# Patient Record
Sex: FEMALE | Race: WHITE | NOT HISPANIC OR LATINO | Employment: OTHER | URBAN - METROPOLITAN AREA
[De-identification: names, ages, dates, MRNs, and addresses within clinical notes are randomized per-mention and may not be internally consistent; named-entity substitution may affect disease eponyms.]

---

## 2018-06-11 ENCOUNTER — TELEPHONE (OUTPATIENT)
Dept: PODIATRY | Facility: CLINIC | Age: 83
End: 2018-06-11

## 2018-06-11 NOTE — TELEPHONE ENCOUNTER
----- Message from Ernesto Sims sent at 6/11/2018  9:16 AM CDT -----  Contact: same  Patient called in and wants to be seen before the next available of 6/18/18 to get her toenails trimmed.  Patient call back number is [phone]

## 2018-06-13 ENCOUNTER — OFFICE VISIT (OUTPATIENT)
Dept: PODIATRY | Facility: CLINIC | Age: 83
End: 2018-06-13
Payer: MEDICARE

## 2018-06-13 VITALS
DIASTOLIC BLOOD PRESSURE: 59 MMHG | WEIGHT: 175 LBS | SYSTOLIC BLOOD PRESSURE: 139 MMHG | BODY MASS INDEX: 31.01 KG/M2 | HEART RATE: 69 BPM | TEMPERATURE: 98 F | HEIGHT: 63 IN

## 2018-06-13 DIAGNOSIS — E11.49 TYPE II DIABETES MELLITUS WITH NEUROLOGICAL MANIFESTATIONS: Primary | ICD-10-CM

## 2018-06-13 DIAGNOSIS — M19.071 OSTEOARTHRITIS OF RIGHT ANKLE AND FOOT: ICD-10-CM

## 2018-06-13 DIAGNOSIS — E11.42 DIABETIC POLYNEUROPATHY ASSOCIATED WITH TYPE 2 DIABETES MELLITUS: ICD-10-CM

## 2018-06-13 PROCEDURE — 99213 OFFICE O/P EST LOW 20 MIN: CPT | Mod: PBBFAC | Performed by: PODIATRIST

## 2018-06-13 PROCEDURE — 99213 OFFICE O/P EST LOW 20 MIN: CPT | Mod: S$PBB,,, | Performed by: PODIATRIST

## 2018-06-13 PROCEDURE — 99999 PR PBB SHADOW E&M-EST. PATIENT-LVL III: CPT | Mod: PBBFAC,,, | Performed by: PODIATRIST

## 2018-06-16 NOTE — PROGRESS NOTES
Subjective:       Patient ID: Cinthya Cuevas is a 83 y.o. female.    Chief Complaint: Follow-up; Nail Problem; and Callouses  The patient returns for followup she has a history of severe pes planus contracted digits diabetes type 2 and degenerative arthritis. Patient states she has been wearing her newly dispensed orthotics which have been working great and completely relieving her foot pain she states it is also reduced the calluses that have presented on the right foot     HPI  Review of Systems   Musculoskeletal: Positive for arthralgias, gait problem and joint swelling.   Neurological: Positive for numbness.   All other systems reviewed and are negative.      Objective:      Physical Exam   Constitutional: She appears well-developed and well-nourished.   Cardiovascular:   Pulses:       Dorsalis pedis pulses are 1+ on the right side, and 1+ on the left side.        Posterior tibial pulses are 0 on the right side, and 0 on the left side.   Musculoskeletal: She exhibits edema, tenderness and deformity.        Right foot: There is deformity.        Left foot: There is deformity.   Feet:   Right Foot:   Protective Sensation: 4 sites tested. 1 site sensed.  Skin Integrity: Positive for callus and dry skin.   Left Foot:   Protective Sensation: 4 sites tested. 1 site sensed.  Skin Integrity: Positive for callus and dry skin.   Neurological: She displays abnormal reflex.   Skin: Capillary refill takes more than 3 seconds.   Psychiatric: She has a normal mood and affect. Her behavior is normal. Judgment and thought content normal.   Nursing note and vitals reviewed.    Patient has findings consistent with diabetic related neuropathy bilateral. No skin breaks or active signs of infection noted however the patient does have mild hyperkeratotic tissue with a pre-ulcerative area on the distal aspect of the second digit right foot as well as along the medial aspect of the patient's first digit right foot both of these  areas are painful upon direct palpation currently did not display signs of infection but are at risk for additional breakdown.patient is noted to have considerable hyperkeratotic tissue buildup this is on the plantar aspect of the first MPJ right this area is very sore and tender to palpation it is altered in nature debridement of the area reveals healthy pink tissue there is significant relief noted following debridement no active signs of infection currently noted. the area of most significantly noted discomfort is distal second digit right secondary to digital contracture.I cannot exclude the possibility of a bone spur on the tip of the second digit right because of the severity of discomfort although debridement of the nail seems to relieve some of the patient's pain. Patient does display severe medial column collapse and pronation on the right foot in comparison to the left which is likely contributing to stress put on the patient's right knee with associated inflammation.     Assessment:       1. Type II diabetes mellitus with neurological manifestations    2. Osteoarthritis of right ankle and foot    3. Diabetic polyneuropathy associated with type 2 diabetes mellitus        Plan:       Following evaluation patient's orthotics are noted to be fitting the patient very well their supporting the patient very well they have completely relieved the pain the patient was having in both feet additionally the areas of pre-ulcerative hyperkeratotic tissue underlying the first MPJ and medial right hallux are significantly reduced no significant pain is noted in these areas. The small amount of hyperkeratotic tissue that is present was debrided today I did examine the patient orthotics they are noted to be fitting the patient well patient had several ingrown toenails are also debrided today followup will be as needed diabetic evaluation provided.patient did indicate today that some days she is not able to wear her arch  supports 3-4 hours because they just make her feet feel tired she states they take she takes them off and a tired feeling is immediately relieved I did advise the patient as long as she is wearing them the majority of the time especially with increased activity that the most important time that these be worn. I applied a temporary lateral posting with adhesive felt to the posterior and mid foot area of the patient's orthotics I want to see how the patient responds to this if it makes her feet much more comfortable certainly these changes can be made permanently depending upon how the patient responds to that at followup.Patient had several ingrowing toenails especially noted on the distal medial left in the lateral right these are at risk for infection following debridement patient had considerable relief.   Patient was very emotional today her  passed away approximately 1 week ago.

## 2018-08-27 ENCOUNTER — OFFICE VISIT (OUTPATIENT)
Dept: PODIATRY | Facility: CLINIC | Age: 83
End: 2018-08-27
Payer: MEDICARE

## 2018-08-27 VITALS
RESPIRATION RATE: 18 BRPM | SYSTOLIC BLOOD PRESSURE: 124 MMHG | DIASTOLIC BLOOD PRESSURE: 61 MMHG | HEART RATE: 86 BPM | TEMPERATURE: 97 F

## 2018-08-27 DIAGNOSIS — E11.49 TYPE II DIABETES MELLITUS WITH NEUROLOGICAL MANIFESTATIONS: Primary | ICD-10-CM

## 2018-08-27 DIAGNOSIS — L60.0 INGROWN NAIL: ICD-10-CM

## 2018-08-27 PROCEDURE — 99213 OFFICE O/P EST LOW 20 MIN: CPT | Mod: S$PBB,,, | Performed by: PODIATRIST

## 2018-08-27 PROCEDURE — 99999 PR PBB SHADOW E&M-EST. PATIENT-LVL III: CPT | Mod: PBBFAC,,, | Performed by: PODIATRIST

## 2018-08-27 PROCEDURE — 99213 OFFICE O/P EST LOW 20 MIN: CPT | Mod: PBBFAC | Performed by: PODIATRIST

## 2018-09-01 PROBLEM — L60.0 INGROWN NAIL: Status: ACTIVE | Noted: 2018-09-01

## 2018-09-01 PROBLEM — E11.49 TYPE II DIABETES MELLITUS WITH NEUROLOGICAL MANIFESTATIONS: Status: ACTIVE | Noted: 2018-09-01

## 2018-09-01 NOTE — PROGRESS NOTES
Subjective:       Patient ID: Cinthya Cuevas is a 83 y.o. female.    Chief Complaint: Diabetic Foot Exam  The patient returns for followup she has a history of severe pes planus contracted digits diabetes type 2 and degenerative arthritis. Patient states she has been wearing her newly dispensed orthotics which have been working great and completely relieving her foot pain she states it is also reduced the calluses that have presented on the right foot     HPI  Review of Systems   Musculoskeletal: Positive for arthralgias, gait problem and joint swelling.   Neurological: Positive for numbness.   All other systems reviewed and are negative.      Objective:      Physical Exam   Constitutional: She appears well-developed and well-nourished.   Cardiovascular:   Pulses:       Dorsalis pedis pulses are 1+ on the right side, and 1+ on the left side.        Posterior tibial pulses are 0 on the right side, and 0 on the left side.   Musculoskeletal: She exhibits edema, tenderness and deformity.        Right foot: There is deformity.        Left foot: There is deformity.   Feet:   Right Foot:   Protective Sensation: 4 sites tested. 1 site sensed.  Skin Integrity: Positive for callus and dry skin.   Left Foot:   Protective Sensation: 4 sites tested. 1 site sensed.  Skin Integrity: Positive for callus and dry skin.   Neurological: She displays abnormal reflex.   Skin: Capillary refill takes more than 3 seconds.   Psychiatric: She has a normal mood and affect. Her behavior is normal. Judgment and thought content normal.   Nursing note and vitals reviewed.    Patient has findings consistent with diabetic related neuropathy bilateral. No skin breaks or active signs of infection noted however the patient does have mild hyperkeratotic tissue with a pre-ulcerative area on the distal aspect of the second digit right foot as well as along the medial aspect of the patient's first digit right foot both of these areas are painful upon  direct palpation currently did not display signs of infection but are at risk for additional breakdown.patient is noted to have considerable hyperkeratotic tissue buildup this is on the plantar aspect of the first MPJ right this area is very sore and tender to palpation it is altered in nature debridement of the area reveals healthy pink tissue there is significant relief noted following debridement no active signs of infection currently noted. the area of most significantly noted discomfort is distal second digit right secondary to digital contracture.I cannot exclude the possibility of a bone spur on the tip of the second digit right because of the severity of discomfort although debridement of the nail seems to relieve some of the patient's pain. Patient does display severe medial column collapse and pronation on the right foot in comparison to the left which is likely contributing to stress put on the patient's right knee with associated inflammation.     Assessment:       1. Type II diabetes mellitus with neurological manifestations    2. Ingrown nail        Plan:       Following evaluation patient's orthotics are noted to be fitting the patient very well their supporting the patient very well they have completely relieved the pain the patient was having in both feet additionally the areas of pre-ulcerative hyperkeratotic tissue underlying the first MPJ and medial right hallux are significantly reduced no significant pain is noted in these areas. The small amount of hyperkeratotic tissue that is present was debrided today I did examine the patient orthotics they are noted to be fitting the patient well patient had several ingrown toenails are also debrided today followup will be as needed diabetic evaluation provided.patient did indicate today that some days she is not able to wear her arch supports 3-4 hours because they just make her feet feel tired she states they take she takes them off and a tired feeling  is immediately relieved I did advise the patient as long as she is wearing them the majority of the time especially with increased activity that the most important time that these be worn. I applied a temporary lateral posting with adhesive felt to the posterior and mid foot area of the patient's orthotics I want to see how the patient responds to this if it makes her feet much more comfortable certainly these changes can be made permanently depending upon how the patient responds to that at followup.Patient had several ingrowing toenails especially noted on the distal medial left in the lateral right these are at risk for infection following debridement patient had considerable relief.   Patient was very emotional today her  passed away.

## 2018-11-05 ENCOUNTER — OFFICE VISIT (OUTPATIENT)
Dept: PODIATRY | Facility: CLINIC | Age: 83
End: 2018-11-05
Payer: MEDICARE

## 2018-11-05 VITALS — BODY MASS INDEX: 32.02 KG/M2 | WEIGHT: 174 LBS | TEMPERATURE: 97 F | HEIGHT: 62 IN

## 2018-11-05 DIAGNOSIS — L60.0 INGROWN NAIL: ICD-10-CM

## 2018-11-05 DIAGNOSIS — E11.49 TYPE II DIABETES MELLITUS WITH NEUROLOGICAL MANIFESTATIONS: Primary | ICD-10-CM

## 2018-11-05 DIAGNOSIS — L97.511 SKIN ULCER OF RIGHT FOOT, LIMITED TO BREAKDOWN OF SKIN: ICD-10-CM

## 2018-11-05 PROCEDURE — 99999 PR PBB SHADOW E&M-EST. PATIENT-LVL II: CPT | Mod: PBBFAC,,, | Performed by: PODIATRIST

## 2018-11-05 PROCEDURE — 99213 OFFICE O/P EST LOW 20 MIN: CPT | Mod: S$PBB,,, | Performed by: PODIATRIST

## 2018-11-05 PROCEDURE — 99212 OFFICE O/P EST SF 10 MIN: CPT | Mod: PBBFAC | Performed by: PODIATRIST

## 2018-11-05 RX ORDER — SIMVASTATIN 40 MG/1
TABLET, FILM COATED ORAL
COMMUNITY
Start: 2018-08-12 | End: 2021-08-17

## 2018-11-05 RX ORDER — AMITRIPTYLINE HYDROCHLORIDE 25 MG/1
TABLET, FILM COATED ORAL
COMMUNITY
Start: 2018-09-27 | End: 2021-03-23 | Stop reason: SDUPTHER

## 2018-11-10 PROBLEM — L97.511 SKIN ULCER OF RIGHT FOOT, LIMITED TO BREAKDOWN OF SKIN: Status: ACTIVE | Noted: 2018-11-10

## 2018-11-10 NOTE — PROGRESS NOTES
Subjective:       Patient ID: Cinthya Cuevas is a 83 y.o. female.    Chief Complaint: Follow-up; Nail Problem; and Callouses  The patient returns for followup she has a history of severe pes planus contracted digits diabetes type 2 and degenerative arthritis. Patient states she has been wearing her newly dispensed orthotics which have been working great and completely relieving her foot pain she states it is also reduced the calluses that have presented on the right foot     Nail Problem   Associated symptoms include arthralgias, joint swelling and numbness.     Review of Systems   Musculoskeletal: Positive for arthralgias, gait problem and joint swelling.   Neurological: Positive for numbness.   All other systems reviewed and are negative.      Objective:      Physical Exam   Constitutional: She appears well-developed and well-nourished.   Cardiovascular:   Pulses:       Dorsalis pedis pulses are 1+ on the right side, and 1+ on the left side.        Posterior tibial pulses are 0 on the right side, and 0 on the left side.   Musculoskeletal: She exhibits edema, tenderness and deformity.        Right foot: There is deformity.        Left foot: There is deformity.   Feet:   Right Foot:   Protective Sensation: 4 sites tested. 1 site sensed.  Skin Integrity: Positive for callus and dry skin.   Left Foot:   Protective Sensation: 4 sites tested. 1 site sensed.  Skin Integrity: Positive for callus and dry skin.   Neurological: She displays abnormal reflex.   Skin: Capillary refill takes more than 3 seconds.   Psychiatric: She has a normal mood and affect. Her behavior is normal. Judgment and thought content normal.   Nursing note and vitals reviewed.    Patient has findings consistent with diabetic related neuropathy bilateral. No skin breaks or active signs of infection noted however the patient does have mild hyperkeratotic tissue with a pre-ulcerative area on the distal aspect of the second digit right foot as well  as along the medial aspect of the patient's first digit right foot both of these areas are painful upon direct palpation currently did not display signs of infection but are at risk for additional breakdown.patient is noted to have considerable hyperkeratotic tissue buildup this is on the plantar aspect of the first MPJ right this area is very sore and tender to palpation it is altered in nature debridement of the area reveals healthy pink tissue there is significant relief noted following debridement no active signs of infection currently noted. the area of most significantly noted discomfort is distal second digit right secondary to digital contracture.I cannot exclude the possibility of a bone spur on the tip of the second digit right because of the severity of discomfort although debridement of the nail seems to relieve some of the patient's pain. Patient does display severe medial column collapse and pronation on the right foot in comparison to the left which is likely contributing to stress put on the patient's right knee with associated inflammation.     Assessment:       1. Type II diabetes mellitus with neurological manifestations    2. Ingrown nail    3. Skin ulcer of right foot, limited to breakdown of skin        Plan:       Following evaluation patient's orthotics are noted to be fitting the patient very well their supporting the patient very well they have completely relieved the pain the patient was having in both feet additionally the areas of pre-ulcerative hyperkeratotic tissue underlying the first MPJ and medial right hallux are significantly reduced no significant pain is noted in these areas. The small amount of hyperkeratotic tissue that is present was debrided today I did examine the patient orthotics they are noted to be fitting the patient well patient had several ingrown toenails are also debrided today followup will be as needed diabetic evaluation provided.patient did indicate today that  some days she is not able to wear her arch supports 3-4 hours because they just make her feet feel tired she states they take she takes them off and a tired feeling is immediately relieved I did advise the patient as long as she is wearing them the majority of the time especially with increased activity that the most important time that these be worn.Patient had several ingrowing toenails especially noted on the distal medial left in the lateral right these are at risk for infection following debridement patient had considerable relief.    Patient relates she fell earlier today and was not able to get up she had the fire department help her get up and regain her balance she had multiple bruises on her lower extremities an arm.

## 2019-02-18 ENCOUNTER — OFFICE VISIT (OUTPATIENT)
Dept: PODIATRY | Facility: CLINIC | Age: 84
End: 2019-02-18
Payer: MEDICARE

## 2019-02-18 VITALS
SYSTOLIC BLOOD PRESSURE: 172 MMHG | WEIGHT: 174 LBS | BODY MASS INDEX: 32.02 KG/M2 | DIASTOLIC BLOOD PRESSURE: 62 MMHG | HEIGHT: 62 IN | HEART RATE: 64 BPM

## 2019-02-18 DIAGNOSIS — E11.49 TYPE II DIABETES MELLITUS WITH NEUROLOGICAL MANIFESTATIONS: ICD-10-CM

## 2019-02-18 DIAGNOSIS — L97.511 SKIN ULCER OF RIGHT FOOT, LIMITED TO BREAKDOWN OF SKIN: Primary | ICD-10-CM

## 2019-02-18 DIAGNOSIS — L60.0 INGROWN NAIL: ICD-10-CM

## 2019-02-18 PROCEDURE — 99213 OFFICE O/P EST LOW 20 MIN: CPT | Mod: S$PBB,,, | Performed by: PODIATRIST

## 2019-02-18 PROCEDURE — 99999 PR PBB SHADOW E&M-EST. PATIENT-LVL III: ICD-10-PCS | Mod: PBBFAC,,, | Performed by: PODIATRIST

## 2019-02-18 PROCEDURE — 99999 PR PBB SHADOW E&M-EST. PATIENT-LVL III: CPT | Mod: PBBFAC,,, | Performed by: PODIATRIST

## 2019-02-18 PROCEDURE — 99213 OFFICE O/P EST LOW 20 MIN: CPT | Mod: PBBFAC | Performed by: PODIATRIST

## 2019-02-18 PROCEDURE — 99213 PR OFFICE/OUTPT VISIT, EST, LEVL III, 20-29 MIN: ICD-10-PCS | Mod: S$PBB,,, | Performed by: PODIATRIST

## 2019-02-18 RX ORDER — METFORMIN HYDROCHLORIDE 1000 MG/1
TABLET ORAL
COMMUNITY
Start: 2019-02-15 | End: 2020-07-15

## 2019-02-23 NOTE — PROGRESS NOTES
Subjective:       Patient ID: Cinthya Cuevas is a 84 y.o. female.    Chief Complaint: Follow-up; Diabetes Mellitus; Nail Problem; and Foot Problem  The patient returns for followup she has a history of severe pes planus contracted digits diabetes type 2 and degenerative arthritis.   Nail Problem   Associated symptoms include arthralgias, joint swelling and numbness.     Review of Systems   Musculoskeletal: Positive for arthralgias, gait problem and joint swelling.   Neurological: Positive for numbness.   All other systems reviewed and are negative.      Objective:      Physical Exam   Constitutional: She appears well-developed and well-nourished.   Cardiovascular:   Pulses:       Dorsalis pedis pulses are 1+ on the right side, and 1+ on the left side.        Posterior tibial pulses are 0 on the right side, and 0 on the left side.   Musculoskeletal: She exhibits edema, tenderness and deformity.        Right foot: There is deformity.        Left foot: There is deformity.   Feet:   Right Foot:   Protective Sensation: 4 sites tested. 1 site sensed.  Skin Integrity: Positive for callus and dry skin.   Left Foot:   Protective Sensation: 4 sites tested. 1 site sensed.  Skin Integrity: Positive for callus and dry skin.   Neurological: She displays abnormal reflex.   Skin: Capillary refill takes more than 3 seconds.   Psychiatric: She has a normal mood and affect. Her behavior is normal. Judgment and thought content normal.   Nursing note and vitals reviewed.    Patient has findings consistent with diabetic related neuropathy bilateral. No skin breaks or active signs of infection noted however the patient does have mild hyperkeratotic tissue with a pre-ulcerative area on the distal aspect of the second digit right foot as well as along the medial aspect of the patient's first digit right foot both of these areas are painful upon direct palpation currently did not display signs of infection but are at risk for additional  breakdown.patient is noted to have considerable hyperkeratotic tissue buildup this is on the plantar aspect of the first MPJ right this area is very sore and tender to palpation it is altered in nature debridement of the area reveals healthy pink tissue there is significant relief noted following debridement no active signs of infection currently noted. the area of most significantly noted discomfort is distal second digit right secondary to digital contracture.I cannot exclude the possibility of a bone spur on the tip of the second digit right because of the severity of discomfort although debridement of the nail seems to relieve some of the patient's pain. Patient does display severe medial column collapse and pronation on the right foot in comparison to the left which is likely contributing to stress put on the patient's right knee with associated inflammation.     Assessment:       1. Skin ulcer of right foot, limited to breakdown of skin    2. Ingrown nail    3. Type II diabetes mellitus with neurological manifestations        Plan:       Following evaluation patient's orthotics are noted to be fitting the patient very well their supporting the patient very well they have completely relieved the pain the patient was having in both feet additionally the areas of pre-ulcerative hyperkeratotic tissue underlying the first MPJ and medial right hallux are significantly reduced no significant pain is noted in these areas. The small amount of hyperkeratotic tissue that is present was debrided today I did examine the patient orthotics they are noted to be fitting the patient well patient had several ingrown toenails are also debrided today followup will be as needed diabetic evaluation provided.patient did indicate today that some days she is not able to wear her arch supports 3-4 hours because they just make her feet feel tired she states they take she takes them off and a tired feeling is immediately relieved I did  advise the patient as long as she is wearing them the majority of the time especially with increased activity that the most important time that these be worn.Patient had several ingrowing toenails especially noted on the distal medial left in the lateral right these are at risk for infection following debridement patient had considerable relief.       This note was created using [employer] voice recognition software that occasionally misinterpreted phrases or words.

## 2019-03-06 ENCOUNTER — OFFICE VISIT (OUTPATIENT)
Dept: PODIATRY | Facility: CLINIC | Age: 84
End: 2019-03-06
Payer: MEDICARE

## 2019-03-06 ENCOUNTER — TELEPHONE (OUTPATIENT)
Dept: PODIATRY | Facility: CLINIC | Age: 84
End: 2019-03-06

## 2019-03-06 ENCOUNTER — LAB VISIT (OUTPATIENT)
Dept: LAB | Facility: HOSPITAL | Age: 84
End: 2019-03-06
Attending: PODIATRIST
Payer: MEDICARE

## 2019-03-06 VITALS
DIASTOLIC BLOOD PRESSURE: 69 MMHG | SYSTOLIC BLOOD PRESSURE: 141 MMHG | TEMPERATURE: 98 F | HEIGHT: 62 IN | BODY MASS INDEX: 32.02 KG/M2 | HEART RATE: 61 BPM | WEIGHT: 174 LBS

## 2019-03-06 DIAGNOSIS — M10.9 GOUT OF RIGHT FOOT, UNSPECIFIED CAUSE, UNSPECIFIED CHRONICITY: Primary | ICD-10-CM

## 2019-03-06 DIAGNOSIS — M10.9 GOUT OF RIGHT FOOT, UNSPECIFIED CAUSE, UNSPECIFIED CHRONICITY: ICD-10-CM

## 2019-03-06 DIAGNOSIS — L03.119 CELLULITIS AND ABSCESS OF FOOT: ICD-10-CM

## 2019-03-06 DIAGNOSIS — L02.619 CELLULITIS AND ABSCESS OF FOOT: ICD-10-CM

## 2019-03-06 LAB
BASOPHILS # BLD AUTO: 0.03 K/UL
BASOPHILS NFR BLD: 0.4 %
DIFFERENTIAL METHOD: ABNORMAL
EOSINOPHIL # BLD AUTO: 0.1 K/UL
EOSINOPHIL NFR BLD: 1.6 %
ERYTHROCYTE [DISTWIDTH] IN BLOOD BY AUTOMATED COUNT: 14.3 %
HCT VFR BLD AUTO: 37.5 %
HGB BLD-MCNC: 11.8 G/DL
IMM GRANULOCYTES # BLD AUTO: 0.08 K/UL
IMM GRANULOCYTES NFR BLD AUTO: 1 %
LYMPHOCYTES # BLD AUTO: 1.4 K/UL
LYMPHOCYTES NFR BLD: 17.9 %
MCH RBC QN AUTO: 28.2 PG
MCHC RBC AUTO-ENTMCNC: 31.5 G/DL
MCV RBC AUTO: 90 FL
MONOCYTES # BLD AUTO: 0.5 K/UL
MONOCYTES NFR BLD: 6.7 %
NEUTROPHILS # BLD AUTO: 5.7 K/UL
NEUTROPHILS NFR BLD: 72.4 %
NRBC BLD-RTO: 0 /100 WBC
PLATELET # BLD AUTO: 176 K/UL
PMV BLD AUTO: 10 FL
RBC # BLD AUTO: 4.18 M/UL
URATE SERPL-MCNC: 6.8 MG/DL
WBC # BLD AUTO: 7.88 K/UL

## 2019-03-06 PROCEDURE — 99213 OFFICE O/P EST LOW 20 MIN: CPT | Mod: PBBFAC | Performed by: PODIATRIST

## 2019-03-06 PROCEDURE — 84550 ASSAY OF BLOOD/URIC ACID: CPT

## 2019-03-06 PROCEDURE — 99214 PR OFFICE/OUTPT VISIT, EST, LEVL IV, 30-39 MIN: ICD-10-PCS | Mod: S$PBB,,, | Performed by: PODIATRIST

## 2019-03-06 PROCEDURE — 85025 COMPLETE CBC W/AUTO DIFF WBC: CPT

## 2019-03-06 PROCEDURE — 99999 PR PBB SHADOW E&M-EST. PATIENT-LVL III: ICD-10-PCS | Mod: PBBFAC,,, | Performed by: PODIATRIST

## 2019-03-06 PROCEDURE — 96372 THER/PROPH/DIAG INJ SC/IM: CPT | Mod: PBBFAC,RT | Performed by: PODIATRIST

## 2019-03-06 PROCEDURE — 99999 PR PBB SHADOW E&M-EST. PATIENT-LVL III: CPT | Mod: PBBFAC,,, | Performed by: PODIATRIST

## 2019-03-06 PROCEDURE — 99214 OFFICE O/P EST MOD 30 MIN: CPT | Mod: S$PBB,,, | Performed by: PODIATRIST

## 2019-03-06 PROCEDURE — 36415 COLL VENOUS BLD VENIPUNCTURE: CPT

## 2019-03-06 RX ORDER — COLCHICINE 0.6 MG/1
0.6 TABLET ORAL DAILY
Qty: 10 TABLET | Refills: 1 | Status: SHIPPED | OUTPATIENT
Start: 2019-03-06 | End: 2019-03-18

## 2019-03-06 RX ORDER — INDOMETHACIN 50 MG/1
50 CAPSULE ORAL 2 TIMES DAILY WITH MEALS
Qty: 42 CAPSULE | Refills: 2 | Status: SHIPPED | OUTPATIENT
Start: 2019-03-06 | End: 2019-03-18 | Stop reason: SDUPTHER

## 2019-03-06 RX ORDER — BETAMETHASONE SODIUM PHOSPHATE AND BETAMETHASONE ACETATE 3; 3 MG/ML; MG/ML
12 INJECTION, SUSPENSION INTRA-ARTICULAR; INTRALESIONAL; INTRAMUSCULAR; SOFT TISSUE
Status: COMPLETED | OUTPATIENT
Start: 2019-03-06 | End: 2019-03-06

## 2019-03-06 RX ADMIN — BETAMETHASONE ACETATE AND BETAMETHASONE SODIUM PHOSPHATE 12 MG: 3; 3 INJECTION, SUSPENSION INTRA-ARTICULAR; INTRALESIONAL; INTRAMUSCULAR; SOFT TISSUE at 03:03

## 2019-03-06 NOTE — TELEPHONE ENCOUNTER
Pt states she has been having a gout flare up for two days. Pt is requesting a rx to be sent to pharmacy. Please advise

## 2019-03-06 NOTE — TELEPHONE ENCOUNTER
----- Message from Kathleen Dejesus sent at 3/6/2019  8:35 AM CST -----  Contact: Patient  Type: Needs Medical Advice    Who Called:  Patient  Symptoms (please be specific):  Really bad gout  How long has patient had these symptoms:  2 days  Pharmacy name and phone #:    [id number] - [place], [place] - [address]  [address]  [place] [address]  Phone: [phone] Fax: [phone]  Best Call Back Number: [phone]  Additional Information: Patient would like to speak with someone at the office

## 2019-03-06 NOTE — TELEPHONE ENCOUNTER
Pt states she has not had a gout flare up. She thinks this is what it is since she is having the same s/s that her  did when he would have a flare up. Does she need to come in?

## 2019-03-06 NOTE — TELEPHONE ENCOUNTER
----- Message from Theo Urias DPM sent at 3/6/2019  4:15 PM CST -----  Please call the patient regarding her abnormal result.Advise Uric acid is elevated 5.7 is high she is 6.8

## 2019-03-10 NOTE — PROGRESS NOTES
Subjective:       Patient ID: Cinthya Cuevas is a 84 y.o. female.    Chief Complaint: Follow-up; Foot Pain; Foot Problem; and Diabetes Mellitus   Patient presents today stating that she is having a gout attack in her right foot she relates she has never had gout before but her late  had gout and she knows what it looks like.  Patient relates no injury or trauma to her right foot.    Nail Problem   Associated symptoms include arthralgias, joint swelling and numbness.   Foot Pain   Associated symptoms include arthralgias, joint swelling and numbness.     Review of Systems   Musculoskeletal: Positive for arthralgias, gait problem and joint swelling.   Neurological: Positive for numbness.   All other systems reviewed and are negative.      Objective:      Physical Exam   Constitutional: She appears well-developed and well-nourished.   Cardiovascular:   Pulses:       Dorsalis pedis pulses are 1+ on the right side, and 1+ on the left side.        Posterior tibial pulses are 0 on the right side, and 0 on the left side.   Musculoskeletal: She exhibits edema, tenderness and deformity.        Right foot: There is deformity.        Left foot: There is deformity.   Feet:   Right Foot:   Protective Sensation: 4 sites tested. 1 site sensed.  Skin Integrity: Positive for callus and dry skin.   Left Foot:   Protective Sensation: 4 sites tested. 1 site sensed.  Skin Integrity: Positive for callus and dry skin.   Neurological: She displays abnormal reflex.   Skin: Capillary refill takes more than 3 seconds.   Psychiatric: She has a normal mood and affect. Her behavior is normal. Judgment and thought content normal.   Nursing note and vitals reviewed.            Uric acid   Order: [id number]   Status:  Final result   Visible to patient:  No (Not Released) Next appt:  03/18/2019 at 11:45 AM in Podiatry (Theo Urias DPM) Dx:  Cellulitis and abscess of foot - Righ...    Ref Range & Units 3d ago   Uric Acid 2.4 - 5.7  mg/dL 6.8 Abnormally high     Resulting Agency  [facility]         Specimen Collected: 03/06/19 15:45 Last Resulted: 03/06/19 16:07 Lab Flowsheet Order Details View Encounter Lab and Collection Details Routing Result History               Assessment:       1. Gout of right foot, unspecified cause, unspecified chronicity    2. Cellulitis and abscess of foot - Right Foot        Plan:       Patient presents today for an unscheduled visit she contacted our office stating that she was having a gout attack in her right foot patient is a diabetic she has no history of ever having had gout but her late  had frequent gout attack so she states she knows what it looks like and she knows the amount of pain that he was in she states that this started yesterday morning it has gotten progressively worse the patient was not even able to leave the house yesterday the pain was so severe patient states she was not able to put a shoe on yesterday because of the swelling. Patient has considerable cellulitis and in skin crease skin temperature overlying the dorsal aspect of the right foot most specifically the 1st MPJ right there are no skin breaks no signs of drainage noted.  Subsequent uric acid is significantly elevated at 6.8.  Patient has Lasix listed on her medication however she states she has not taken Lasix and a long period of time she cannot relate any injury or trauma to her right foot and no significant change in activity or diet.  Patient was administered a Celestone injection today IM right side she tolerated this well I have started the patient on indomethacin and colchicine I plan to see the patient for follow-up in 10 days certainly if this becomes worse or more painful patient is to contact us immediately I have recommended ice and elevation the patient needs to stay off of her foot as much as possible.  Total face-to-face time including discussion evaluation treatment and discussion of gout and providing  education regarding gout equaled 30 min.      This note was created using [employer] voice recognition software that occasionally misinterpreted phrases or words.

## 2019-03-18 ENCOUNTER — OFFICE VISIT (OUTPATIENT)
Dept: PODIATRY | Facility: CLINIC | Age: 84
End: 2019-03-18
Payer: MEDICARE

## 2019-03-18 VITALS
TEMPERATURE: 98 F | BODY MASS INDEX: 32.02 KG/M2 | WEIGHT: 174 LBS | SYSTOLIC BLOOD PRESSURE: 123 MMHG | DIASTOLIC BLOOD PRESSURE: 59 MMHG | HEIGHT: 62 IN | HEART RATE: 77 BPM

## 2019-03-18 DIAGNOSIS — E11.49 TYPE II DIABETES MELLITUS WITH NEUROLOGICAL MANIFESTATIONS: ICD-10-CM

## 2019-03-18 DIAGNOSIS — M10.9 GOUT OF RIGHT FOOT, UNSPECIFIED CAUSE, UNSPECIFIED CHRONICITY: Primary | ICD-10-CM

## 2019-03-18 PROCEDURE — 99213 OFFICE O/P EST LOW 20 MIN: CPT | Mod: PBBFAC | Performed by: PODIATRIST

## 2019-03-18 PROCEDURE — 99999 PR PBB SHADOW E&M-EST. PATIENT-LVL III: ICD-10-PCS | Mod: PBBFAC,,, | Performed by: PODIATRIST

## 2019-03-18 PROCEDURE — 99213 PR OFFICE/OUTPT VISIT, EST, LEVL III, 20-29 MIN: ICD-10-PCS | Mod: S$PBB,,, | Performed by: PODIATRIST

## 2019-03-18 PROCEDURE — 99213 OFFICE O/P EST LOW 20 MIN: CPT | Mod: S$PBB,,, | Performed by: PODIATRIST

## 2019-03-18 PROCEDURE — 99999 PR PBB SHADOW E&M-EST. PATIENT-LVL III: CPT | Mod: PBBFAC,,, | Performed by: PODIATRIST

## 2019-03-18 RX ORDER — INDOMETHACIN 50 MG/1
50 CAPSULE ORAL 2 TIMES DAILY WITH MEALS
Qty: 42 CAPSULE | Refills: 2 | Status: SHIPPED | OUTPATIENT
Start: 2019-03-18 | End: 2019-04-01

## 2019-03-18 RX ORDER — COLCHICINE 0.6 MG/1
0.6 TABLET ORAL DAILY
Qty: 10 TABLET | Refills: 1 | Status: SHIPPED | OUTPATIENT
Start: 2019-03-18 | End: 2021-08-17

## 2019-03-23 NOTE — PROGRESS NOTES
Subjective:       Patient ID: Cinthya Cuevas is a 84 y.o. female.    Chief Complaint: Follow-up; Foot Pain; Foot Problem; and Diabetes Mellitus   patient presents for follow-up of gout attack right foot. She states she is doing dramatically better however she is now concerned about her left foot where she dropped something hitting the top of her foot causing a lot of bruising bruising.  Patient has finished taking her indomethacin and her colchicine.    Foot Pain   Associated symptoms include arthralgias, joint swelling and numbness.   Nail Problem   Associated symptoms include arthralgias, joint swelling and numbness.     Review of Systems   Musculoskeletal: Positive for arthralgias, gait problem and joint swelling.   Neurological: Positive for numbness.   All other systems reviewed and are negative.      Objective:      Physical Exam   Constitutional: She appears well-developed and well-nourished.   Cardiovascular:   Pulses:       Dorsalis pedis pulses are 1+ on the right side, and 1+ on the left side.        Posterior tibial pulses are 0 on the right side, and 0 on the left side.   Musculoskeletal: She exhibits edema, tenderness and deformity.        Right foot: There is deformity.        Left foot: There is deformity.   Feet:   Right Foot:   Protective Sensation: 4 sites tested. 1 site sensed.  Skin Integrity: Positive for callus and dry skin.   Left Foot:   Protective Sensation: 4 sites tested. 1 site sensed.  Skin Integrity: Positive for callus and dry skin.   Neurological: She displays abnormal reflex.   Skin: Capillary refill takes more than 3 seconds.   Psychiatric: She has a normal mood and affect. Her behavior is normal. Judgment and thought content normal.   Nursing note and vitals reviewed.            Uric acid   Order: [id number]   Status:  Final result   Visible to patient:  No (Not Released) Next appt:  03/18/2019 at 11:45 AM in Podiatry (Theo Urais DPM) Dx:  Cellulitis and abscess of foot  - Erikah...    Ref Range & Units 3d ago   Uric Acid 2.4 - 5.7 mg/dL 6.8 Abnormally high     Resulting Agency  [facility]         Specimen Collected: 03/06/19 15:45 Last Resulted: 03/06/19 16:07 Lab Flowsheet Order Details View Encounter Lab and Collection Details Routing Result History               Assessment:       1. Gout of right foot, unspecified cause, unspecified chronicity    2. Type II diabetes mellitus with neurological manifestations        Plan:       Following evaluation patient has significant reduction in previously noted increased skin temperature edema and erythema of the patient's right foot she has no tenderness or discomfort upon palpation examination of the patient's right foot where she had a previous gout attack patient's uric acid was 6.8.  Patient has taken both the indomethacin and colchicine as directed I have advised her I am going to give her some to keep on a shelf in her bathroom or medicine cabinet just in case she should have another attack.  Patient was in agreement with this at this point I am not going to put the patient on allopurinol this is the 1st attack she has ever had she may never have another attack I have recommended that we monitor the situation but certainly should the she thinks she start to have a gout attack again that she needs to contact me immediately.  Patient stated that within a couple hours of being seen in the office starting the medicine and having the steroid shot she was already doing better.  Follow-up will be as needed.  Total face-to-face time equaled 15 min.    This note was created using [employer] voice recognition software that occasionally misinterpreted phrases or words.

## 2019-06-17 ENCOUNTER — OFFICE VISIT (OUTPATIENT)
Dept: PODIATRY | Facility: CLINIC | Age: 84
End: 2019-06-17
Payer: MEDICARE

## 2019-06-17 ENCOUNTER — HOSPITAL ENCOUNTER (OUTPATIENT)
Dept: RADIOLOGY | Facility: HOSPITAL | Age: 84
Discharge: HOME | End: 2019-06-17
Attending: PODIATRIST
Payer: MEDICARE

## 2019-06-17 VITALS — BODY MASS INDEX: 32.02 KG/M2 | HEIGHT: 62 IN | HEART RATE: 77 BPM | WEIGHT: 174 LBS | TEMPERATURE: 99 F
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
## 2019-06-17 DIAGNOSIS — E11.49 TYPE II DIABETES MELLITUS WITH NEUROLOGICAL MANIFESTATIONS: Primary | ICD-10-CM

## 2019-06-17 DIAGNOSIS — S99.921A FOOT INJURY, RIGHT, INITIAL ENCOUNTER: ICD-10-CM

## 2019-06-17 DIAGNOSIS — L97.511 SKIN ULCER OF RIGHT FOOT, LIMITED TO BREAKDOWN OF SKIN: ICD-10-CM

## 2019-06-17 DIAGNOSIS — L60.0 INGROWN NAIL: ICD-10-CM

## 2019-06-17 PROCEDURE — 99214 PR OFFICE/OUTPT VISIT, EST, LEVL IV, 30-39 MIN: ICD-10-PCS | Mod: S$PBB,,, | Performed by: PODIATRIST

## 2019-06-17 PROCEDURE — 99999 PR PBB SHADOW E&M-EST. PATIENT-LVL III: ICD-10-PCS | Mod: PBBFAC,,, | Performed by: PODIATRIST

## 2019-06-17 PROCEDURE — 99214 OFFICE O/P EST MOD 30 MIN: CPT | Mod: S$PBB,,, | Performed by: PODIATRIST

## 2019-06-17 PROCEDURE — 73630 X-RAY EXAM OF FOOT: CPT | Mod: 26,RT,, | Performed by: RADIOLOGY

## 2019-06-17 PROCEDURE — 99999 PR PBB SHADOW E&M-EST. PATIENT-LVL III: CPT | Mod: PBBFAC,,, | Performed by: PODIATRIST

## 2019-06-17 PROCEDURE — 73630 X-RAY EXAM OF FOOT: CPT | Mod: TC,FY,RT

## 2019-06-17 PROCEDURE — 99213 OFFICE O/P EST LOW 20 MIN: CPT | Mod: PBBFAC,25 | Performed by: PODIATRIST

## 2019-06-17 PROCEDURE — 73630 XR FOOT COMPLETE 3 VIEW RIGHT: ICD-10-PCS | Mod: 26,RT,, | Performed by: RADIOLOGY

## 2019-06-17 RX ORDER — TRAZODONE HYDROCHLORIDE 50 MG/1
TABLET ORAL
COMMUNITY
Start: 2019-05-10 | End: 2021-08-17

## 2019-06-17 RX ORDER — ATORVASTATIN CALCIUM 40 MG/1
20 TABLET, FILM COATED ORAL NIGHTLY
COMMUNITY
Start: 2019-05-10 | End: 2021-08-17

## 2019-06-17 RX ORDER — POTASSIUM CHLORIDE 750 MG/1
TABLET, FILM COATED, EXTENDED RELEASE ORAL
COMMUNITY
Start: 2019-05-10 | End: 2021-07-12 | Stop reason: SDUPTHER
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
## 2019-06-22 NOTE — PROGRESS NOTES
Subjective:       Patient ID: Cinthya Cuevas is a 84 y.o. female.    Chief Complaint: Foot Injury; Follow-up; Nail Problem; and Diabetes Mellitus   patient presents today for diabetic evaluation she also recently states that she fell out of her bed 1 morning about a week ago and today's the 1st day she has been able to put her shoe on her right foot.    Foot Pain   Associated symptoms include arthralgias, joint swelling and numbness.   Nail Problem   Associated symptoms include arthralgias, joint swelling and numbness.   Foot Injury    Associated symptoms include numbness.     Review of Systems   Musculoskeletal: Positive for arthralgias, gait problem and joint swelling.   Neurological: Positive for numbness.   All other systems reviewed and are negative.      Objective:      Physical Exam   Constitutional: She appears well-developed and well-nourished.   Cardiovascular:   Pulses:       Dorsalis pedis pulses are 1+ on the right side, and 1+ on the left side.        Posterior tibial pulses are 0 on the right side, and 0 on the left side.   Musculoskeletal: She exhibits edema, tenderness and deformity.        Right foot: There is deformity.        Left foot: There is deformity.   Feet:   Right Foot:   Protective Sensation: 4 sites tested. 1 site sensed.  Skin Integrity: Positive for callus and dry skin.   Left Foot:   Protective Sensation: 4 sites tested. 1 site sensed.  Skin Integrity: Positive for callus and dry skin.   Neurological: She displays abnormal reflex.   Skin: Capillary refill takes more than 3 seconds.   Psychiatric: She has a normal mood and affect. Her behavior is normal. Judgment and thought content normal.   Nursing note and vitals reviewed.                    Assessment:       1. Type II diabetes mellitus with neurological manifestations    2. Skin ulcer of right foot, limited to breakdown of skin    3. Ingrown nail    4. Foot injury, right, initial encounter    5. Fall against object         Plan:       Patient presents today for complete diabetic evaluation in addition to this patient relates that about a week ago she fell out of her bed early in the morning she states she thinks she fell back asleep and just went had 1st out of the bed however she has significant bruising especially overlying her right elbow her foot right side displays significant ecchymosis edema it is very tender I have advised the patient I am concerned about the possibility of fracture and did do an x-ray today subsequent x-rays revealed a fracture at the base of the proximal phalanx right hallux as well as a fracture of the 2nd digit both of these areas are non or minimally displaced and should heal without complication patient needs to make sure she is wearing a loose-fitting comfortable shoe at all times obviously I have recommended ice and elevation if this should get worse in any way she is going to contact me immediately for further evaluation and treatment I discussed putting her in a postop shoe however I am concerned that that is going to affect her balance which could lead to an additional fall she is using a walker today.  Complete diabetic evaluation performed several areas of pre ulcerative hyperkeratotic lesions were debrided today x-rays reviewed I have advised the patient if this foot is not doing better over the next several weeks to contact me for follow-up further evaluation treatment I do not feel a postoperative shoe or a fracture boot would be appropriate for this patient with her problems with balance.  Total face-to-face time including discussion evaluation diabetic evaluation and treatment as well as follow-up for an injury of the right foot equaled 25 min.    This note was created using [employer] voice recognition software that occasionally misinterpreted phrases or words.

## 2019-09-25 ENCOUNTER — OFFICE VISIT (OUTPATIENT)
Dept: PODIATRY | Facility: CLINIC | Age: 84
End: 2019-09-25
Payer: MEDICARE

## 2019-09-25 VITALS
WEIGHT: 167 LBS | HEART RATE: 59 BPM | SYSTOLIC BLOOD PRESSURE: 123 MMHG | BODY MASS INDEX: 30.54 KG/M2 | DIASTOLIC BLOOD PRESSURE: 59 MMHG
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
## 2019-09-25 DIAGNOSIS — L97.511 SKIN ULCER OF RIGHT FOOT, LIMITED TO BREAKDOWN OF SKIN: ICD-10-CM

## 2019-09-25 DIAGNOSIS — L60.0 INGROWN NAIL: ICD-10-CM

## 2019-09-25 DIAGNOSIS — E11.49 TYPE II DIABETES MELLITUS WITH NEUROLOGICAL MANIFESTATIONS: Primary | ICD-10-CM

## 2019-09-25 PROCEDURE — 99999 PR PBB SHADOW E&M-EST. PATIENT-LVL II: CPT | Mod: PBBFAC,,, | Performed by: PODIATRIST

## 2019-09-25 PROCEDURE — 99999 PR PBB SHADOW E&M-EST. PATIENT-LVL II: ICD-10-PCS | Mod: PBBFAC,,, | Performed by: PODIATRIST

## 2019-09-25 PROCEDURE — 99213 PR OFFICE/OUTPT VISIT, EST, LEVL III, 20-29 MIN: ICD-10-PCS | Mod: S$PBB,,, | Performed by: PODIATRIST

## 2019-09-25 PROCEDURE — 99213 OFFICE O/P EST LOW 20 MIN: CPT | Mod: S$PBB,,, | Performed by: PODIATRIST

## 2019-09-25 PROCEDURE — 99212 OFFICE O/P EST SF 10 MIN: CPT | Mod: PBBFAC | Performed by: PODIATRIST

## 2019-09-29 NOTE — PROGRESS NOTES
Subjective:       Patient ID: Cinthya Cuevas is a 84 y.o. female.    Chief Complaint: Follow-up; Diabetes Mellitus; and Nail Problem   patient presents today for diabetic evaluation she also recently states that she fell out of her bed 1 morning about a week ago and today's the 1st day she has been able to put her shoe on her right foot.    Foot Injury    Associated symptoms include numbness.   Nail Problem   Associated symptoms include arthralgias, joint swelling and numbness.   Foot Pain   Associated symptoms include arthralgias, joint swelling and numbness.   Follow-up   Associated symptoms include arthralgias, joint swelling and numbness.     Review of Systems   Musculoskeletal: Positive for arthralgias, gait problem and joint swelling.   Neurological: Positive for numbness.   All other systems reviewed and are negative.      Objective:      Physical Exam   Constitutional: She appears well-developed and well-nourished.   Cardiovascular:   Pulses:       Dorsalis pedis pulses are 1+ on the right side, and 1+ on the left side.        Posterior tibial pulses are 0 on the right side, and 0 on the left side.   Musculoskeletal: She exhibits edema, tenderness and deformity.        Right foot: There is deformity.        Left foot: There is deformity.   Feet:   Right Foot:   Protective Sensation: 4 sites tested. 1 site sensed.  Skin Integrity: Positive for callus and dry skin.   Left Foot:   Protective Sensation: 4 sites tested. 1 site sensed.  Skin Integrity: Positive for callus and dry skin.   Neurological: She displays abnormal reflex.   Skin: Capillary refill takes more than 3 seconds.   Psychiatric: She has a normal mood and affect. Her behavior is normal. Judgment and thought content normal.   Nursing note and vitals reviewed.                    Assessment:       1. Type II diabetes mellitus with neurological manifestations    2. Ingrown nail    3. Skin ulcer of right foot, limited to breakdown of skin    4.  Fall against object        Plan:       Patient presents today for follow-up diabetic evaluation complete.  Patient has chronically ingrown toenails on both feet she has had to have these treated in trimmed for years she also has pre ulcerative hyperkeratotic lesions primarily at the distal aspect of the 2nd digit right secondary to contracture and sub 1st MPJ right these get a crease equally painful uncomfortable these have been infected before.  Patient continues to have severe balance problem she states she is awaiting the results of an MRI on her shoulder and she has been referred to physical therapy to help with strengthening.  Patient is currently using a walker all the time she states she still having problems with falling even while using a walker.  Following a complete diabetic evaluation the hyperkeratotic lesions were non excisionally debrided the ingrown toenails were debrided I plan to follow up with the patient in 6 weeks.  Patient was advised to contact us with any problems questions or concerns prior to her scheduled appointment.    This note was created using [employer] voice recognition software that occasionally misinterpreted phrases or words.

## 2019-11-27 ENCOUNTER — OFFICE VISIT (OUTPATIENT)
Dept: PODIATRY | Facility: CLINIC | Age: 84
End: 2019-11-27
Payer: MEDICARE

## 2019-11-27 VITALS
WEIGHT: 167 LBS | BODY MASS INDEX: 30.73 KG/M2 | TEMPERATURE: 98 F | RESPIRATION RATE: 19 BRPM | HEIGHT: 62 IN | OXYGEN SATURATION: 98 % | DIASTOLIC BLOOD PRESSURE: 71 MMHG | SYSTOLIC BLOOD PRESSURE: 127 MMHG | HEART RATE: 94 BPM

## 2019-11-27 DIAGNOSIS — L97.511 SKIN ULCER OF RIGHT FOOT, LIMITED TO BREAKDOWN OF SKIN: ICD-10-CM

## 2019-11-27 DIAGNOSIS — R26.81 UNSTEADY GAIT: ICD-10-CM

## 2019-11-27 DIAGNOSIS — E11.49 TYPE II DIABETES MELLITUS WITH NEUROLOGICAL MANIFESTATIONS: Primary | ICD-10-CM

## 2019-11-27 PROCEDURE — 99999 PR PBB SHADOW E&M-EST. PATIENT-LVL III: ICD-10-PCS | Mod: PBBFAC,,, | Performed by: PODIATRIST

## 2019-11-27 PROCEDURE — 99213 PR OFFICE/OUTPT VISIT, EST, LEVL III, 20-29 MIN: ICD-10-PCS | Mod: S$PBB,,, | Performed by: PODIATRIST

## 2019-11-27 PROCEDURE — 1126F PR PAIN SEVERITY QUANTIFIED, NO PAIN PRESENT: ICD-10-PCS | Mod: ,,, | Performed by: PODIATRIST

## 2019-11-27 PROCEDURE — 99999 PR PBB SHADOW E&M-EST. PATIENT-LVL III: CPT | Mod: PBBFAC,,, | Performed by: PODIATRIST

## 2019-11-27 PROCEDURE — 99213 OFFICE O/P EST LOW 20 MIN: CPT | Mod: PBBFAC | Performed by: PODIATRIST

## 2019-11-27 PROCEDURE — 99213 OFFICE O/P EST LOW 20 MIN: CPT | Mod: S$PBB,,, | Performed by: PODIATRIST

## 2019-11-27 PROCEDURE — 1159F MED LIST DOCD IN RCRD: CPT | Mod: ,,, | Performed by: PODIATRIST

## 2019-11-27 PROCEDURE — 1126F AMNT PAIN NOTED NONE PRSNT: CPT | Mod: ,,, | Performed by: PODIATRIST

## 2019-11-27 PROCEDURE — 1159F PR MEDICATION LIST DOCUMENTED IN MEDICAL RECORD: ICD-10-PCS | Mod: ,,, | Performed by: PODIATRIST

## 2019-12-01 PROBLEM — R26.81 UNSTEADY GAIT: Status: ACTIVE | Noted: 2019-12-01

## 2019-12-01 NOTE — PROGRESS NOTES
Subjective:       Patient ID: Cinthya Cuevas is a 84 y.o. female.    Chief Complaint: Diabetic Foot Exam   patient presents today for diabetic evaluation she also recently states that she fell out of her bed 1 morning about a week ago and today's the 1st day she has been able to put her shoe on her right foot.    Follow-up   Associated symptoms include arthralgias, joint swelling and numbness.   Nail Problem   Associated symptoms include arthralgias, joint swelling and numbness.   Foot Injury    Associated symptoms include numbness.   Foot Pain   Associated symptoms include arthralgias, joint swelling and numbness.     Review of Systems   Musculoskeletal: Positive for arthralgias, gait problem and joint swelling.   Neurological: Positive for numbness.   All other systems reviewed and are negative.      Objective:      Physical Exam   Constitutional: She appears well-developed and well-nourished.   Cardiovascular:   Pulses:       Dorsalis pedis pulses are 1+ on the right side, and 1+ on the left side.        Posterior tibial pulses are 0 on the right side, and 0 on the left side.   Musculoskeletal: She exhibits edema, tenderness and deformity.        Right foot: There is deformity.        Left foot: There is deformity.   Feet:   Right Foot:   Protective Sensation: 4 sites tested. 1 site sensed.  Skin Integrity: Positive for callus and dry skin.   Left Foot:   Protective Sensation: 4 sites tested. 1 site sensed.  Skin Integrity: Positive for callus and dry skin.   Neurological: She displays abnormal reflex.   Skin: Capillary refill takes more than 3 seconds.   Psychiatric: She has a normal mood and affect. Her behavior is normal. Judgment and thought content normal.   Nursing note and vitals reviewed.                    Assessment:       1. Type II diabetes mellitus with neurological manifestations    2. Skin ulcer of right foot, limited to breakdown of skin    3. Unsteady gait        Plan:       Patient presents  today for follow-up diabetic evaluation complete.  Patient has chronically ingrown toenails on both feet she has had to have these treated in trimmed for years she also has pre ulcerative hyperkeratotic lesions primarily at the distal aspect of the 2nd digit right secondary to contracture and sub 1st MPJ right these get a crease equally painful uncomfortable these have been infected before.  Patient continues to have severe balance problem she states she is awaiting the results of an MRI on her shoulder and she has been referred to physical therapy to help with strengthening.  Patient is currently using a walker all the time she states she still having problems with falling even while using a walker.  Following a complete diabetic evaluation the hyperkeratotic lesions were non excisionally debrided the ingrown toenails were debrided I plan to follow up with the patient in 6 weeks.  Patient was advised to contact us with any problems questions or concerns prior to her scheduled appointment.This note was created using [employer] voice recognition software that occasionally misinterpreted phrases or words.

## 2020-01-27 ENCOUNTER — OFFICE VISIT (OUTPATIENT)
Dept: PODIATRY | Facility: CLINIC | Age: 85
End: 2020-01-27
Payer: MEDICARE

## 2020-01-27 VITALS
DIASTOLIC BLOOD PRESSURE: 69 MMHG | HEIGHT: 62 IN | TEMPERATURE: 97 F | SYSTOLIC BLOOD PRESSURE: 137 MMHG | BODY MASS INDEX: 30.73 KG/M2 | WEIGHT: 167 LBS | HEART RATE: 83 BPM

## 2020-01-27 DIAGNOSIS — L97.511 SKIN ULCER OF RIGHT FOOT, LIMITED TO BREAKDOWN OF SKIN: Primary | ICD-10-CM

## 2020-01-27 DIAGNOSIS — L60.0 INGROWN NAIL: ICD-10-CM

## 2020-01-27 DIAGNOSIS — E11.49 TYPE II DIABETES MELLITUS WITH NEUROLOGICAL MANIFESTATIONS: ICD-10-CM

## 2020-01-27 PROCEDURE — 99213 PR OFFICE/OUTPT VISIT, EST, LEVL III, 20-29 MIN: ICD-10-PCS | Mod: S$PBB,,, | Performed by: PODIATRIST

## 2020-01-27 PROCEDURE — 99999 PR PBB SHADOW E&M-EST. PATIENT-LVL III: ICD-10-PCS | Mod: PBBFAC,,, | Performed by: PODIATRIST

## 2020-01-27 PROCEDURE — 99213 OFFICE O/P EST LOW 20 MIN: CPT | Mod: S$PBB,,, | Performed by: PODIATRIST

## 2020-01-27 PROCEDURE — 99213 OFFICE O/P EST LOW 20 MIN: CPT | Mod: PBBFAC | Performed by: PODIATRIST

## 2020-01-27 PROCEDURE — 99999 PR PBB SHADOW E&M-EST. PATIENT-LVL III: CPT | Mod: PBBFAC,,, | Performed by: PODIATRIST

## 2020-01-27 RX ORDER — MUPIROCIN 20 MG/G
OINTMENT TOPICAL
COMMUNITY
Start: 2020-01-17 | End: 2021-03-23

## 2020-01-27 NOTE — LETTER
February 1, 2020      JOCELYN Encarnacion  [address]   [address]  [place] [address]           [facility]  [address] [place] [address]  Phone: [phone]  Fax: [phone]          Patient: Cinthya Cuevas   MR Number: [id number]   YOB: 1935   Date of Visit: 1/27/2020       Dear Haley Clark:    Thank you for referring Cinthya Cuevas to me for evaluation. Attached you will find relevant portions of my assessment and plan of care.    If you have questions, please do not hesitate to call me. I look forward to following Cinthya Cuevas along with you.    Sincerely,    Theo Urias, ADOLFO    Enclosure  CC:  No Recipients    If you would like to receive this communication electronically, please contact [email or web address] or [phone] to request more information on [employer] access.    For providers and/or their staff who would like to refer a patient to [facility], please contact us through our one-stop-shop provider referral line, [facility], at [phone].    If you feel you have received this communication in error or would no longer like to receive these types of communications, please e-mail [email or web address]

## 2020-02-01 NOTE — PROGRESS NOTES
Subjective:       Patient ID: Cinthya Cuevas is a 85 y.o. female.    Chief Complaint: Follow-up; Nail Problem; and Diabetes Mellitus   patient presents today for diabetic evaluation she also recently states that she fell out of her bed 1 morning about a week ago and today's the 1st day she has been able to put her shoe on her right foot.    Follow-up   Associated symptoms include arthralgias, joint swelling and numbness.   Nail Problem   Associated symptoms include arthralgias, joint swelling and numbness.   Foot Injury    Associated symptoms include numbness.   Foot Pain   Associated symptoms include arthralgias, joint swelling and numbness.     Review of Systems   Musculoskeletal: Positive for arthralgias, gait problem and joint swelling.   Neurological: Positive for numbness.   All other systems reviewed and are negative.      Objective:      Physical Exam   Constitutional: She appears well-developed and well-nourished.   Cardiovascular:   Pulses:       Dorsalis pedis pulses are 1+ on the right side, and 1+ on the left side.        Posterior tibial pulses are 0 on the right side, and 0 on the left side.   Musculoskeletal: She exhibits edema, tenderness and deformity.        Right foot: There is deformity.        Left foot: There is deformity.   Feet:   Right Foot:   Protective Sensation: 4 sites tested. 1 site sensed.  Skin Integrity: Positive for callus and dry skin.   Left Foot:   Protective Sensation: 4 sites tested. 1 site sensed.  Skin Integrity: Positive for callus and dry skin.   Neurological: She displays abnormal reflex.   Skin: Capillary refill takes more than 3 seconds.   Psychiatric: She has a normal mood and affect. Her behavior is normal. Judgment and thought content normal.   Nursing note and vitals reviewed.            Assessment:       1. Skin ulcer of right foot, limited to breakdown of skin    2. Ingrown nail    3. Type II diabetes mellitus with neurological manifestations        Plan:        Patient presents today for follow-up diabetic evaluation complete.  Patient has chronically ingrown toenails on both feet she has had to have these treated in trimmed for years she also has pre ulcerative hyperkeratotic lesions primarily at the distal aspect of the 2nd digit right secondary to contracture and sub 1st MPJ right these get a crease equally painful uncomfortable these have been infected before.  Patient continues to have severe balance problem she states she is awaiting the results of an MRI on her shoulder and she has been referred to physical therapy to help with strengthening.  Patient is currently using a walker all the time she states she still having problems with falling even while using a walker.  Following a complete diabetic evaluation the hyperkeratotic lesions were non excisionally debrided the ingrown toenails were debrided I plan to follow up with the patient in 6 weeks.  Patient states she has fallen a couple times since I saw her last.  Patient was advised to contact us with any problems questions or concerns prior to her scheduled appointment.This note was created using [employer] voice recognition software that occasionally misinterpreted phrases or words.

## 2020-03-25 ENCOUNTER — OFFICE VISIT (OUTPATIENT)
Dept: PODIATRY | Facility: CLINIC | Age: 85
End: 2020-03-25
Payer: MEDICARE

## 2020-03-25 VITALS
BODY MASS INDEX: 30.73 KG/M2 | WEIGHT: 167 LBS | HEART RATE: 87 BPM | TEMPERATURE: 98 F | HEIGHT: 62 IN | DIASTOLIC BLOOD PRESSURE: 72 MMHG | SYSTOLIC BLOOD PRESSURE: 133 MMHG

## 2020-03-25 DIAGNOSIS — R26.81 UNSTEADY GAIT: ICD-10-CM

## 2020-03-25 DIAGNOSIS — E11.49 TYPE II DIABETES MELLITUS WITH NEUROLOGICAL MANIFESTATIONS: Primary | ICD-10-CM

## 2020-03-25 DIAGNOSIS — L97.511 SKIN ULCER OF RIGHT FOOT, LIMITED TO BREAKDOWN OF SKIN: ICD-10-CM

## 2020-03-25 DIAGNOSIS — L60.0 INGROWN NAIL: ICD-10-CM

## 2020-03-25 PROCEDURE — 99999 PR PBB SHADOW E&M-EST. PATIENT-LVL III: CPT | Mod: PBBFAC,,, | Performed by: PODIATRIST

## 2020-03-25 PROCEDURE — 99213 OFFICE O/P EST LOW 20 MIN: CPT | Mod: S$PBB,,, | Performed by: PODIATRIST

## 2020-03-25 PROCEDURE — 99213 PR OFFICE/OUTPT VISIT, EST, LEVL III, 20-29 MIN: ICD-10-PCS | Mod: S$PBB,,, | Performed by: PODIATRIST

## 2020-03-25 PROCEDURE — 99213 OFFICE O/P EST LOW 20 MIN: CPT | Mod: PBBFAC | Performed by: PODIATRIST

## 2020-03-25 PROCEDURE — 99999 PR PBB SHADOW E&M-EST. PATIENT-LVL III: ICD-10-PCS | Mod: PBBFAC,,, | Performed by: PODIATRIST

## 2020-03-25 NOTE — PROGRESS NOTES
Subjective:       Patient ID: Cinthya Cuevas is a 85 y.o. female.    Chief Complaint: Follow-up and Nail Problem   patient presents today for diabetic evaluation she also recently states that she fell out of her bed 1 morning about a week ago and today's the 1st day she has been able to put her shoe on her right foot.    Follow-up   Associated symptoms include arthralgias, joint swelling and numbness.   Nail Problem   Associated symptoms include arthralgias, joint swelling and numbness.   Foot Injury    Associated symptoms include numbness.   Foot Pain   Associated symptoms include arthralgias, joint swelling and numbness.     Review of Systems   Musculoskeletal: Positive for arthralgias, gait problem and joint swelling.   Neurological: Positive for numbness.   All other systems reviewed and are negative.      Objective:      Physical Exam   Constitutional: She appears well-developed and well-nourished.   Cardiovascular:   Pulses:       Dorsalis pedis pulses are 1+ on the right side, and 1+ on the left side.        Posterior tibial pulses are 0 on the right side, and 0 on the left side.   Musculoskeletal: She exhibits edema, tenderness and deformity.        Right foot: There is deformity.        Left foot: There is deformity.   Feet:   Right Foot:   Protective Sensation: 4 sites tested. 1 site sensed.  Skin Integrity: Positive for callus and dry skin.   Left Foot:   Protective Sensation: 4 sites tested. 1 site sensed.  Skin Integrity: Positive for callus and dry skin.   Neurological: She displays abnormal reflex.   Skin: Capillary refill takes more than 3 seconds.   Psychiatric: She has a normal mood and affect. Her behavior is normal. Judgment and thought content normal.   Nursing note and vitals reviewed.            Assessment:       1. Type II diabetes mellitus with neurological manifestations    2. Ingrown nail    3. Skin ulcer of right foot, limited to breakdown of skin    4. Unsteady gait        Plan:        Patient presents today for follow-up diabetic evaluation complete.  Patient has chronically ingrown toenails on both feet she has had to have these treated in trimmed for years she also has pre ulcerative hyperkeratotic lesions primarily at the distal aspect of the 2nd digit right secondary to contracture and sub 1st MPJ right these get a crease equally painful uncomfortable these have been infected before.  Patient continues to have severe balance problem she states she is awaiting the results of an MRI on her shoulder and she has been referred to physical therapy to help with strengthening.  Patient is currently using a walker all the time she states she still having problems with falling even while using a walker.  Following a complete diabetic evaluation the hyperkeratotic lesions were non excisionally debrided the ingrown toenails were debrided I plan to follow up with the patient in 12 weeks.  Patient continues to have unsteady gait he utilizing a walker she states she has not fallen since December she has been very careful about everything she has been doing.  Patient was advised to contact us with any problems questions or concerns prior to her scheduled appointment.This note was created using [employer] voice recognition software that occasionally misinterpreted phrases or words.

## 2020-07-15 ENCOUNTER — OFFICE VISIT (OUTPATIENT)
Dept: PODIATRY | Facility: CLINIC | Age: 85
End: 2020-07-15
Payer: MEDICARE

## 2020-07-15 VITALS
HEIGHT: 63 IN | TEMPERATURE: 99 F | DIASTOLIC BLOOD PRESSURE: 67 MMHG | SYSTOLIC BLOOD PRESSURE: 153 MMHG | BODY MASS INDEX: 26.93 KG/M2 | HEART RATE: 81 BPM | WEIGHT: 152 LBS

## 2020-07-15 DIAGNOSIS — L60.0 INGROWN NAIL: ICD-10-CM

## 2020-07-15 DIAGNOSIS — L97.511 SKIN ULCER OF RIGHT FOOT, LIMITED TO BREAKDOWN OF SKIN: ICD-10-CM

## 2020-07-15 DIAGNOSIS — E11.49 TYPE II DIABETES MELLITUS WITH NEUROLOGICAL MANIFESTATIONS: Primary | ICD-10-CM

## 2020-07-15 DIAGNOSIS — R26.81 UNSTEADY GAIT: ICD-10-CM

## 2020-07-15 PROCEDURE — 99999 PR PBB SHADOW E&M-EST. PATIENT-LVL IV: ICD-10-PCS | Mod: PBBFAC,,, | Performed by: PODIATRIST

## 2020-07-15 PROCEDURE — 99213 PR OFFICE/OUTPT VISIT, EST, LEVL III, 20-29 MIN: ICD-10-PCS | Mod: S$PBB,,, | Performed by: PODIATRIST

## 2020-07-15 PROCEDURE — 99999 PR PBB SHADOW E&M-EST. PATIENT-LVL IV: CPT | Mod: PBBFAC,,, | Performed by: PODIATRIST

## 2020-07-15 PROCEDURE — 99214 OFFICE O/P EST MOD 30 MIN: CPT | Mod: PBBFAC | Performed by: PODIATRIST

## 2020-07-15 PROCEDURE — 99213 OFFICE O/P EST LOW 20 MIN: CPT | Mod: S$PBB,,, | Performed by: PODIATRIST

## 2020-07-15 RX ORDER — METFORMIN HYDROCHLORIDE 500 MG/1
500 TABLET, EXTENDED RELEASE ORAL NIGHTLY
COMMUNITY
Start: 2020-05-15 | End: 2021-08-17 | Stop reason: SDUPTHER

## 2020-07-15 RX ORDER — LANCETS 28 GAUGE
EACH MISCELLANEOUS
COMMUNITY
Start: 2020-04-16

## 2020-07-15 RX ORDER — BLOOD-GLUCOSE METER
KIT MISCELLANEOUS
COMMUNITY
Start: 2020-04-16

## 2020-07-15 RX ORDER — BLOOD-GLUCOSE METER
EACH MISCELLANEOUS
COMMUNITY
Start: 2020-04-16

## 2020-07-15 NOTE — LETTER
July 15, 2020      Haley Clark, JOCELYN  [address]  [address]  [facility] [address]

## 2020-07-16 NOTE — PROGRESS NOTES
Subjective:       Patient ID: Cinthya Cuevas is a 85 y.o. female.    Chief Complaint: Follow-up, Ingrown Toenail, Diabetes Mellitus, and Nail Problem   patient presents today for diabetic evaluation she also recently states that she fell out of her bed 1 morning about a week ago and today's the 1st day she has been able to put her shoe on her right foot.    Follow-up  Associated symptoms include arthralgias, joint swelling and numbness.   Nail Problem  Associated symptoms include arthralgias, joint swelling and numbness.   Foot Injury   Associated symptoms include numbness.   Foot Pain  Associated symptoms include arthralgias, joint swelling and numbness.   Ingrown Toenail  Associated symptoms include arthralgias, joint swelling and numbness.     Review of Systems   Musculoskeletal: Positive for arthralgias, gait problem and joint swelling.   Neurological: Positive for numbness.   All other systems reviewed and are negative.      Objective:      Physical Exam  Vitals signs and nursing note reviewed.   Constitutional:       Appearance: She is well-developed.   Cardiovascular:      Pulses:           Dorsalis pedis pulses are 1+ on the right side and 1+ on the left side.        Posterior tibial pulses are 0 on the right side and 0 on the left side.   Musculoskeletal:         General: Tenderness and deformity present.      Right foot: Deformity present.      Left foot: Deformity present.   Feet:      Right foot:      Protective Sensation: 4 sites tested. 1 site sensed.     Skin integrity: Callus and dry skin present.      Left foot:      Protective Sensation: 4 sites tested. 1 site sensed.     Skin integrity: Callus and dry skin present.   Skin:     Capillary Refill: Capillary refill takes more than 3 seconds.   Neurological:      Deep Tendon Reflexes: Reflexes abnormal.   Psychiatric:         Behavior: Behavior normal.         Thought Content: Thought content normal.         Judgment: Judgment normal.                        Assessment:       1. Type II diabetes mellitus with neurological manifestations    2. Unsteady gait    3. Ingrown nail    4. Skin ulcer of right foot, limited to breakdown of skin        Plan:       Patient presents today for follow-up diabetic evaluation complete.  Patient has chronically ingrown toenails on both feet she has had to have these treated in trimmed for years she also has pre ulcerative hyperkeratotic lesions primarily at the distal aspect of the 2nd digit right secondary to contracture and sub 1st MPJ right these get a crease equally painful uncomfortable these have been infected before.  Patient continues to have severe balance problem she states she is awaiting the results of an MRI on her shoulder and she has been referred to physical therapy to help with strengthening.  Patient is currently using a walker all the time she states she still having problems with falling even while using a walker.  Following a complete diabetic evaluation the hyperkeratotic lesions were non excisionally debrided the ingrown toenails were debrided I plan to follow up with the patient in 12 weeks.  Patient continues to have unsteady gait he utilizing a walker she states she has not fallen since December she has been very careful about everything she has been doing.  Patient was advised to contact us with any problems questions or concerns prior to her scheduled appointment.This note was created using [employer] voice recognition software that occasionally misinterpreted phrases or words.

## 2020-10-14 ENCOUNTER — OFFICE VISIT (OUTPATIENT)
Dept: PODIATRY | Facility: CLINIC | Age: 85
End: 2020-10-14
Payer: MEDICARE

## 2020-10-14 VITALS
DIASTOLIC BLOOD PRESSURE: 66 MMHG | SYSTOLIC BLOOD PRESSURE: 111 MMHG | TEMPERATURE: 97 F | HEIGHT: 63 IN | WEIGHT: 152 LBS | HEART RATE: 65 BPM | BODY MASS INDEX: 26.93 KG/M2

## 2020-10-14 DIAGNOSIS — L97.511 SKIN ULCER OF RIGHT FOOT, LIMITED TO BREAKDOWN OF SKIN: ICD-10-CM

## 2020-10-14 DIAGNOSIS — L60.0 INGROWN NAIL: ICD-10-CM

## 2020-10-14 DIAGNOSIS — R26.81 UNSTEADY GAIT: ICD-10-CM

## 2020-10-14 DIAGNOSIS — E11.49 TYPE II DIABETES MELLITUS WITH NEUROLOGICAL MANIFESTATIONS: Primary | ICD-10-CM

## 2020-10-14 PROCEDURE — 99999 PR PBB SHADOW E&M-EST. PATIENT-LVL IV: ICD-10-PCS | Mod: PBBFAC,,, | Performed by: PODIATRIST

## 2020-10-14 PROCEDURE — 99213 OFFICE O/P EST LOW 20 MIN: CPT | Mod: S$PBB,,, | Performed by: PODIATRIST

## 2020-10-14 PROCEDURE — 99214 OFFICE O/P EST MOD 30 MIN: CPT | Mod: PBBFAC | Performed by: PODIATRIST

## 2020-10-14 PROCEDURE — 99213 PR OFFICE/OUTPT VISIT, EST, LEVL III, 20-29 MIN: ICD-10-PCS | Mod: S$PBB,,, | Performed by: PODIATRIST

## 2020-10-14 PROCEDURE — 99999 PR PBB SHADOW E&M-EST. PATIENT-LVL IV: CPT | Mod: PBBFAC,,, | Performed by: PODIATRIST

## 2020-10-14 NOTE — PROGRESS NOTES
Subjective:       Patient ID: Cinthya Cuevas is a 85 y.o. female.    Chief Complaint: Follow-up, Diabetes Mellitus, Ingrown Toenail, and Nail Problem   patient presents today for diabetic evaluation she also recently states that she fell out of her bed 1 morning about a week ago and today's the 1st day she has been able to put her shoe on her right foot.    Follow-up  Associated symptoms include arthralgias, joint swelling and numbness.   Ingrown Toenail  Associated symptoms include arthralgias, joint swelling and numbness.   Nail Problem  Associated symptoms include arthralgias, joint swelling and numbness.   Foot Injury   Associated symptoms include numbness.   Foot Pain  Associated symptoms include arthralgias, joint swelling and numbness.     Review of Systems   Musculoskeletal: Positive for arthralgias, gait problem and joint swelling.   Neurological: Positive for numbness.   All other systems reviewed and are negative.      Objective:      Physical Exam  Vitals signs and nursing note reviewed.       Constitutional:       Appearance: She is well-developed.   Cardiovascular:      Pulses:           Dorsalis pedis pulses are 1+ on the right side and 1+ on the left side.        Posterior tibial pulses are 0 on the right side and 0 on the left side.   Musculoskeletal:         General: Tenderness and deformity present.      Right foot: Deformity present.      Left foot: Deformity present.   Feet:      Right foot:      Protective Sensation: 4 sites tested. 1 site sensed.     Skin integrity: Callus and dry skin present.      Left foot:      Protective Sensation: 4 sites tested. 1 site sensed.     Skin integrity: Callus and dry skin present.   Skin:     Capillary Refill: Capillary refill takes more than 3 seconds.   Neurological:      Deep Tendon Reflexes: Reflexes abnormal.   Psychiatric:         Behavior: Behavior normal.         Thought Content: Thought content normal.         Judgment: Judgment normal.                        Assessment:       1. Type II diabetes mellitus with neurological manifestations    2. Ingrown nail    3. Skin ulcer of right foot, limited to breakdown of skin    4. Unsteady gait        Plan:       Patient presents today for follow-up diabetic evaluation complete.  Patient has chronically ingrown toenails on both feet she has had to have these treated in trimmed for years she also has pre ulcerative hyperkeratotic lesions primarily at the distal aspect of the 2nd digit right secondary to contracture and sub 1st MPJ right these get a crease equally painful uncomfortable these have been infected before.  Patient continues to have severe balance problem she states she is awaiting the results of an MRI on her shoulder and she has been referred to physical therapy to help with strengthening.  Patient is currently using a walker all the time she states she still having problems with falling even while using a walker.  Following a complete diabetic evaluation the hyperkeratotic lesions were non excisionally debrided the ingrown toenails were debrided I plan to follow up with the patient in 10 weeks.  Patient continues to have unsteady gait he utilizing a walker she states she has not fallen since December she has been very careful about everything she has been doing.  Patient was advised to contact us with any problems questions or concerns prior to her scheduled appointment.This note was created using [employer] voice recognition software that occasionally misinterpreted phrases or words.

## 2020-11-04 ENCOUNTER — OFFICE VISIT (OUTPATIENT)
Dept: PODIATRY | Facility: CLINIC | Age: 85
End: 2020-11-04
Payer: MEDICARE

## 2020-11-04 VITALS
WEIGHT: 152 LBS | BODY MASS INDEX: 26.93 KG/M2 | HEIGHT: 63 IN | DIASTOLIC BLOOD PRESSURE: 80 MMHG | SYSTOLIC BLOOD PRESSURE: 136 MMHG | HEART RATE: 78 BPM | TEMPERATURE: 97 F

## 2020-11-04 DIAGNOSIS — E11.49 TYPE II DIABETES MELLITUS WITH NEUROLOGICAL MANIFESTATIONS: ICD-10-CM

## 2020-11-04 DIAGNOSIS — L97.511 SKIN ULCER OF RIGHT FOOT, LIMITED TO BREAKDOWN OF SKIN: Primary | ICD-10-CM

## 2020-11-04 PROCEDURE — 99999 PR PBB SHADOW E&M-EST. PATIENT-LVL IV: ICD-10-PCS | Mod: PBBFAC,,, | Performed by: PODIATRIST

## 2020-11-04 PROCEDURE — 99213 PR OFFICE/OUTPT VISIT, EST, LEVL III, 20-29 MIN: ICD-10-PCS | Mod: S$PBB,,, | Performed by: PODIATRIST

## 2020-11-04 PROCEDURE — 99213 OFFICE O/P EST LOW 20 MIN: CPT | Mod: S$PBB,,, | Performed by: PODIATRIST

## 2020-11-04 PROCEDURE — 99214 OFFICE O/P EST MOD 30 MIN: CPT | Mod: PBBFAC | Performed by: PODIATRIST

## 2020-11-04 PROCEDURE — 99999 PR PBB SHADOW E&M-EST. PATIENT-LVL IV: CPT | Mod: PBBFAC,,, | Performed by: PODIATRIST

## 2020-11-08 NOTE — PROGRESS NOTES
Subjective:       Patient ID: Cinthya Cuevas is a 85 y.o. female.    Chief Complaint: Follow-up, Foot Pain, and Nail Problem   Patient presents today for diabetic evaluation.      Follow-up  Associated symptoms include arthralgias, joint swelling and numbness.   Ingrown Toenail  Associated symptoms include arthralgias, joint swelling and numbness.   Nail Problem  Associated symptoms include arthralgias, joint swelling and numbness.   Foot Injury   Associated symptoms include numbness.   Foot Pain  Associated symptoms include arthralgias, joint swelling and numbness.     Review of Systems   Musculoskeletal: Positive for arthralgias, gait problem and joint swelling.   Neurological: Positive for numbness.   All other systems reviewed and are negative.      Objective:      Physical Exam  Vitals signs and nursing note reviewed.   Constitutional:       Appearance: She is well-developed.   Cardiovascular:      Pulses:           Dorsalis pedis pulses are 1+ on the right side and 1+ on the left side.        Posterior tibial pulses are 0 on the right side and 0 on the left side.   Musculoskeletal:         General: Tenderness and deformity present.      Right foot: Deformity present.      Left foot: Deformity present.   Feet:      Right foot:      Protective Sensation: 4 sites tested. 1 site sensed.     Skin integrity: Callus and dry skin present.      Left foot:      Protective Sensation: 4 sites tested. 1 site sensed.     Skin integrity: Callus and dry skin present.   Skin:     Capillary Refill: Capillary refill takes more than 3 seconds.   Neurological:      Deep Tendon Reflexes: Reflexes abnormal.   Psychiatric:         Behavior: Behavior normal.         Thought Content: Thought content normal.         Judgment: Judgment normal.                               Assessment:       1. Skin ulcer of right foot, limited to breakdown of skin    2. Type II diabetes mellitus with neurological manifestations        Plan:        Patient presents today for follow-up diabetic evaluation complete.  Patient has chronically ingrown toenails on both feet she has had to have these treated in trimmed for years she also has pre ulcerative hyperkeratotic lesions primarily at the distal aspect of the 2nd digit right secondary to contracture and sub 1st MPJ right these get a crease equally painful uncomfortable these have been infected before.  Patient continues to have severe balance problem she states she is awaiting the results of an MRI on her shoulder and she has been referred to physical therapy to help with strengthening.  Patient is currently using a walker all the time she states she still having problems with falling even while using a walker.  Following a complete diabetic evaluation the hyperkeratotic lesions were non excisionally debrided the ingrown toenails were debrided I plan to follow up with the patient in 10 weeks.  Patient is come in for an early appointment much earlier than expected she still been experiencing pain at the tip of the 2nd digit right foot patient does have a pre ulcerative hyperkeratotic lesion this was trimmed on her last visit however she states it has been still very painful sore I did aggressively non excisionally debride the area and removed additional nail bacitracin ointment and a light dressing applied patient advised to leave this on here for the next 24 hr she did notice immediate relief following debridement I have advised her she is not doing better in a few days to contact us for follow-up further evaluation and treatment.  Patient continues to have unsteady gait he utilizing a walker she states she has not fallen since December she has been very careful about everything she has been doing.  Patient was advised to contact us with any problems questions or concerns prior to her scheduled appointment.This note was created using [employer] voice recognition software that occasionally misinterpreted phrases  or words.

## 2020-12-23 ENCOUNTER — TELEPHONE (OUTPATIENT)
Dept: PODIATRY | Facility: CLINIC | Age: 85
End: 2020-12-23

## 2020-12-23 NOTE — TELEPHONE ENCOUNTER
----- Message from Princess ERA Solis sent at 12/23/2020  4:13 PM CST -----  Contact: pt  Type:  Patient Returning Call    Who Called:  patient   Who Left Message for Patient:  Nurse   Does the patient know what this is regarding?:  yes   Best Call Back Number:  [phone]    Additional Information:  patient would like to fiorella her toenails done before the 8th. She is going out of town for 3 weeks.

## 2021-01-06 ENCOUNTER — OFFICE VISIT (OUTPATIENT)
Dept: PODIATRY | Facility: CLINIC | Age: 86
End: 2021-01-06
Payer: MEDICARE

## 2021-01-06 VITALS
HEIGHT: 63 IN | WEIGHT: 152 LBS | BODY MASS INDEX: 26.93 KG/M2 | RESPIRATION RATE: 19 BRPM | DIASTOLIC BLOOD PRESSURE: 79 MMHG | SYSTOLIC BLOOD PRESSURE: 139 MMHG | HEART RATE: 99 BPM | OXYGEN SATURATION: 98 % | TEMPERATURE: 98 F

## 2021-01-06 DIAGNOSIS — L97.511 SKIN ULCER OF RIGHT FOOT, LIMITED TO BREAKDOWN OF SKIN: ICD-10-CM

## 2021-01-06 DIAGNOSIS — L60.0 INGROWN NAIL: ICD-10-CM

## 2021-01-06 DIAGNOSIS — E11.49 TYPE II DIABETES MELLITUS WITH NEUROLOGICAL MANIFESTATIONS: Primary | ICD-10-CM

## 2021-01-06 DIAGNOSIS — R26.81 UNSTEADY GAIT: ICD-10-CM

## 2021-01-06 PROCEDURE — 99999 PR PBB SHADOW E&M-EST. PATIENT-LVL V: ICD-10-PCS | Mod: PBBFAC,,, | Performed by: PODIATRIST

## 2021-01-06 PROCEDURE — 99999 PR PBB SHADOW E&M-EST. PATIENT-LVL V: CPT | Mod: PBBFAC,,, | Performed by: PODIATRIST

## 2021-01-06 PROCEDURE — 99213 PR OFFICE/OUTPT VISIT, EST, LEVL III, 20-29 MIN: ICD-10-PCS | Mod: S$PBB,,, | Performed by: PODIATRIST

## 2021-01-06 PROCEDURE — 99213 OFFICE O/P EST LOW 20 MIN: CPT | Mod: S$PBB,,, | Performed by: PODIATRIST

## 2021-01-06 PROCEDURE — 99215 OFFICE O/P EST HI 40 MIN: CPT | Mod: PBBFAC | Performed by: PODIATRIST

## 2021-01-09 PROBLEM — S99.921A FOOT INJURY, RIGHT, INITIAL ENCOUNTER: Status: RESOLVED | Noted: 2019-06-17 | Resolved: 2021-01-09

## 2021-03-22 ENCOUNTER — OFFICE VISIT (OUTPATIENT)
Dept: PODIATRY | Facility: CLINIC | Age: 86
End: 2021-03-22
Payer: MEDICARE

## 2021-03-22 VITALS
BODY MASS INDEX: 27.29 KG/M2 | WEIGHT: 154 LBS | HEIGHT: 63 IN | SYSTOLIC BLOOD PRESSURE: 142 MMHG | HEART RATE: 83 BPM | DIASTOLIC BLOOD PRESSURE: 68 MMHG | TEMPERATURE: 98 F

## 2021-03-22 DIAGNOSIS — E11.49 TYPE II DIABETES MELLITUS WITH NEUROLOGICAL MANIFESTATIONS: Primary | ICD-10-CM

## 2021-03-22 DIAGNOSIS — L97.511 SKIN ULCER OF RIGHT FOOT, LIMITED TO BREAKDOWN OF SKIN: ICD-10-CM

## 2021-03-22 DIAGNOSIS — R26.81 UNSTEADY GAIT: ICD-10-CM

## 2021-03-22 DIAGNOSIS — L60.0 INGROWN NAIL: ICD-10-CM

## 2021-03-22 PROCEDURE — 99213 PR OFFICE/OUTPT VISIT, EST, LEVL III, 20-29 MIN: ICD-10-PCS | Mod: S$PBB,,, | Performed by: PODIATRIST

## 2021-03-22 PROCEDURE — 99213 OFFICE O/P EST LOW 20 MIN: CPT | Mod: S$PBB,,, | Performed by: PODIATRIST

## 2021-03-22 PROCEDURE — 99999 PR PBB SHADOW E&M-EST. PATIENT-LVL IV: CPT | Mod: PBBFAC,,, | Performed by: PODIATRIST

## 2021-03-22 PROCEDURE — 99214 OFFICE O/P EST MOD 30 MIN: CPT | Mod: PBBFAC | Performed by: PODIATRIST

## 2021-03-22 PROCEDURE — 99999 PR PBB SHADOW E&M-EST. PATIENT-LVL IV: ICD-10-PCS | Mod: PBBFAC,,, | Performed by: PODIATRIST

## 2021-03-22 RX ORDER — FERROUS SULFATE 325(65) MG
325 TABLET, DELAYED RELEASE (ENTERIC COATED) ORAL
COMMUNITY
Start: 2020-05-11 | End: 2021-08-17

## 2021-03-22 RX ORDER — AMITRIPTYLINE HYDROCHLORIDE 25 MG/1
25 TABLET, FILM COATED ORAL NIGHTLY
COMMUNITY
Start: 2020-05-11 | End: 2021-08-17 | Stop reason: SDUPTHER

## 2021-03-22 RX ORDER — ATORVASTATIN CALCIUM 40 MG/1
40 TABLET, FILM COATED ORAL
COMMUNITY
Start: 2020-07-27 | End: 2021-05-18 | Stop reason: SDUPTHER

## 2021-03-22 RX ORDER — FUROSEMIDE 20 MG/1
TABLET ORAL
COMMUNITY
Start: 2020-07-27 | End: 2021-08-17

## 2021-03-22 RX ORDER — POTASSIUM CHLORIDE 750 MG/1
CAPSULE, EXTENDED RELEASE ORAL
COMMUNITY
Start: 2020-07-27 | End: 2021-07-12 | Stop reason: SDUPTHER

## 2021-05-17 ENCOUNTER — OFFICE VISIT (OUTPATIENT)
Dept: PODIATRY | Facility: CLINIC | Age: 86
End: 2021-05-17
Payer: MEDICARE

## 2021-05-17 VITALS
WEIGHT: 154 LBS | BODY MASS INDEX: 27.29 KG/M2 | HEART RATE: 90 BPM | SYSTOLIC BLOOD PRESSURE: 119 MMHG | RESPIRATION RATE: 18 BRPM | DIASTOLIC BLOOD PRESSURE: 67 MMHG | HEIGHT: 63 IN

## 2021-05-17 DIAGNOSIS — R26.81 UNSTEADY GAIT: ICD-10-CM

## 2021-05-17 DIAGNOSIS — L60.0 INGROWN NAIL: ICD-10-CM

## 2021-05-17 DIAGNOSIS — E11.49 TYPE II DIABETES MELLITUS WITH NEUROLOGICAL MANIFESTATIONS: Primary | ICD-10-CM

## 2021-05-17 DIAGNOSIS — L97.511 SKIN ULCER OF RIGHT FOOT, LIMITED TO BREAKDOWN OF SKIN: ICD-10-CM

## 2021-05-17 PROCEDURE — 99213 OFFICE O/P EST LOW 20 MIN: CPT | Mod: S$PBB,,, | Performed by: PODIATRIST

## 2021-05-17 PROCEDURE — 99999 PR PBB SHADOW E&M-EST. PATIENT-LVL V: ICD-10-PCS | Mod: PBBFAC,,, | Performed by: PODIATRIST

## 2021-05-17 PROCEDURE — 99215 OFFICE O/P EST HI 40 MIN: CPT | Mod: PBBFAC | Performed by: PODIATRIST

## 2021-05-17 PROCEDURE — 99213 PR OFFICE/OUTPT VISIT, EST, LEVL III, 20-29 MIN: ICD-10-PCS | Mod: S$PBB,,, | Performed by: PODIATRIST

## 2021-05-17 PROCEDURE — 99999 PR PBB SHADOW E&M-EST. PATIENT-LVL V: CPT | Mod: PBBFAC,,, | Performed by: PODIATRIST

## 2021-05-17 RX ORDER — AMITRIPTYLINE HYDROCHLORIDE 25 MG/1
25 TABLET, FILM COATED ORAL
COMMUNITY
Start: 2021-04-26 | End: 2021-05-18 | Stop reason: SDUPTHER

## 2021-07-12 ENCOUNTER — OFFICE VISIT (OUTPATIENT)
Dept: PODIATRY | Facility: CLINIC | Age: 86
End: 2021-07-12
Payer: MEDICARE

## 2021-07-12 VITALS
RESPIRATION RATE: 18 BRPM | SYSTOLIC BLOOD PRESSURE: 155 MMHG | DIASTOLIC BLOOD PRESSURE: 65 MMHG | HEART RATE: 74 BPM | HEIGHT: 63 IN | WEIGHT: 152 LBS | BODY MASS INDEX: 26.93 KG/M2

## 2021-07-12 DIAGNOSIS — E11.49 TYPE II DIABETES MELLITUS WITH NEUROLOGICAL MANIFESTATIONS: Primary | ICD-10-CM

## 2021-07-12 DIAGNOSIS — E11.9 COMPREHENSIVE DIABETIC FOOT EXAMINATION, TYPE 2 DM, ENCOUNTER FOR: ICD-10-CM

## 2021-07-12 DIAGNOSIS — L60.0 INGROWN NAIL: ICD-10-CM

## 2021-07-12 DIAGNOSIS — R26.81 UNSTEADY GAIT: ICD-10-CM

## 2021-07-12 PROCEDURE — 99999 PR PBB SHADOW E&M-EST. PATIENT-LVL V: CPT | Mod: PBBFAC,,, | Performed by: PODIATRIST

## 2021-07-12 PROCEDURE — 99215 OFFICE O/P EST HI 40 MIN: CPT | Mod: PBBFAC | Performed by: PODIATRIST

## 2021-07-12 PROCEDURE — 99213 PR OFFICE/OUTPT VISIT, EST, LEVL III, 20-29 MIN: ICD-10-PCS | Mod: S$PBB,,, | Performed by: PODIATRIST

## 2021-07-12 PROCEDURE — 99213 OFFICE O/P EST LOW 20 MIN: CPT | Mod: S$PBB,,, | Performed by: PODIATRIST

## 2021-07-12 PROCEDURE — 99999 PR PBB SHADOW E&M-EST. PATIENT-LVL V: ICD-10-PCS | Mod: PBBFAC,,, | Performed by: PODIATRIST

## 2021-07-12 RX ORDER — POTASSIUM CHLORIDE 750 MG/1
10 TABLET, EXTENDED RELEASE ORAL
COMMUNITY
Start: 2020-07-27 | End: 2021-07-27

## 2021-07-12 RX ORDER — ATORVASTATIN CALCIUM 40 MG/1
40 TABLET, FILM COATED ORAL
COMMUNITY
Start: 2020-07-27 | End: 2021-07-12 | Stop reason: SDUPTHER

## 2021-07-12 RX ORDER — MUPIROCIN 20 MG/G
OINTMENT TOPICAL
COMMUNITY
Start: 2020-12-10 | End: 2021-08-17

## 2021-07-12 RX ORDER — AMITRIPTYLINE HYDROCHLORIDE 25 MG/1
25 TABLET, FILM COATED ORAL
COMMUNITY
Start: 2021-04-26 | End: 2021-07-12 | Stop reason: SDUPTHER

## 2021-08-17 ENCOUNTER — OFFICE VISIT (OUTPATIENT)
Dept: FAMILY MEDICINE | Facility: CLINIC | Age: 86
End: 2021-08-17
Payer: MEDICARE

## 2021-08-17 VITALS
OXYGEN SATURATION: 95 % | WEIGHT: 161.94 LBS | TEMPERATURE: 98 F | HEART RATE: 75 BPM | SYSTOLIC BLOOD PRESSURE: 144 MMHG | DIASTOLIC BLOOD PRESSURE: 63 MMHG | BODY MASS INDEX: 28.69 KG/M2 | HEIGHT: 63 IN

## 2021-08-17 DIAGNOSIS — Z13.29 SCREENING FOR THYROID DISORDER: ICD-10-CM

## 2021-08-17 DIAGNOSIS — Z11.59 NEED FOR HEPATITIS C SCREENING TEST: ICD-10-CM

## 2021-08-17 DIAGNOSIS — G47.9 SLEEP DISORDER: ICD-10-CM

## 2021-08-17 DIAGNOSIS — R20.2 PARESTHESIA OF SKIN: ICD-10-CM

## 2021-08-17 DIAGNOSIS — R26.81 UNSTEADY GAIT: ICD-10-CM

## 2021-08-17 DIAGNOSIS — E68 SEQUELAE OF HYPERALIMENTATION: ICD-10-CM

## 2021-08-17 DIAGNOSIS — Z76.89 ESTABLISHING CARE WITH NEW DOCTOR, ENCOUNTER FOR: ICD-10-CM

## 2021-08-17 DIAGNOSIS — E11.49 TYPE II DIABETES MELLITUS WITH NEUROLOGICAL MANIFESTATIONS: Primary | ICD-10-CM

## 2021-08-17 DIAGNOSIS — Z11.4 SCREENING FOR HIV WITHOUT PRESENCE OF RISK FACTORS: ICD-10-CM

## 2021-08-17 PROCEDURE — 99999 PR PBB SHADOW E&M-EST. PATIENT-LVL III: ICD-10-PCS | Mod: PBBFAC,,, | Performed by: FAMILY MEDICINE

## 2021-08-17 PROCEDURE — 99205 PR OFFICE/OUTPT VISIT, NEW, LEVL V, 60-74 MIN: ICD-10-PCS | Mod: S$PBB,,, | Performed by: FAMILY MEDICINE

## 2021-08-17 PROCEDURE — 99205 OFFICE O/P NEW HI 60 MIN: CPT | Mod: S$PBB,,, | Performed by: FAMILY MEDICINE

## 2021-08-17 PROCEDURE — 99999 PR PBB SHADOW E&M-EST. PATIENT-LVL III: CPT | Mod: PBBFAC,,, | Performed by: FAMILY MEDICINE

## 2021-08-17 PROCEDURE — 99213 OFFICE O/P EST LOW 20 MIN: CPT | Mod: PBBFAC,PN | Performed by: FAMILY MEDICINE

## 2021-08-17 RX ORDER — METFORMIN HYDROCHLORIDE 500 MG/1
500 TABLET, EXTENDED RELEASE ORAL NIGHTLY
Qty: 90 TABLET | Refills: 3 | Status: SHIPPED | OUTPATIENT
Start: 2021-08-17 | End: 2022-03-17 | Stop reason: SDUPTHER

## 2021-08-17 RX ORDER — AMITRIPTYLINE HYDROCHLORIDE 25 MG/1
25 TABLET, FILM COATED ORAL NIGHTLY
Qty: 90 TABLET | Refills: 3 | Status: SHIPPED | OUTPATIENT
Start: 2021-08-17 | End: 2021-08-17

## 2021-08-17 RX ORDER — TRAZODONE HYDROCHLORIDE 50 MG/1
50 TABLET ORAL NIGHTLY PRN
Qty: 90 TABLET | Refills: 3 | Status: SHIPPED | OUTPATIENT
Start: 2021-08-17 | End: 2022-03-17 | Stop reason: SDUPTHER

## 2021-08-17 RX ORDER — ATORVASTATIN CALCIUM 40 MG/1
40 TABLET, FILM COATED ORAL NIGHTLY
Qty: 90 TABLET | Refills: 3 | Status: SHIPPED | OUTPATIENT
Start: 2021-08-17 | End: 2022-06-17 | Stop reason: SDUPTHER

## 2021-08-17 RX ORDER — ASPIRIN 81 MG/1
81 TABLET ORAL WEEKLY
COMMUNITY
End: 2022-03-17

## 2021-09-08 ENCOUNTER — LAB VISIT (OUTPATIENT)
Dept: LAB | Facility: HOSPITAL | Age: 86
End: 2021-09-08
Attending: FAMILY MEDICINE
Payer: MEDICARE

## 2021-09-08 ENCOUNTER — LAB VISIT (OUTPATIENT)
Dept: FAMILY MEDICINE | Facility: CLINIC | Age: 86
End: 2021-09-08
Payer: MEDICARE

## 2021-09-08 DIAGNOSIS — E11.49 TYPE II DIABETES MELLITUS WITH NEUROLOGICAL MANIFESTATIONS: ICD-10-CM

## 2021-09-08 DIAGNOSIS — Z11.59 NEED FOR HEPATITIS C SCREENING TEST: ICD-10-CM

## 2021-09-08 DIAGNOSIS — Z13.29 SCREENING FOR THYROID DISORDER: ICD-10-CM

## 2021-09-08 DIAGNOSIS — E68 SEQUELAE OF HYPERALIMENTATION: ICD-10-CM

## 2021-09-08 DIAGNOSIS — Z11.4 SCREENING FOR HIV WITHOUT PRESENCE OF RISK FACTORS: ICD-10-CM

## 2021-09-08 DIAGNOSIS — R20.2 PARESTHESIA OF SKIN: ICD-10-CM

## 2021-09-08 LAB
ALBUMIN SERPL BCP-MCNC: 3.2 G/DL (ref 3.5–5.2)
ALBUMIN/CREAT UR: 90.6 UG/MG (ref 0–30)
ALP SERPL-CCNC: 94 U/L (ref 55–135)
ALT SERPL W/O P-5'-P-CCNC: 15 U/L (ref 10–44)
ANION GAP SERPL CALC-SCNC: 13 MMOL/L (ref 8–16)
AST SERPL-CCNC: 15 U/L (ref 10–40)
BASOPHILS # BLD AUTO: 0.05 K/UL (ref 0–0.2)
BASOPHILS NFR BLD: 0.6 % (ref 0–1.9)
BILIRUB SERPL-MCNC: 0.5 MG/DL (ref 0.1–1)
BILIRUB UR QL STRIP: NEGATIVE
BUN SERPL-MCNC: 22 MG/DL (ref 8–23)
CALCIUM SERPL-MCNC: 9.9 MG/DL (ref 8.7–10.5)
CHLORIDE SERPL-SCNC: 104 MMOL/L (ref 95–110)
CHOLEST SERPL-MCNC: 154 MG/DL (ref 120–199)
CHOLEST/HDLC SERPL: 4.1 {RATIO} (ref 2–5)
CLARITY UR: CLEAR
CO2 SERPL-SCNC: 22 MMOL/L (ref 23–29)
COLOR UR: YELLOW
CREAT SERPL-MCNC: 1 MG/DL (ref 0.5–1.4)
CREAT UR-MCNC: 106 MG/DL (ref 15–325)
DIFFERENTIAL METHOD: ABNORMAL
EOSINOPHIL # BLD AUTO: 0.6 K/UL (ref 0–0.5)
EOSINOPHIL NFR BLD: 6.9 % (ref 0–8)
ERYTHROCYTE [DISTWIDTH] IN BLOOD BY AUTOMATED COUNT: 14.3 % (ref 11.5–14.5)
EST. GFR  (AFRICAN AMERICAN): 58.9 ML/MIN/1.73 M^2
EST. GFR  (NON AFRICAN AMERICAN): 51.1 ML/MIN/1.73 M^2
ESTIMATED AVG GLUCOSE: 140 MG/DL (ref 68–131)
GLUCOSE SERPL-MCNC: 156 MG/DL (ref 70–110)
GLUCOSE UR QL STRIP: NEGATIVE
HBA1C MFR BLD: 6.5 % (ref 4–5.6)
HCT VFR BLD AUTO: 27.2 % (ref 37–48.5)
HDLC SERPL-MCNC: 38 MG/DL (ref 40–75)
HDLC SERPL: 24.7 % (ref 20–50)
HGB BLD-MCNC: 8.5 G/DL (ref 12–16)
HGB UR QL STRIP: NEGATIVE
IMM GRANULOCYTES # BLD AUTO: 0.08 K/UL (ref 0–0.04)
IMM GRANULOCYTES NFR BLD AUTO: 1 % (ref 0–0.5)
IRON SERPL-MCNC: 42 UG/DL (ref 30–160)
KETONES UR QL STRIP: NEGATIVE
LDLC SERPL CALC-MCNC: 92.2 MG/DL (ref 63–159)
LEUKOCYTE ESTERASE UR QL STRIP: NEGATIVE
LYMPHOCYTES # BLD AUTO: 1.4 K/UL (ref 1–4.8)
LYMPHOCYTES NFR BLD: 17.4 % (ref 18–48)
MAGNESIUM SERPL-MCNC: 1.7 MG/DL (ref 1.6–2.6)
MCH RBC QN AUTO: 28.5 PG (ref 27–31)
MCHC RBC AUTO-ENTMCNC: 31.3 G/DL (ref 32–36)
MCV RBC AUTO: 91 FL (ref 82–98)
MICROALBUMIN UR DL<=1MG/L-MCNC: 96 UG/ML
MONOCYTES # BLD AUTO: 0.6 K/UL (ref 0.3–1)
MONOCYTES NFR BLD: 7.4 % (ref 4–15)
NEUTROPHILS # BLD AUTO: 5.5 K/UL (ref 1.8–7.7)
NEUTROPHILS NFR BLD: 66.7 % (ref 38–73)
NITRITE UR QL STRIP: NEGATIVE
NONHDLC SERPL-MCNC: 116 MG/DL
NRBC BLD-RTO: 0 /100 WBC
PH UR STRIP: 5 [PH] (ref 5–8)
PLATELET # BLD AUTO: 277 K/UL (ref 150–450)
PMV BLD AUTO: 9.9 FL (ref 9.2–12.9)
POTASSIUM SERPL-SCNC: 3.8 MMOL/L (ref 3.5–5.1)
PROT SERPL-MCNC: 7.1 G/DL (ref 6–8.4)
PROT UR QL STRIP: NEGATIVE
RBC # BLD AUTO: 2.98 M/UL (ref 4–5.4)
SATURATED IRON: 11 % (ref 20–50)
SODIUM SERPL-SCNC: 139 MMOL/L (ref 136–145)
SP GR UR STRIP: 1.02 (ref 1–1.03)
T3FREE SERPL-MCNC: 2.5 PG/ML (ref 2.3–4.2)
T4 FREE SERPL-MCNC: 1.09 NG/DL (ref 0.71–1.51)
TOTAL IRON BINDING CAPACITY: 374 UG/DL (ref 250–450)
TRANSFERRIN SERPL-MCNC: 253 MG/DL (ref 200–375)
TRIGL SERPL-MCNC: 119 MG/DL (ref 30–150)
TSH SERPL DL<=0.005 MIU/L-ACNC: 2.08 UIU/ML (ref 0.4–4)
URN SPEC COLLECT METH UR: NORMAL
UROBILINOGEN UR STRIP-ACNC: NEGATIVE EU/DL
WBC # BLD AUTO: 8.27 K/UL (ref 3.9–12.7)

## 2021-09-08 PROCEDURE — 84466 ASSAY OF TRANSFERRIN: CPT | Performed by: FAMILY MEDICINE

## 2021-09-08 PROCEDURE — 83036 HEMOGLOBIN GLYCOSYLATED A1C: CPT | Performed by: FAMILY MEDICINE

## 2021-09-08 PROCEDURE — 84481 FREE ASSAY (FT-3): CPT | Performed by: FAMILY MEDICINE

## 2021-09-08 PROCEDURE — 82607 VITAMIN B-12: CPT | Performed by: FAMILY MEDICINE

## 2021-09-08 PROCEDURE — 82306 VITAMIN D 25 HYDROXY: CPT | Performed by: FAMILY MEDICINE

## 2021-09-08 PROCEDURE — 36415 COLL VENOUS BLD VENIPUNCTURE: CPT | Performed by: FAMILY MEDICINE

## 2021-09-08 PROCEDURE — 82728 ASSAY OF FERRITIN: CPT | Performed by: FAMILY MEDICINE

## 2021-09-08 PROCEDURE — 84443 ASSAY THYROID STIM HORMONE: CPT | Performed by: FAMILY MEDICINE

## 2021-09-08 PROCEDURE — 85025 COMPLETE CBC W/AUTO DIFF WBC: CPT | Performed by: FAMILY MEDICINE

## 2021-09-08 PROCEDURE — 83735 ASSAY OF MAGNESIUM: CPT | Performed by: FAMILY MEDICINE

## 2021-09-08 PROCEDURE — 86803 HEPATITIS C AB TEST: CPT | Performed by: FAMILY MEDICINE

## 2021-09-08 PROCEDURE — 82570 ASSAY OF URINE CREATININE: CPT | Performed by: FAMILY MEDICINE

## 2021-09-08 PROCEDURE — 80061 LIPID PANEL: CPT | Performed by: FAMILY MEDICINE

## 2021-09-08 PROCEDURE — 84439 ASSAY OF FREE THYROXINE: CPT | Performed by: FAMILY MEDICINE

## 2021-09-08 PROCEDURE — 87389 HIV-1 AG W/HIV-1&-2 AB AG IA: CPT | Performed by: FAMILY MEDICINE

## 2021-09-08 PROCEDURE — 80053 COMPREHEN METABOLIC PANEL: CPT | Performed by: FAMILY MEDICINE

## 2021-09-08 PROCEDURE — 81003 URINALYSIS AUTO W/O SCOPE: CPT | Performed by: FAMILY MEDICINE

## 2021-09-09 LAB
25(OH)D3+25(OH)D2 SERPL-MCNC: 60 NG/ML (ref 30–96)
FERRITIN SERPL-MCNC: 211 NG/ML (ref 20–300)
HCV AB SERPL QL IA: NEGATIVE
HIV 1+2 AB+HIV1 P24 AG SERPL QL IA: NEGATIVE
VIT B12 SERPL-MCNC: 558 PG/ML (ref 210–950)

## 2021-09-13 ENCOUNTER — OFFICE VISIT (OUTPATIENT)
Dept: PODIATRY | Facility: CLINIC | Age: 86
End: 2021-09-13
Payer: MEDICARE

## 2021-09-13 VITALS
DIASTOLIC BLOOD PRESSURE: 67 MMHG | BODY MASS INDEX: 26.93 KG/M2 | TEMPERATURE: 97 F | HEART RATE: 73 BPM | HEIGHT: 63 IN | SYSTOLIC BLOOD PRESSURE: 143 MMHG | WEIGHT: 152 LBS

## 2021-09-13 DIAGNOSIS — L97.511 SKIN ULCER OF RIGHT FOOT, LIMITED TO BREAKDOWN OF SKIN: ICD-10-CM

## 2021-09-13 DIAGNOSIS — E11.49 TYPE II DIABETES MELLITUS WITH NEUROLOGICAL MANIFESTATIONS: Primary | ICD-10-CM

## 2021-09-13 DIAGNOSIS — E11.9 COMPREHENSIVE DIABETIC FOOT EXAMINATION, TYPE 2 DM, ENCOUNTER FOR: ICD-10-CM

## 2021-09-13 DIAGNOSIS — L60.0 INGROWN NAIL: ICD-10-CM

## 2021-09-13 PROCEDURE — 99213 PR OFFICE/OUTPT VISIT, EST, LEVL III, 20-29 MIN: ICD-10-PCS | Mod: S$PBB,,, | Performed by: PODIATRIST

## 2021-09-13 PROCEDURE — 99999 PR PBB SHADOW E&M-EST. PATIENT-LVL III: CPT | Mod: PBBFAC,,, | Performed by: PODIATRIST

## 2021-09-13 PROCEDURE — 99213 OFFICE O/P EST LOW 20 MIN: CPT | Mod: S$PBB,,, | Performed by: PODIATRIST

## 2021-09-13 PROCEDURE — 99999 PR PBB SHADOW E&M-EST. PATIENT-LVL III: ICD-10-PCS | Mod: PBBFAC,,, | Performed by: PODIATRIST

## 2021-09-13 PROCEDURE — 99213 OFFICE O/P EST LOW 20 MIN: CPT | Mod: PBBFAC | Performed by: PODIATRIST

## 2021-09-13 RX ORDER — AMITRIPTYLINE HYDROCHLORIDE 25 MG/1
25 TABLET, FILM COATED ORAL NIGHTLY
COMMUNITY
Start: 2021-08-20 | End: 2021-09-16

## 2021-09-16 ENCOUNTER — OFFICE VISIT (OUTPATIENT)
Dept: FAMILY MEDICINE | Facility: CLINIC | Age: 86
End: 2021-09-16
Payer: MEDICARE

## 2021-09-16 VITALS
HEART RATE: 83 BPM | OXYGEN SATURATION: 99 % | SYSTOLIC BLOOD PRESSURE: 130 MMHG | TEMPERATURE: 99 F | WEIGHT: 172.81 LBS | HEIGHT: 63 IN | BODY MASS INDEX: 30.62 KG/M2 | DIASTOLIC BLOOD PRESSURE: 60 MMHG

## 2021-09-16 DIAGNOSIS — E11.49 TYPE II DIABETES MELLITUS WITH NEUROLOGICAL MANIFESTATIONS: ICD-10-CM

## 2021-09-16 DIAGNOSIS — G47.9 SLEEP DISORDER: ICD-10-CM

## 2021-09-16 DIAGNOSIS — S01.01XD LACERATION OF SCALP, SUBSEQUENT ENCOUNTER: Primary | ICD-10-CM
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
## 2021-09-16 DIAGNOSIS — D64.9 ANEMIA, UNSPECIFIED TYPE: ICD-10-CM

## 2021-09-16 DIAGNOSIS — S09.8XXS: ICD-10-CM

## 2021-09-16 DIAGNOSIS — R26.81 UNSTEADY GAIT: ICD-10-CM

## 2021-09-16 DIAGNOSIS — E53.8 LOW SERUM VITAMIN B12: ICD-10-CM
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
## 2021-09-16 DIAGNOSIS — F43.29 GRIEF REACTION WITH PROLONGED BEREAVEMENT: ICD-10-CM

## 2021-09-16 PROCEDURE — 99215 OFFICE O/P EST HI 40 MIN: CPT | Mod: S$PBB,,, | Performed by: FAMILY MEDICINE

## 2021-09-16 PROCEDURE — 99999 PR PBB SHADOW E&M-EST. PATIENT-LVL IV: ICD-10-PCS | Mod: PBBFAC,,, | Performed by: FAMILY MEDICINE

## 2021-09-16 PROCEDURE — 96372 THER/PROPH/DIAG INJ SC/IM: CPT | Mod: PBBFAC,PN

## 2021-09-16 PROCEDURE — 99999 PR PBB SHADOW E&M-EST. PATIENT-LVL IV: CPT | Mod: PBBFAC,,, | Performed by: FAMILY MEDICINE

## 2021-09-16 PROCEDURE — 99214 OFFICE O/P EST MOD 30 MIN: CPT | Mod: PBBFAC,PN,25 | Performed by: FAMILY MEDICINE

## 2021-09-16 PROCEDURE — 99215 PR OFFICE/OUTPT VISIT, EST, LEVL V, 40-54 MIN: ICD-10-PCS | Mod: S$PBB,,, | Performed by: FAMILY MEDICINE

## 2021-09-16 RX ORDER — CYANOCOBALAMIN 1000 UG/ML
1000 INJECTION, SOLUTION INTRAMUSCULAR; SUBCUTANEOUS ONCE
Status: COMPLETED | OUTPATIENT
Start: 2021-09-16 | End: 2021-09-16

## 2021-09-16 RX ORDER — AMITRIPTYLINE HYDROCHLORIDE 10 MG/1
10 TABLET, FILM COATED ORAL NIGHTLY PRN
Qty: 30 TABLET | Refills: 0 | Status: SHIPPED | OUTPATIENT
Start: 2021-09-16 | End: 2022-03-17

## 2021-09-16 RX ADMIN — CYANOCOBALAMIN 1000 MCG: 1000 INJECTION INTRAMUSCULAR; SUBCUTANEOUS at 09:09

## 2021-09-18 PROBLEM — E11.9 COMPREHENSIVE DIABETIC FOOT EXAMINATION, TYPE 2 DM, ENCOUNTER FOR: Status: ACTIVE | Noted: 2021-09-18

## 2021-10-12 ENCOUNTER — TELEPHONE (OUTPATIENT)
Dept: PODIATRY | Facility: CLINIC | Age: 86
End: 2021-10-12

## 2021-10-12 DIAGNOSIS — E11.49 TYPE II DIABETES MELLITUS WITH NEUROLOGICAL MANIFESTATIONS: Primary | ICD-10-CM

## 2021-10-12 DIAGNOSIS — L60.0 INGROWN NAIL: ICD-10-CM

## 2021-10-26 ENCOUNTER — OFFICE VISIT (OUTPATIENT)
Dept: FAMILY MEDICINE | Facility: CLINIC | Age: 86
End: 2021-10-26
Payer: MEDICARE
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
## 2021-10-26 DIAGNOSIS — D64.9 ANEMIA, UNSPECIFIED TYPE: ICD-10-CM

## 2021-10-26 DIAGNOSIS — S01.01XD LACERATION OF SCALP, SUBSEQUENT ENCOUNTER: ICD-10-CM
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
## 2021-10-26 PROCEDURE — 99212 PR OFFICE/OUTPT VISIT, EST, LEVL II, 10-19 MIN: ICD-10-PCS | Mod: $0,95,, | Performed by: FAMILY MEDICINE

## 2021-10-26 PROCEDURE — 99212 OFFICE O/P EST SF 10 MIN: CPT | Mod: $0,95,, | Performed by: FAMILY MEDICINE

## 2022-03-09 ENCOUNTER — OFFICE VISIT (OUTPATIENT)
Dept: PODIATRY | Facility: CLINIC | Age: 87
End: 2022-03-09
Payer: MEDICARE

## 2022-03-09 VITALS
DIASTOLIC BLOOD PRESSURE: 70 MMHG | SYSTOLIC BLOOD PRESSURE: 122 MMHG | RESPIRATION RATE: 16 BRPM | HEIGHT: 63 IN | HEART RATE: 86 BPM | WEIGHT: 172 LBS | OXYGEN SATURATION: 98 % | BODY MASS INDEX: 30.48 KG/M2

## 2022-03-09 DIAGNOSIS — E11.9 COMPREHENSIVE DIABETIC FOOT EXAMINATION, TYPE 2 DM, ENCOUNTER FOR: Primary | ICD-10-CM

## 2022-03-09 DIAGNOSIS — I73.9 PVD (PERIPHERAL VASCULAR DISEASE): ICD-10-CM

## 2022-03-09 DIAGNOSIS — E11.49 TYPE II DIABETES MELLITUS WITH NEUROLOGICAL MANIFESTATIONS: ICD-10-CM

## 2022-03-09 DIAGNOSIS — B35.3 TINEA PEDIS OF RIGHT FOOT: ICD-10-CM

## 2022-03-09 PROCEDURE — 99999 PR PBB SHADOW E&M-EST. PATIENT-LVL IV: ICD-10-PCS | Mod: PBBFAC,,, | Performed by: PODIATRIST

## 2022-03-09 PROCEDURE — 99213 PR OFFICE/OUTPT VISIT, EST, LEVL III, 20-29 MIN: ICD-10-PCS | Mod: S$PBB,,, | Performed by: PODIATRIST

## 2022-03-09 PROCEDURE — 99213 OFFICE O/P EST LOW 20 MIN: CPT | Mod: S$PBB,,, | Performed by: PODIATRIST

## 2022-03-09 PROCEDURE — 99214 OFFICE O/P EST MOD 30 MIN: CPT | Mod: PBBFAC,PN | Performed by: PODIATRIST

## 2022-03-09 PROCEDURE — 99999 PR PBB SHADOW E&M-EST. PATIENT-LVL IV: CPT | Mod: PBBFAC,,, | Performed by: PODIATRIST

## 2022-03-09 RX ORDER — ESCITALOPRAM OXALATE 10 MG/1
10 TABLET ORAL DAILY
COMMUNITY
Start: 2022-01-05 | End: 2022-03-17 | Stop reason: SDUPTHER

## 2022-03-13 NOTE — PROGRESS NOTES
Subjective:       Patient ID: Cinthya Cuevas is a 87 y.o. female.    Chief Complaint: Diabetic Foot Exam  Referred by Dr Martin, long-time patient of Dr. Theo Urias presents today for diabetic foot exam.  She just returned home after staying with her son for about 5 months in [place].  Relates diabetes has been well controlled, reports glucose 132 this morning.  She has been having some problems 4th webspace right foot and presents with a tube of medication she was instructed to start applying in this location, medication she is using is diclofenac gel.  States it has not helped, has gotten worse.  Pain level /10    Past Medical History:   Diagnosis Date    Anemia     Arthritis     Diabetes mellitus     Encounter for blood transfusion     Impaired mobility      Past Surgical History:   Procedure Laterality Date    APPENDECTOMY      BACK SURGERY      3/2015    CHOLECYSTECTOMY      COLONOSCOPY      EYE SURGERY Bilateral     cataract    FRACTURE SURGERY      right ankle     HYSTERECTOMY      JOINT REPLACEMENT      left hip, right knee     History reviewed. No pertinent family history.  Social History     Socioeconomic History    Marital status:    Tobacco Use    Smoking status: Former Smoker     Packs/day: 0.25     Years: 31.00     Pack years: 7.75     Start date: 1957     Quit date: 1988     Years since quittin.2    Smokeless tobacco: Never Used   Substance and Sexual Activity    Alcohol use: No    Drug use: No       Current Outpatient Medications   Medication Sig Dispense Refill    amitriptyline (ELAVIL) 10 MG tablet Take 1 tablet (10 mg total) by mouth nightly as needed for Insomnia. Use this to wean off the amitriptyline as instructed. Take every other night for a week, then take every third night for a week, then stop. 30 tablet 0    aspirin (ECOTRIN) 81 MG EC tablet Take 81 mg by mouth once a week.      atorvastatin (LIPITOR) 40 MG tablet Take 1 tablet  "(40 mg total) by mouth every evening. For cholesterol and diabetes 90 tablet 3    EScitalopram oxalate (LEXAPRO) 10 MG tablet Take 10 mg by mouth once daily.      FREESTYLE INSULINX Misc       FREESTYLE LANCETS 28 gauge lancets       FREESTYLE LITE STRIPS Strp       metFORMIN (GLUCOPHAGE-XR) 500 MG ER 24hr tablet Take 1 tablet (500 mg total) by mouth every evening. 90 tablet 3    SITagliptin (JANUVIA) 100 MG Tab Take 1 tablet (100 mg total) by mouth once daily. 90 tablet 3    traZODone (DESYREL) 50 MG tablet Take 1 tablet (50 mg total) by mouth nightly as needed for Insomnia. 90 tablet 3     No current facility-administered medications for this visit.     Review of patient's allergies indicates:   Allergen Reactions    Cefaclor     Piroxicam Hives       Review of Systems   HENT: Negative for congestion.    Respiratory: Negative for cough and shortness of breath.    Cardiovascular: Positive for leg swelling.   Musculoskeletal: Positive for gait problem.   All other systems reviewed and are negative.      Objective:      Vitals:    03/09/22 1154   BP: 122/70   Pulse: 86   Resp: 16   SpO2: 98%   Weight: 78 kg (172 lb)   Height: 5' 3" (1.6 m)     Physical Exam  Vitals and nursing note reviewed.   Constitutional:       General: She is not in acute distress.     Appearance: Normal appearance.   Cardiovascular:      Pulses:           Dorsalis pedis pulses are 1+ on the right side and 1+ on the left side.        Posterior tibial pulses are 0 on the right side and 0 on the left side.   Pulmonary:      Effort: Pulmonary effort is normal.   Musculoskeletal:      Right foot: Decreased range of motion (Arthritic and degenerative changes bilateral feet).      Left foot: Decreased range of motion.   Feet:      Right foot:      Skin integrity: Erythema (There is mild irritation of the 4th webspace, light erythema with maceration due to interdigital tinea.  No skin break, no calor or evidence of infection) present.      " Toenail Condition: Right toenails are abnormally thick and long. Fungal disease present.     Left foot:      Skin integrity: No erythema.      Toenail Condition: Left toenails are abnormally thick and long. Fungal disease present.  Skin:     Capillary Refill: Capillary refill takes 2 to 3 seconds.   Neurological:      General: No focal deficit present.      Mental Status: She is alert.   Psychiatric:         Mood and Affect: Mood normal.         Contains abnormal data Hemoglobin A1C    Component Ref Range & Units 6 mo ago    Hemoglobin A1C 4.0 - 5.6 % 6.5 High        Estimated Avg Glucose 68 - 131 mg/dL 140 High                 Assessment:       1. Comprehensive diabetic foot examination, type 2 DM, encounter for    2. Type II diabetes mellitus with neurological manifestations    3. PVD (peripheral vascular disease)    4. Tinea pedis of right foot        Plan:           Diabetic pedal exam performed.    Reviewed diabetic education, benefit of tight(er) control of glucose/diabetes   Reviewed wide, light appropriate shoes,  especially indoors to protect feet, avoid flat shoes, slippers or walking in sock or bare feet.    Advised patient medication/tube of gel she presents with today is for arthritis, can be used as directed for arthritis pain foot or ankle joint, do not use between the toes, discontinue this medication immediately to the 4th webspace right foot.  Advised patient it most likely did further area take the skin in this location.  Instructed patient to keep the area clean and dry.  Area was cleansed with wound  today, iodine applied and dispensed samples to patient to apply iodine once daily.  We discussed using baby powder between the toes to reduce friction and absorb moisture, light soft padding to keep the toe .  No ointment or wrapping of the toes.  Keep the area air it out, clean and dry.  We did review soaking  Advised if area is irritated due to moist skin, this should resolve in  less than a week with application of iodine.  Reviewed signs of infection to monitor for, contact the office immediately if there are any changes to the area concern for infection  Discussed maintenance of skin and nails, nails debrided.  No other areas of concern or complication at this time    Reviewed need for daily foot checks and instructed patient to contact the office with any area of redness or swelling which has not improved within 3 days.  Patient was in understanding and agreement with treatment plan.  I counseled the patient on their conditions, implications and medical management.  Instructed patient to contact the office with any changes, questions, concerns, worsening of symptoms.   Total face to face time, exam, assessment, treatment, discussion, documentation 20 minutes, more than half this time spent on consultation and coordination of care.   Follow up 2 months Dr Les Urias      This note was created using [employer] voice recognition software that occasionally misinterpreted phrases or words.

## 2022-03-15 LAB
LEFT EYE DM RETINOPATHY: NEGATIVE
RIGHT EYE DM RETINOPATHY: NEGATIVE

## 2022-03-17 ENCOUNTER — OFFICE VISIT (OUTPATIENT)
Dept: FAMILY MEDICINE | Facility: CLINIC | Age: 87
End: 2022-03-17
Payer: MEDICARE

## 2022-03-17 ENCOUNTER — LAB VISIT (OUTPATIENT)
Dept: FAMILY MEDICINE | Facility: CLINIC | Age: 87
End: 2022-03-17
Payer: MEDICARE

## 2022-03-17 VITALS
OXYGEN SATURATION: 98 % | HEART RATE: 80 BPM | SYSTOLIC BLOOD PRESSURE: 136 MMHG | TEMPERATURE: 99 F | DIASTOLIC BLOOD PRESSURE: 63 MMHG

## 2022-03-17 DIAGNOSIS — D64.9 ANEMIA, UNSPECIFIED TYPE: ICD-10-CM

## 2022-03-17 DIAGNOSIS — E11.49 TYPE II DIABETES MELLITUS WITH NEUROLOGICAL MANIFESTATIONS: ICD-10-CM

## 2022-03-17 DIAGNOSIS — G47.9 SLEEP DISORDER: ICD-10-CM

## 2022-03-17 DIAGNOSIS — M25.561 CHRONIC PAIN OF BOTH KNEES: ICD-10-CM

## 2022-03-17 DIAGNOSIS — F43.29 GRIEF REACTION WITH PROLONGED BEREAVEMENT: Primary | ICD-10-CM

## 2022-03-17 DIAGNOSIS — R53.1 WEAKNESS GENERALIZED: ICD-10-CM

## 2022-03-17 DIAGNOSIS — E53.8 LOW SERUM VITAMIN B12: ICD-10-CM

## 2022-03-17 DIAGNOSIS — M25.562 CHRONIC PAIN OF BOTH KNEES: ICD-10-CM

## 2022-03-17 DIAGNOSIS — G89.29 CHRONIC PAIN OF BOTH KNEES: ICD-10-CM

## 2022-03-17 DIAGNOSIS — Z91.81 AT HIGH RISK FOR INJURY RELATED TO FALL: ICD-10-CM

## 2022-03-17 DIAGNOSIS — F43.29 GRIEF REACTION WITH PROLONGED BEREAVEMENT: ICD-10-CM

## 2022-03-17 LAB
ALBUMIN SERPL BCP-MCNC: 3.7 G/DL (ref 3.5–5.2)
ALP SERPL-CCNC: 77 U/L (ref 55–135)
ALT SERPL W/O P-5'-P-CCNC: 19 U/L (ref 10–44)
ANION GAP SERPL CALC-SCNC: 11 MMOL/L (ref 8–16)
AST SERPL-CCNC: 16 U/L (ref 10–40)
BASOPHILS # BLD AUTO: 0.03 K/UL (ref 0–0.2)
BASOPHILS NFR BLD: 0.6 % (ref 0–1.9)
BILIRUB SERPL-MCNC: 0.4 MG/DL (ref 0.1–1)
BUN SERPL-MCNC: 27 MG/DL (ref 8–23)
CALCIUM SERPL-MCNC: 10 MG/DL (ref 8.7–10.5)
CHLORIDE SERPL-SCNC: 102 MMOL/L (ref 95–110)
CO2 SERPL-SCNC: 26 MMOL/L (ref 23–29)
CREAT SERPL-MCNC: 1.1 MG/DL (ref 0.5–1.4)
DIFFERENTIAL METHOD: ABNORMAL
EOSINOPHIL # BLD AUTO: 0.2 K/UL (ref 0–0.5)
EOSINOPHIL NFR BLD: 2.8 % (ref 0–8)
ERYTHROCYTE [DISTWIDTH] IN BLOOD BY AUTOMATED COUNT: 14.9 % (ref 11.5–14.5)
EST. GFR  (AFRICAN AMERICAN): 52.2 ML/MIN/1.73 M^2
EST. GFR  (NON AFRICAN AMERICAN): 45.3 ML/MIN/1.73 M^2
ESTIMATED AVG GLUCOSE: 128 MG/DL (ref 68–131)
GLUCOSE SERPL-MCNC: 203 MG/DL (ref 70–110)
HBA1C MFR BLD: 6.1 % (ref 4–5.6)
HCT VFR BLD AUTO: 28.8 % (ref 37–48.5)
HGB BLD-MCNC: 9.2 G/DL (ref 12–16)
IMM GRANULOCYTES # BLD AUTO: 0.01 K/UL (ref 0–0.04)
IMM GRANULOCYTES NFR BLD AUTO: 0.2 % (ref 0–0.5)
IRON SERPL-MCNC: 52 UG/DL (ref 30–160)
LYMPHOCYTES # BLD AUTO: 1.1 K/UL (ref 1–4.8)
LYMPHOCYTES NFR BLD: 21 % (ref 18–48)
MCH RBC QN AUTO: 28 PG (ref 27–31)
MCHC RBC AUTO-ENTMCNC: 31.9 G/DL (ref 32–36)
MCV RBC AUTO: 88 FL (ref 82–98)
MONOCYTES # BLD AUTO: 0.4 K/UL (ref 0.3–1)
MONOCYTES NFR BLD: 7 % (ref 4–15)
NEUTROPHILS # BLD AUTO: 3.6 K/UL (ref 1.8–7.7)
NEUTROPHILS NFR BLD: 68.4 % (ref 38–73)
NRBC BLD-RTO: 0 /100 WBC
PLATELET # BLD AUTO: 218 K/UL (ref 150–450)
PMV BLD AUTO: 10.7 FL (ref 9.2–12.9)
POTASSIUM SERPL-SCNC: 4 MMOL/L (ref 3.5–5.1)
PROT SERPL-MCNC: 7.3 G/DL (ref 6–8.4)
RBC # BLD AUTO: 3.28 M/UL (ref 4–5.4)
SATURATED IRON: 12 % (ref 20–50)
SODIUM SERPL-SCNC: 139 MMOL/L (ref 136–145)
TOTAL IRON BINDING CAPACITY: 443 UG/DL (ref 250–450)
TRANSFERRIN SERPL-MCNC: 299 MG/DL (ref 200–375)
VIT B12 SERPL-MCNC: 582 PG/ML (ref 210–950)
WBC # BLD AUTO: 5.29 K/UL (ref 3.9–12.7)

## 2022-03-17 PROCEDURE — 99213 OFFICE O/P EST LOW 20 MIN: CPT | Mod: PBBFAC,PN | Performed by: FAMILY MEDICINE

## 2022-03-17 PROCEDURE — 85025 COMPLETE CBC W/AUTO DIFF WBC: CPT | Performed by: FAMILY MEDICINE

## 2022-03-17 PROCEDURE — 99999 PR PBB SHADOW E&M-EST. PATIENT-LVL III: ICD-10-PCS | Mod: PBBFAC,,, | Performed by: FAMILY MEDICINE

## 2022-03-17 PROCEDURE — 80053 COMPREHEN METABOLIC PANEL: CPT | Performed by: FAMILY MEDICINE

## 2022-03-17 PROCEDURE — 83036 HEMOGLOBIN GLYCOSYLATED A1C: CPT | Performed by: FAMILY MEDICINE

## 2022-03-17 PROCEDURE — 84466 ASSAY OF TRANSFERRIN: CPT | Performed by: FAMILY MEDICINE

## 2022-03-17 PROCEDURE — 99999 PR PBB SHADOW E&M-EST. PATIENT-LVL III: CPT | Mod: PBBFAC,,, | Performed by: FAMILY MEDICINE

## 2022-03-17 PROCEDURE — 82728 ASSAY OF FERRITIN: CPT | Performed by: FAMILY MEDICINE

## 2022-03-17 PROCEDURE — 82607 VITAMIN B-12: CPT | Performed by: FAMILY MEDICINE

## 2022-03-17 PROCEDURE — 99215 PR OFFICE/OUTPT VISIT, EST, LEVL V, 40-54 MIN: ICD-10-PCS | Mod: S$PBB,,, | Performed by: FAMILY MEDICINE

## 2022-03-17 PROCEDURE — 99215 OFFICE O/P EST HI 40 MIN: CPT | Mod: S$PBB,,, | Performed by: FAMILY MEDICINE

## 2022-03-17 RX ORDER — ESCITALOPRAM OXALATE 10 MG/1
10 TABLET ORAL DAILY
Qty: 90 TABLET | Refills: 3 | Status: SHIPPED | OUTPATIENT
Start: 2022-03-17 | End: 2022-05-11 | Stop reason: SDUPTHER

## 2022-03-17 RX ORDER — TRAZODONE HYDROCHLORIDE 50 MG/1
50 TABLET ORAL NIGHTLY PRN
Qty: 90 TABLET | Refills: 3 | Status: SHIPPED | OUTPATIENT
Start: 2022-03-17 | End: 2022-06-17 | Stop reason: SDUPTHER

## 2022-03-17 RX ORDER — METFORMIN HYDROCHLORIDE 500 MG/1
500 TABLET, EXTENDED RELEASE ORAL NIGHTLY
Qty: 90 TABLET | Refills: 3 | Status: SHIPPED | OUTPATIENT
Start: 2022-03-17 | End: 2022-06-17 | Stop reason: SDUPTHER

## 2022-03-18 ENCOUNTER — PATIENT OUTREACH (OUTPATIENT)
Dept: ADMINISTRATIVE | Facility: HOSPITAL | Age: 87
End: 2022-03-18
Payer: MEDICARE

## 2022-03-18 LAB — FERRITIN SERPL-MCNC: 126 NG/ML (ref 20–300)

## 2022-03-18 NOTE — LETTER
AUTHORIZATION FOR RELEASE OF   CONFIDENTIAL INFORMATION        [facility]    We are seeing Cinthya Cuevas, date of birth 1935, in the clinic at [facility]. Dariana Martin MD is the patient's PCP. Cinthya Cuevas has an outstanding lab/procedure at the time we reviewed their chart. In order to help keep their health information updated, Cinthya Cuevas has authorized us to request the following medical record(s):        ( X )  DIABETIC EYE EXAM (WITHIN 48 MONTHS)              Please fax records to [facility]  [phone]      KARLA BROWN LPN CLINICAL CARE COORDINATOR   [facility] [address] [place] [address]  PHONE: [phone]  FAX: [phone]            Patient Name: Cinthya Cuevas  : 1935  Patient Phone #: [phone]

## 2022-03-18 NOTE — PROGRESS NOTES
Population Health Outreach.    Requested Diabetic Eye Exam from [facility]

## 2022-03-30 DIAGNOSIS — E53.8 DEFICIENCY OF OTHER SPECIFIED B GROUP VITAMINS: ICD-10-CM

## 2022-03-30 DIAGNOSIS — D64.9 ANEMIA, UNSPECIFIED TYPE: Primary | ICD-10-CM

## 2022-03-30 DIAGNOSIS — E11.49 TYPE II DIABETES MELLITUS WITH NEUROLOGICAL MANIFESTATIONS: ICD-10-CM

## 2022-04-12 ENCOUNTER — TELEPHONE (OUTPATIENT)
Dept: FAMILY MEDICINE | Facility: CLINIC | Age: 87
End: 2022-04-12
Payer: MEDICARE

## 2022-04-12 RX ORDER — ESCITALOPRAM OXALATE 20 MG/1
20 TABLET ORAL DAILY
Qty: 90 TABLET | Refills: 3 | Status: SHIPPED | OUTPATIENT
Start: 2022-04-12 | End: 2022-06-17

## 2022-04-12 NOTE — TELEPHONE ENCOUNTER
Fill out everything except my part on the handicap form, leave on my desk.    Increase Lexapro to 15mg (one nubia half tablets) for a couple of weeks, then to 20mg (I will send in new dose with updated prescription.)    Make sure she has follow up--looks like June 17. That will work just fine.

## 2022-04-12 NOTE — TELEPHONE ENCOUNTER
Patient stopped by the office to have a handicap form filled out and also to inquire about increasing the Lexapro.She is currently taking 10mg daily. Please review and advise.

## 2022-04-13 ENCOUNTER — TELEPHONE (OUTPATIENT)
Dept: FAMILY MEDICINE | Facility: CLINIC | Age: 87
End: 2022-04-13
Payer: MEDICARE

## 2022-04-18 ENCOUNTER — PATIENT OUTREACH (OUTPATIENT)
Dept: ADMINISTRATIVE | Facility: HOSPITAL | Age: 87
End: 2022-04-18
Payer: MEDICARE

## 2022-05-11 ENCOUNTER — OFFICE VISIT (OUTPATIENT)
Dept: PODIATRY | Facility: CLINIC | Age: 87
End: 2022-05-11
Payer: MEDICARE

## 2022-05-11 VITALS
HEART RATE: 79 BPM | HEIGHT: 63 IN | WEIGHT: 143 LBS | BODY MASS INDEX: 25.34 KG/M2 | SYSTOLIC BLOOD PRESSURE: 158 MMHG | RESPIRATION RATE: 18 BRPM | DIASTOLIC BLOOD PRESSURE: 59 MMHG

## 2022-05-11 DIAGNOSIS — L60.0 INGROWN NAIL: ICD-10-CM

## 2022-05-11 DIAGNOSIS — R26.81 UNSTEADY GAIT: ICD-10-CM

## 2022-05-11 DIAGNOSIS — M20.42 HAMMER TOES OF BOTH FEET: ICD-10-CM

## 2022-05-11 DIAGNOSIS — E11.49 TYPE II DIABETES MELLITUS WITH NEUROLOGICAL MANIFESTATIONS: Primary | ICD-10-CM

## 2022-05-11 DIAGNOSIS — L97.511 SKIN ULCER OF RIGHT FOOT, LIMITED TO BREAKDOWN OF SKIN: ICD-10-CM

## 2022-05-11 DIAGNOSIS — E11.9 COMPREHENSIVE DIABETIC FOOT EXAMINATION, TYPE 2 DM, ENCOUNTER FOR: ICD-10-CM

## 2022-05-11 DIAGNOSIS — M20.41 HAMMER TOES OF BOTH FEET: ICD-10-CM

## 2022-05-11 PROCEDURE — 99214 OFFICE O/P EST MOD 30 MIN: CPT | Mod: PBBFAC | Performed by: PODIATRIST

## 2022-05-11 PROCEDURE — 99999 PR PBB SHADOW E&M-EST. PATIENT-LVL IV: ICD-10-PCS | Mod: PBBFAC,,, | Performed by: PODIATRIST

## 2022-05-11 PROCEDURE — 99214 OFFICE O/P EST MOD 30 MIN: CPT | Mod: S$PBB,,, | Performed by: PODIATRIST

## 2022-05-11 PROCEDURE — 99214 PR OFFICE/OUTPT VISIT, EST, LEVL IV, 30-39 MIN: ICD-10-PCS | Mod: S$PBB,,, | Performed by: PODIATRIST

## 2022-05-11 PROCEDURE — 99999 PR PBB SHADOW E&M-EST. PATIENT-LVL IV: CPT | Mod: PBBFAC,,, | Performed by: PODIATRIST

## 2022-05-11 RX ORDER — DICLOFENAC SODIUM 10 MG/G
2 GEL TOPICAL 4 TIMES DAILY
Qty: 100 G | Refills: 2 | Status: SHIPPED | OUTPATIENT
Start: 2022-05-11 | End: 2022-06-17 | Stop reason: SDUPTHER

## 2022-05-11 RX ORDER — ESCITALOPRAM OXALATE 10 MG/1
1 TABLET ORAL DAILY
COMMUNITY
Start: 2022-03-17 | End: 2022-06-17 | Stop reason: SDUPTHER

## 2022-05-11 RX ORDER — METFORMIN HYDROCHLORIDE 500 MG/1
1 TABLET, EXTENDED RELEASE ORAL NIGHTLY
COMMUNITY
Start: 2022-03-17 | End: 2022-05-11 | Stop reason: SDUPTHER

## 2022-05-11 RX ORDER — AMITRIPTYLINE HYDROCHLORIDE 10 MG/1
TABLET, FILM COATED ORAL
COMMUNITY
Start: 2021-09-16 | End: 2022-06-17

## 2022-05-14 PROBLEM — M20.41 HAMMER TOES OF BOTH FEET: Status: ACTIVE | Noted: 2022-05-14

## 2022-05-14 PROBLEM — L02.619 CELLULITIS AND ABSCESS OF FOOT: Status: RESOLVED | Noted: 2019-03-06 | Resolved: 2022-05-14

## 2022-05-14 PROBLEM — L03.119 CELLULITIS AND ABSCESS OF FOOT: Status: RESOLVED | Noted: 2019-03-06 | Resolved: 2022-05-14

## 2022-05-14 PROBLEM — M20.42 HAMMER TOES OF BOTH FEET: Status: ACTIVE | Noted: 2022-05-14

## 2022-05-15 NOTE — PROGRESS NOTES
Subjective:       Patient ID: Cinthya Cuevas is a 87 y.o. female.    Chief Complaint: Toe Pain, Foot Pain, Foot Swelling, Diabetes Mellitus, and Callouses   Patient presents today for diabetic evaluation.      Follow-up  Associated symptoms include arthralgias, joint swelling and numbness.   Foot Pain  Associated symptoms include arthralgias, joint swelling and numbness.   Nail Problem  Associated symptoms include arthralgias, joint swelling and numbness.   Ingrown Toenail  Associated symptoms include arthralgias, joint swelling and numbness.   Foot Injury   Associated symptoms include numbness.   Toe Pain   Associated symptoms include numbness.     Review of Systems   Musculoskeletal: Positive for arthralgias, gait problem and joint swelling.   Neurological: Positive for numbness.   All other systems reviewed and are negative.      Objective:      Physical Exam  Vitals and nursing note reviewed.   Constitutional:       Appearance: She is well-developed.   Cardiovascular:      Pulses:           Dorsalis pedis pulses are 1+ on the right side and 1+ on the left side.        Posterior tibial pulses are 0 on the right side and 0 on the left side.   Musculoskeletal:         General: Tenderness and deformity present.      Right foot: Deformity present.      Left foot: Deformity present.   Feet:      Right foot:      Protective Sensation: 4 sites tested. 1 site sensed.     Skin integrity: Callus and dry skin present.      Left foot:      Protective Sensation: 4 sites tested. 1 site sensed.     Skin integrity: Callus and dry skin present.   Skin:     Capillary Refill: Capillary refill takes more than 3 seconds.   Neurological:      Deep Tendon Reflexes: Reflexes abnormal.   Psychiatric:         Behavior: Behavior normal.         Thought Content: Thought content normal.         Judgment: Judgment normal.                                                                                               Assessment:       1.  Type II diabetes mellitus with neurological manifestations    2. Comprehensive diabetic foot examination, type 2 DM, encounter for    3. Ingrown nail    4. Unsteady gait    5. Hammer toes of both feet    6. Skin ulcer of right foot, limited to breakdown of skin        Plan:       Patient presents today for follow-up diabetic evaluation complete.  Patient has chronically ingrown toenails on both feet she has had to have these treated in trimmed for years she also has pre ulcerative hyperkeratotic lesions primarily at the distal aspect of the 2nd digit right secondary to contracture and sub 1st MPJ right these get a crease equally painful uncomfortable these have been infected before.  Patient continues to have severe balance problem she states she is awaiting the results of an MRI on her shoulder and she has been referred to physical therapy to help with strengthening.  Patient is currently using a walker all the time she states she still having problems with falling even while using a walker.  Following a complete diabetic evaluation the hyperkeratotic lesions were non excisionally debrided the ingrown toenails were debrided I plan to follow up with the patient in 10 weeks.  Patient is come in for an early appointment much earlier than expected she still been experiencing pain at the tip of the 2nd digit right foot patient does have a pre ulcerative hyperkeratotic lesion this was trimmed on her last visit however she states it has been still very painful sore I did aggressively non excisionally debride the area and removed additional nail bacitracin ointment and a light dressing applied patient advised to leave this on here for the next 24 hr she did notice immediate relief following debridement.  Patient had previously moved to floor to however she has returned back to the area and moved into an assisted living facility.  Patient does have some degenerative changes overlying the 5th metatarsal head on the patient's  left foot I have recommended the application of Voltaren gel to this area 3 times a day.  Patient also has an ulceration in the 4th webspace right which is positive for fungal involvement I have recommended the application of Betadine twice a day every day x7 days then reducing it to once a day for an additional 7 days patient was also dispensed Silipos toe spacer to place between the 4th and 5th digits on the right foot to keep them  and to prevent them from rubbing.  Multiple ingrown toenails and hyperkeratotic lesions debrided today patient was advised if these areas are not doing better over the next several weeks to contact us for follow-up sooner than her normally scheduled 2-3 month appointment.  This note was created using [employer] voice recognition software that occasionally misinterpreted phrases or words.

## 2022-05-31 NOTE — TELEPHONE ENCOUNTER
Patient notified and verbalized understanding. Informed her we will call her with the disabled parking karen was completed for .   Calcipotriene Pregnancy And Lactation Text: This medication has not been proven safe during pregnancy. It is unknown if this medication is excreted in breast milk.

## 2022-06-17 ENCOUNTER — TELEPHONE (OUTPATIENT)
Dept: FAMILY MEDICINE | Facility: CLINIC | Age: 87
End: 2022-06-17
Payer: MEDICARE

## 2022-06-17 ENCOUNTER — OFFICE VISIT (OUTPATIENT)
Dept: FAMILY MEDICINE | Facility: CLINIC | Age: 87
End: 2022-06-17
Payer: MEDICARE

## 2022-06-17 ENCOUNTER — TELEPHONE (OUTPATIENT)
Dept: FAMILY MEDICINE | Facility: CLINIC | Age: 87
End: 2022-06-17

## 2022-06-17 VITALS
OXYGEN SATURATION: 99 % | DIASTOLIC BLOOD PRESSURE: 74 MMHG | TEMPERATURE: 97 F | HEIGHT: 62 IN | SYSTOLIC BLOOD PRESSURE: 130 MMHG | BODY MASS INDEX: 26.59 KG/M2 | HEART RATE: 73 BPM | WEIGHT: 144.5 LBS

## 2022-06-17 DIAGNOSIS — R53.83 FATIGUE, UNSPECIFIED TYPE: ICD-10-CM

## 2022-06-17 DIAGNOSIS — L97.511 SKIN ULCER OF RIGHT FOOT, LIMITED TO BREAKDOWN OF SKIN: ICD-10-CM

## 2022-06-17 DIAGNOSIS — E53.8 DEFICIENCY OF OTHER SPECIFIED B GROUP VITAMINS: ICD-10-CM

## 2022-06-17 DIAGNOSIS — G47.9 SLEEP DISORDER: ICD-10-CM

## 2022-06-17 DIAGNOSIS — R22.42 LOCALIZED SWELLING OF LEFT FOOT: Primary | ICD-10-CM

## 2022-06-17 DIAGNOSIS — M79.89 LEFT LEG SWELLING: ICD-10-CM

## 2022-06-17 DIAGNOSIS — E11.49 TYPE II DIABETES MELLITUS WITH NEUROLOGICAL MANIFESTATIONS: ICD-10-CM

## 2022-06-17 DIAGNOSIS — G89.29 CHRONIC PAIN OF BOTH KNEES: ICD-10-CM

## 2022-06-17 DIAGNOSIS — M25.561 CHRONIC PAIN OF BOTH KNEES: ICD-10-CM

## 2022-06-17 DIAGNOSIS — M25.562 CHRONIC PAIN OF BOTH KNEES: ICD-10-CM

## 2022-06-17 DIAGNOSIS — F43.29 GRIEF REACTION WITH PROLONGED BEREAVEMENT: ICD-10-CM

## 2022-06-17 PROCEDURE — 99999 PR PBB SHADOW E&M-EST. PATIENT-LVL IV: CPT | Mod: PBBFAC,,, | Performed by: FAMILY MEDICINE

## 2022-06-17 PROCEDURE — 99999 PR PBB SHADOW E&M-EST. PATIENT-LVL IV: ICD-10-PCS | Mod: PBBFAC,,, | Performed by: FAMILY MEDICINE

## 2022-06-17 PROCEDURE — 99214 OFFICE O/P EST MOD 30 MIN: CPT | Mod: PBBFAC,PN | Performed by: FAMILY MEDICINE

## 2022-06-17 PROCEDURE — 96372 THER/PROPH/DIAG INJ SC/IM: CPT | Mod: PBBFAC,PN

## 2022-06-17 PROCEDURE — 99215 OFFICE O/P EST HI 40 MIN: CPT | Mod: S$PBB,,, | Performed by: FAMILY MEDICINE

## 2022-06-17 PROCEDURE — 99215 PR OFFICE/OUTPT VISIT, EST, LEVL V, 40-54 MIN: ICD-10-PCS | Mod: S$PBB,,, | Performed by: FAMILY MEDICINE

## 2022-06-17 RX ORDER — METFORMIN HYDROCHLORIDE 500 MG/1
500 TABLET, EXTENDED RELEASE ORAL NIGHTLY
Qty: 90 TABLET | Refills: 3 | Status: SHIPPED | OUTPATIENT
Start: 2022-06-17 | End: 2022-10-10 | Stop reason: SDUPTHER

## 2022-06-17 RX ORDER — DICLOFENAC SODIUM 10 MG/G
4 GEL TOPICAL 4 TIMES DAILY
Qty: 450 G | Refills: 3 | Status: SHIPPED | OUTPATIENT
Start: 2022-06-17 | End: 2022-06-17 | Stop reason: SDUPTHER

## 2022-06-17 RX ORDER — TRAZODONE HYDROCHLORIDE 50 MG/1
50 TABLET ORAL NIGHTLY PRN
Qty: 90 TABLET | Refills: 3 | Status: SHIPPED | OUTPATIENT
Start: 2022-06-17 | End: 2022-11-09 | Stop reason: SDUPTHER

## 2022-06-17 RX ORDER — ATORVASTATIN CALCIUM 40 MG/1
40 TABLET, FILM COATED ORAL NIGHTLY
Qty: 90 TABLET | Refills: 3 | Status: SHIPPED | OUTPATIENT
Start: 2022-06-17 | End: 2022-06-17 | Stop reason: SDUPTHER

## 2022-06-17 RX ORDER — TRAZODONE HYDROCHLORIDE 50 MG/1
50 TABLET ORAL NIGHTLY PRN
Qty: 90 TABLET | Refills: 3 | Status: SHIPPED | OUTPATIENT
Start: 2022-06-17 | End: 2022-06-17 | Stop reason: SDUPTHER

## 2022-06-17 RX ORDER — ATORVASTATIN CALCIUM 40 MG/1
40 TABLET, FILM COATED ORAL NIGHTLY
Qty: 90 TABLET | Refills: 3 | Status: SHIPPED | OUTPATIENT
Start: 2022-06-17 | End: 2022-10-10 | Stop reason: SDUPTHER

## 2022-06-17 RX ORDER — CYANOCOBALAMIN 1000 UG/ML
1000 INJECTION, SOLUTION INTRAMUSCULAR; SUBCUTANEOUS ONCE
Status: COMPLETED | OUTPATIENT
Start: 2022-06-17 | End: 2022-06-17

## 2022-06-17 RX ORDER — ESCITALOPRAM OXALATE 10 MG/1
TABLET ORAL
Qty: 145 TABLET | Refills: 3 | Status: SHIPPED | OUTPATIENT
Start: 2022-06-17 | End: 2022-10-10 | Stop reason: SDUPTHER

## 2022-06-17 RX ORDER — DICLOFENAC SODIUM 10 MG/G
4 GEL TOPICAL 4 TIMES DAILY
Qty: 450 G | Refills: 3 | Status: SHIPPED | OUTPATIENT
Start: 2022-06-17 | End: 2022-07-17

## 2022-06-17 RX ADMIN — CYANOCOBALAMIN 1000 MCG: 1000 INJECTION INTRAMUSCULAR; SUBCUTANEOUS at 11:06

## 2022-06-17 NOTE — Clinical Note
I just want to verify that the venous Doppler I ordered to rule out DVT on this patient was scheduled for today or Monday.  This is not something that needs to wait even over the weekend my opinion.  Let me of  to change the order to stat.  If I need to change my

## 2022-06-17 NOTE — TELEPHONE ENCOUNTER
----- Message from Dariana Martin MD sent at 6/17/2022  1:08 PM CDT -----  I just want to verify that the venous Doppler I ordered to rule out DVT on this patient was scheduled for today or Monday.  This is not something that needs to wait even over the weekend my opinion.  Let me of  to change the order to stat.  If I need to change my

## 2022-06-17 NOTE — TELEPHONE ENCOUNTER
----- Message from Dariana Martin MD sent at 6/17/2022 11:24 AM CDT -----  Enter [facility] on [address] as her preferred second after the [facility] [employer] base.

## 2022-06-17 NOTE — PROGRESS NOTES
Subjective:       Patient ID: Cinthya Cuevas is a 87 y.o. female.    Chief Complaint: Follow-up    87-year-old white female here today for follow-up.  When I saw her last, we started low-dose Lexapro.  She started out at 10 mg daily then increase to 15 mg daily.  She feels it is helping significantly.  States that even helps with her bowel movements.  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    Patient has been seen by podiatry for diabetic foot exam and also for fungal infection between the 4th and 5th digit on the right foot.  She states she has remained compliant with applying Betadine nightly.    Patient states she does not check her blood sugars at home, but does take medications as directed.  She has appointment for labs in a week or 2. We will repeat the hemoglobin A1c at that time.    She requests B12 injection.    Patient notes her left foot and lower leg have been swollen for the last couple months.  The swelling does not go down completely with elevation of the leg or even overnight when in bed.  She does note some calf tenderness and sometimes the leg looks a little bit red.  She has been ambulating  with the assistance of her walker, but mostly sits when at home.    Patient states she is due for refills on everything.  The pharmacy told her that she was out.    Depression Patient Health Questionnaire 8/17/2021   Over the last two weeks how often have you been bothered by little interest or pleasure in doing things 0   Over the last two weeks how often have you been bothered by feeling down, depressed or hopeless 0   PHQ-2 Total Score 0       Review of Systems   All other systems reviewed and are negative.        Past Medical History:   Diagnosis Date    Anemia     Arthritis     Diabetes mellitus     Encounter for blood transfusion     Impaired mobility      Past Surgical History:   Procedure Laterality Date     "APPENDECTOMY      BACK SURGERY      3/2015    CHOLECYSTECTOMY      COLONOSCOPY      EYE SURGERY Bilateral     cataract    FRACTURE SURGERY      right ankle 2000    HYSTERECTOMY      JOINT REPLACEMENT      left hip, right knee     History reviewed. No pertinent family history.    Review of patient's allergies indicates:   Allergen Reactions    Cefaclor     Piroxicam Hives       Current Outpatient Medications:     FREESTYLE INSULINX Misc, , Disp: , Rfl:     FREESTYLE LANCETS 28 gauge lancets, , Disp: , Rfl:     FREESTYLE LITE STRIPS Strp, , Disp: , Rfl:     atorvastatin (LIPITOR) 40 MG tablet, Take 1 tablet (40 mg total) by mouth every evening. For cholesterol and diabetes, Disp: 90 tablet, Rfl: 3    diclofenac sodium (VOLTAREN) 1 % Gel, Apply 4 g topically 4 (four) times daily., Disp: 450 g, Rfl: 3    EScitalopram oxalate (LEXAPRO) 10 MG tablet, Take 1 and a half tablets daily., Disp: 145 tablet, Rfl: 3    metFORMIN (GLUCOPHAGE-XR) 500 MG ER 24hr tablet, Take 1 tablet (500 mg total) by mouth every evening., Disp: 90 tablet, Rfl: 3    SITagliptin (JANUVIA) 100 MG Tab, Take 1 tablet (100 mg total) by mouth once daily., Disp: 90 tablet, Rfl: 3    traZODone (DESYREL) 50 MG tablet, Take 1 tablet (50 mg total) by mouth nightly as needed for Insomnia., Disp: 90 tablet, Rfl: 3  No current facility-administered medications for this visit.      Objective:      /74 (BP Location: Left arm, Patient Position: Sitting, BP Method: Medium (Manual))   Pulse 73   Temp 97.4 °F (36.3 °C) (Tympanic)   Ht 5' 2" (1.575 m)   Wt 65.5 kg (144 lb 8.2 oz)   SpO2 99%   BMI 26.43 kg/m²   Physical Exam  Vitals and nursing note reviewed.   Constitutional:       General: She is not in acute distress.     Appearance: Normal appearance. She is well-developed and normal weight. She is not ill-appearing, toxic-appearing or diaphoretic.   HENT:      Head: Normocephalic and atraumatic.      Ears:      Comments: Hearing aid L " "ear, and had a [place] implanted R ear ("made by the cochlear implant folks")   Eyes:      General: No scleral icterus.        Right eye: No discharge.         Left eye: No discharge.      Extraocular Movements: Extraocular movements intact.      Conjunctiva/sclera: Conjunctivae normal.   Neck:      Thyroid: No thyromegaly.      Vascular: No JVD.      Trachea: No tracheal deviation.   Cardiovascular:      Rate and Rhythm: Normal rate and regular rhythm.      Pulses: Normal pulses.      Heart sounds: Normal heart sounds. No murmur heard.    No friction rub. No gallop.   Pulmonary:      Effort: Pulmonary effort is normal. No respiratory distress.      Breath sounds: Normal breath sounds. No wheezing, rhonchi or rales.   Abdominal:      General: Abdomen is flat.      Palpations: Abdomen is soft.   Musculoskeletal:         General: Tenderness (L calf) present.      Cervical back: Normal range of motion and neck supple.      Right lower leg: No edema.      Left lower leg: Edema (L ankle and foot 2+ pitting, left lower and mid leg with 1+ pitting edema) present.        Feet:    Feet:      Comments: Maceration bt 4th and 5th digit with betadine applied  Skin:     General: Skin is warm and dry.      Capillary Refill: Capillary refill takes less than 2 seconds.      Coloration: Skin is not jaundiced or pale.      Findings: No erythema or rash.   Neurological:      General: No focal deficit present.      Mental Status: She is alert and oriented to person, place, and time. Mental status is at baseline.      Gait: Gait abnormal.   Psychiatric:         Mood and Affect: Mood normal.         Behavior: Behavior normal.         Thought Content: Thought content normal.         Judgment: Judgment normal.         Assessment:       1. Localized swelling of left foot    2. Left leg swelling    3. Type II diabetes mellitus with neurological manifestations    4. Sleep disorder    5. Skin ulcer of right foot, limited to breakdown of skin    6. " Grief reaction with prolonged bereavement    7. Fatigue, unspecified type    8. Deficiency of other specified B group vitamins    9. Chronic pain of both knees        Plan:       Localized swelling of left foot  -     US Lower Extremity Veins Left; Future; Expected date: 06/17/2022 (STAT)    Left leg swelling  -     US Lower Extremity Veins Left; Future; Expected date: 06/17/2022 (STAT)    Type II diabetes mellitus with neurological manifestations  -     Lipid Panel; Future; Expected date: 06/17/2022  -     atorvastatin (LIPITOR) 40 MG tablet; Take 1 tablet (40 mg total) by mouth every evening. For cholesterol and diabetes  Dispense: 90 tablet; Refill: 3  -     metFORMIN (GLUCOPHAGE-XR) 500 MG ER 24hr tablet; Take 1 tablet (500 mg total) by mouth every evening.  Dispense: 90 tablet; Refill: 3  -     SITagliptin (JANUVIA) 100 MG Tab; Take 1 tablet (100 mg total) by mouth once daily.  Dispense: 90 tablet; Refill: 3  -     Diabetes Digital Medicine (DDMP) Enrollment Order  -     Diabetes Digital Medicine (DDMP): Assign Onboarding Questionnaires  -     NURSING COMMUNICATION: Create [facility] Account    Sleep disorder  -     traZODone (DESYREL) 50 MG tablet; Take 1 tablet (50 mg total) by mouth nightly as needed for Insomnia.  Dispense: 90 tablet; Refill: 3    Skin ulcer of right foot, limited to breakdown of skin    Grief reaction with prolonged bereavement  -     EScitalopram oxalate (LEXAPRO) 10 MG tablet; Take 1 and a half tablets daily.  Dispense: 145 tablet; Refill: 3    Fatigue, unspecified type  -     cyanocobalamin injection 1,000 mcg    Deficiency of other specified B group vitamins  -     cyanocobalamin injection 1,000 mcg    Chronic pain of both knees  -     diclofenac sodium (VOLTAREN) 1 % Gel; Apply 4 g topically 4 (four) times daily.  Dispense: 450 g; Refill: 3    Continue Betadine application per Podiatry instructions.    Keep lab appointment for next week.    Refill medications as above.    Venous  Doppler left lower extremity to rule out DVT.  Discussed risk of movement or migration of thrombus with ambulation, patient verbalized understanding.  Will address results a soon as report received.    Previous records in Epic were reviewed, including the last 3 months of encounters, imaging, laboratory, and pathology reports.    Strict return precautions reviewed and patient verbalized understanding. Risks, benefits, and alternatives to the plan were reviewed in detail and all questions answered to the patient's satisfaction. Patient agrees to return to clinic or ER if symptoms worsen. 40 minutes total were spent on today's visit, not limited to but including time based on counseling and coordination of care.    Patient instructed that best way to communicate with my office staff is for patient to get on the [facility] patient portal to expedite communication and communication issues that may occur.  Patient was given instructions on how to get on the portal.  I encouraged patient to obtain portal access as well.  Ultimately it is up to the patient to obtain access.  Patient voiced understanding.    This note was created using [employer] voice recognition software that occasionally may misinterpret phrases or words.          Follow up in about 3 months (around 9/17/2022) for annual wellness visit.

## 2022-06-20 ENCOUNTER — HOSPITAL ENCOUNTER (OUTPATIENT)
Dept: RADIOLOGY | Facility: HOSPITAL | Age: 87
Discharge: HOME | End: 2022-06-20
Attending: FAMILY MEDICINE
Payer: MEDICARE

## 2022-06-20 DIAGNOSIS — M79.89 LEFT LEG SWELLING: ICD-10-CM

## 2022-06-20 DIAGNOSIS — R22.42 LOCALIZED SWELLING OF LEFT FOOT: ICD-10-CM

## 2022-06-20 PROCEDURE — 93971 EXTREMITY STUDY: CPT | Mod: 26,LT,, | Performed by: RADIOLOGY

## 2022-06-20 PROCEDURE — 93971 US LOWER EXTREMITY VEINS LEFT: ICD-10-PCS | Mod: 26,LT,, | Performed by: RADIOLOGY

## 2022-06-20 PROCEDURE — 93971 EXTREMITY STUDY: CPT | Mod: TC,LT

## 2022-06-22 ENCOUNTER — LAB VISIT (OUTPATIENT)
Dept: FAMILY MEDICINE | Facility: CLINIC | Age: 87
End: 2022-06-22
Payer: MEDICARE

## 2022-06-22 DIAGNOSIS — D64.9 ANEMIA, UNSPECIFIED TYPE: ICD-10-CM

## 2022-06-22 DIAGNOSIS — E53.8 DEFICIENCY OF OTHER SPECIFIED B GROUP VITAMINS: ICD-10-CM

## 2022-06-22 DIAGNOSIS — E11.49 TYPE II DIABETES MELLITUS WITH NEUROLOGICAL MANIFESTATIONS: ICD-10-CM

## 2022-06-22 LAB
ALBUMIN SERPL BCP-MCNC: 3.6 G/DL (ref 3.5–5.2)
ALP SERPL-CCNC: 75 U/L (ref 55–135)
ALT SERPL W/O P-5'-P-CCNC: 16 U/L (ref 10–44)
ANION GAP SERPL CALC-SCNC: 10 MMOL/L (ref 8–16)
AST SERPL-CCNC: 15 U/L (ref 10–40)
BASOPHILS # BLD AUTO: 0.04 K/UL (ref 0–0.2)
BASOPHILS NFR BLD: 0.7 % (ref 0–1.9)
BILIRUB SERPL-MCNC: 0.4 MG/DL (ref 0.1–1)
BUN SERPL-MCNC: 28 MG/DL (ref 8–23)
CALCIUM SERPL-MCNC: 9.4 MG/DL (ref 8.7–10.5)
CHLORIDE SERPL-SCNC: 104 MMOL/L (ref 95–110)
CHOLEST SERPL-MCNC: 121 MG/DL (ref 120–199)
CHOLEST/HDLC SERPL: 2.7 {RATIO} (ref 2–5)
CO2 SERPL-SCNC: 24 MMOL/L (ref 23–29)
CREAT SERPL-MCNC: 1.1 MG/DL (ref 0.5–1.4)
DIFFERENTIAL METHOD: ABNORMAL
EOSINOPHIL # BLD AUTO: 0.2 K/UL (ref 0–0.5)
EOSINOPHIL NFR BLD: 2.8 % (ref 0–8)
ERYTHROCYTE [DISTWIDTH] IN BLOOD BY AUTOMATED COUNT: 14.3 % (ref 11.5–14.5)
EST. GFR  (AFRICAN AMERICAN): 52.2 ML/MIN/1.73 M^2
EST. GFR  (NON AFRICAN AMERICAN): 45.3 ML/MIN/1.73 M^2
ESTIMATED AVG GLUCOSE: 126 MG/DL (ref 68–131)
FERRITIN SERPL-MCNC: 117 NG/ML (ref 20–300)
GLUCOSE SERPL-MCNC: 112 MG/DL (ref 70–110)
HBA1C MFR BLD: 6 % (ref 4–5.6)
HCT VFR BLD AUTO: 29.1 % (ref 37–48.5)
HDLC SERPL-MCNC: 45 MG/DL (ref 40–75)
HDLC SERPL: 37.2 % (ref 20–50)
HGB BLD-MCNC: 9.1 G/DL (ref 12–16)
IMM GRANULOCYTES # BLD AUTO: 0.03 K/UL (ref 0–0.04)
IMM GRANULOCYTES NFR BLD AUTO: 0.6 % (ref 0–0.5)
IRON SERPL-MCNC: 52 UG/DL (ref 30–160)
LDLC SERPL CALC-MCNC: 60.2 MG/DL (ref 63–159)
LYMPHOCYTES # BLD AUTO: 1.3 K/UL (ref 1–4.8)
LYMPHOCYTES NFR BLD: 24.1 % (ref 18–48)
MCH RBC QN AUTO: 27.5 PG (ref 27–31)
MCHC RBC AUTO-ENTMCNC: 31.3 G/DL (ref 32–36)
MCV RBC AUTO: 88 FL (ref 82–98)
MONOCYTES # BLD AUTO: 0.4 K/UL (ref 0.3–1)
MONOCYTES NFR BLD: 7.5 % (ref 4–15)
NEUTROPHILS # BLD AUTO: 3.5 K/UL (ref 1.8–7.7)
NEUTROPHILS NFR BLD: 64.3 % (ref 38–73)
NONHDLC SERPL-MCNC: 76 MG/DL
NRBC BLD-RTO: 0 /100 WBC
PLATELET # BLD AUTO: 201 K/UL (ref 150–450)
PMV BLD AUTO: 10.4 FL (ref 9.2–12.9)
POTASSIUM SERPL-SCNC: 4.5 MMOL/L (ref 3.5–5.1)
PROT SERPL-MCNC: 7 G/DL (ref 6–8.4)
RBC # BLD AUTO: 3.31 M/UL (ref 4–5.4)
SATURATED IRON: 13 % (ref 20–50)
SODIUM SERPL-SCNC: 138 MMOL/L (ref 136–145)
TOTAL IRON BINDING CAPACITY: 403 UG/DL (ref 250–450)
TRANSFERRIN SERPL-MCNC: 272 MG/DL (ref 200–375)
TRIGL SERPL-MCNC: 79 MG/DL (ref 30–150)
VIT B12 SERPL-MCNC: 1156 PG/ML (ref 210–950)
WBC # BLD AUTO: 5.44 K/UL (ref 3.9–12.7)

## 2022-06-22 PROCEDURE — 84466 ASSAY OF TRANSFERRIN: CPT | Performed by: FAMILY MEDICINE

## 2022-06-22 PROCEDURE — 80061 LIPID PANEL: CPT | Performed by: FAMILY MEDICINE

## 2022-06-22 PROCEDURE — 82728 ASSAY OF FERRITIN: CPT | Performed by: FAMILY MEDICINE

## 2022-06-22 PROCEDURE — 85025 COMPLETE CBC W/AUTO DIFF WBC: CPT | Performed by: FAMILY MEDICINE

## 2022-06-22 PROCEDURE — 80053 COMPREHEN METABOLIC PANEL: CPT | Performed by: FAMILY MEDICINE

## 2022-06-22 PROCEDURE — 82607 VITAMIN B-12: CPT | Performed by: FAMILY MEDICINE

## 2022-06-22 PROCEDURE — 83036 HEMOGLOBIN GLYCOSYLATED A1C: CPT | Performed by: FAMILY MEDICINE

## 2022-06-24 ENCOUNTER — TELEPHONE (OUTPATIENT)
Dept: FAMILY MEDICINE | Facility: CLINIC | Age: 87
End: 2022-06-24
Payer: MEDICARE

## 2022-06-24 NOTE — TELEPHONE ENCOUNTER
----- Message from Dariana Martin MD sent at 6/22/2022  1:00 PM CDT -----  Notify pt NO DVT (no blood clot) in her left leg.

## 2022-07-13 ENCOUNTER — OFFICE VISIT (OUTPATIENT)
Dept: PODIATRY | Facility: CLINIC | Age: 87
End: 2022-07-13
Payer: MEDICARE

## 2022-07-13 VITALS
DIASTOLIC BLOOD PRESSURE: 80 MMHG | HEIGHT: 62 IN | HEART RATE: 86 BPM | WEIGHT: 144 LBS | OXYGEN SATURATION: 97 % | BODY MASS INDEX: 26.5 KG/M2 | SYSTOLIC BLOOD PRESSURE: 136 MMHG

## 2022-07-13 DIAGNOSIS — M20.42 HAMMER TOES OF BOTH FEET: Primary | ICD-10-CM

## 2022-07-13 DIAGNOSIS — L60.0 INGROWN NAIL: ICD-10-CM

## 2022-07-13 DIAGNOSIS — E11.49 TYPE II DIABETES MELLITUS WITH NEUROLOGICAL MANIFESTATIONS: ICD-10-CM

## 2022-07-13 DIAGNOSIS — I73.9 PVD (PERIPHERAL VASCULAR DISEASE): ICD-10-CM

## 2022-07-13 DIAGNOSIS — M20.41 HAMMER TOES OF BOTH FEET: Primary | ICD-10-CM

## 2022-07-13 PROCEDURE — 99213 OFFICE O/P EST LOW 20 MIN: CPT | Mod: PBBFAC,PN | Performed by: PODIATRIST

## 2022-07-13 PROCEDURE — 99213 OFFICE O/P EST LOW 20 MIN: CPT | Mod: S$PBB,,, | Performed by: PODIATRIST

## 2022-07-13 PROCEDURE — 99213 PR OFFICE/OUTPT VISIT, EST, LEVL III, 20-29 MIN: ICD-10-PCS | Mod: S$PBB,,, | Performed by: PODIATRIST

## 2022-07-13 PROCEDURE — 99999 PR PBB SHADOW E&M-EST. PATIENT-LVL III: CPT | Mod: PBBFAC,,, | Performed by: PODIATRIST

## 2022-07-13 PROCEDURE — 99999 PR PBB SHADOW E&M-EST. PATIENT-LVL III: ICD-10-PCS | Mod: PBBFAC,,, | Performed by: PODIATRIST

## 2022-07-16 NOTE — PROGRESS NOTES
Subjective:       Patient ID: Cinthya Cuevas is a 87 y.o. female.    Chief Complaint: Diabetic Foot Exam  Patient presents for follow-up redness swelling in moisture 4th webspace right foot.  She has been applying iodine daily, not sure it is soaking in, she continues to have discomfort and irritation in this location.  Has been experiencing a a lot of swelling and some discomfort of the left ankle, denies injury.  Some discomfort due to callus under the big toe and outside of the 5th digit right foot. History of diabetes, relates it remains well controlled, glucose 127 this morning.      Past Medical History:   Diagnosis Date    Anemia     Arthritis     Diabetes mellitus     Encounter for blood transfusion     Impaired mobility      Past Surgical History:   Procedure Laterality Date    APPENDECTOMY      BACK SURGERY      3/2015    CHOLECYSTECTOMY      COLONOSCOPY      EYE SURGERY Bilateral     cataract    FRACTURE SURGERY      right ankle     HYSTERECTOMY      JOINT REPLACEMENT      left hip, right knee     No family history on file.  Social History     Socioeconomic History    Marital status:    Tobacco Use    Smoking status: Former Smoker     Packs/day: 0.25     Years: 31.00     Pack years: 7.75     Types: Cigarettes     Start date: 1957     Quit date: 1988     Years since quittin.5    Smokeless tobacco: Never Used   Substance and Sexual Activity    Alcohol use: No    Drug use: No       Current Outpatient Medications   Medication Sig Dispense Refill    atorvastatin (LIPITOR) 40 MG tablet Take 1 tablet (40 mg total) by mouth every evening. For cholesterol and diabetes 90 tablet 3    diclofenac sodium (VOLTAREN) 1 % Gel Apply 4 g topically 4 (four) times daily. 450 g 3    EScitalopram oxalate (LEXAPRO) 10 MG tablet Take 1 and a half tablets daily. 145 tablet 3    FREESTYLE INSULINX Misc       FREESTYLE LANCETS 28 gauge lancets       FREESTYLE LITE STRIPS Strp     "   metFORMIN (GLUCOPHAGE-XR) 500 MG ER 24hr tablet Take 1 tablet (500 mg total) by mouth every evening. 90 tablet 3    SITagliptin (JANUVIA) 100 MG Tab Take 1 tablet (100 mg total) by mouth once daily. 90 tablet 3    traZODone (DESYREL) 50 MG tablet Take 1 tablet (50 mg total) by mouth nightly as needed for Insomnia. 90 tablet 3     No current facility-administered medications for this visit.     Review of patient's allergies indicates:   Allergen Reactions    Cefaclor     Piroxicam Hives       Review of Systems   HENT: Negative for congestion.    Respiratory: Negative for cough and shortness of breath.    Cardiovascular: Positive for leg swelling.   Musculoskeletal: Positive for gait problem.   All other systems reviewed and are negative.      Objective:      Vitals:    07/13/22 1215   BP: 136/80   Pulse: 86   SpO2: 97%   Weight: 65.3 kg (144 lb)   Height: 5' 2" (1.575 m)     Physical Exam  Vitals and nursing note reviewed.   Constitutional:       General: She is not in acute distress.     Appearance: Normal appearance.   Cardiovascular:      Pulses:           Dorsalis pedis pulses are 1+ on the right side and 1+ on the left side.        Posterior tibial pulses are 0 on the right side and 0 on the left side.   Pulmonary:      Effort: Pulmonary effort is normal.   Musculoskeletal:         General: Swelling and tenderness present.      Right foot: Decreased range of motion (Arthritic and degenerative changes bilateral feet).      Left foot: Decreased range of motion.      Comments: Edematous tender left ankle.  No ecchymoses, erythema or calor   Feet:      Right foot:      Skin integrity: Erythema (mild irritation, light erythema with maceration still present 4th webspace with no change.  Nose skin break) and callus (Tender callus well-defined sub 1st met head and lateral 5th met head right foot, no discoloration or erythema) present.      Toenail Condition: Right toenails are abnormally thick and long. Fungal " disease present.     Left foot:      Skin integrity: No erythema.      Toenail Condition: Left toenails are abnormally thick and long. Fungal disease present.  Skin:     Capillary Refill: Capillary refill takes 2 to 3 seconds.   Neurological:      General: No focal deficit present.      Mental Status: She is alert.   Psychiatric:         Mood and Affect: Mood normal.                            Assessment:       1. Hammer toes of both feet    2. PVD (peripheral vascular disease)    3. Type II diabetes mellitus with neurological manifestations    4. Ingrown nail        Plan:           Reassured patient area of 4th webspace right foot is improved since last visit, there still is moisture present within the webspace in this definitely needs to be treated to avoid complications.  We reviewed antifungal or baby powder to absorb moisture reduce friction in addition to iodine.  Advised patient soft padding between the toes needs to be utilized to keep toes  prevent pressure and allow air to pass through this space.  2 x 2 gauze was folded and applied in the webspace.  Showed patient how to apply daily, keep between the toes as long as well tolerated and comfortable throughout the entire day.  Apply iodine and powder in the morning, reapply before bed.  Monitor closely for any changes.  Reviewed swelling, arthritis, foot type and structure and calluses right foot.  If area sub 1st met head and lateral 5th met head right foot were debrided.  Advised patient these areas need to be monitored but reassured no discoloration or complication at this time.    Reviewed appropriate shoes indoors at all times for stability and to protect feet.  Would recommend she sat down in the middle of the day, elevate feet for swelling and remove her shoes to allow area 4th webspace to dry  Reviewed ice/cool therapy for swelling left ankle.  Patient was fitted for XL ankle gauntlet/sleeve.  She reports fit and felt very good today and  reassured she could remove and reapply this herself she was instructed to apply each morning, remove in the evening.  Instructed to remove time area if compression is too tight or uncomfortable.  Reviewed diabetic education  Reviewed care and maintenance of skin, calluses, nails.  Calluses and nails debrided with no complications at this time but patient is at risk due to diabetes and limited mobility  Reviewed need for daily foot checks and instructed patient to contact the office with any area of redness or swelling which has not improved within 3 days.  Patient was in understanding and agreement with treatment plan.  I counseled the patient on their conditions, implications and medical management.  Instructed patient to contact the office with any changes, questions, concerns, worsening of symptoms.   Total face to face time, exam, assessment, treatment, discussion, documentation 20 minutes, more than half this time spent on consultation and coordination of care.   Follow up 3 months      This note was created using [employer] voice recognition software that occasionally misinterpreted phrases or words.

## 2022-08-09 ENCOUNTER — OFFICE VISIT (OUTPATIENT)
Dept: FAMILY MEDICINE | Facility: CLINIC | Age: 87
End: 2022-08-09
Payer: MEDICARE

## 2022-08-09 VITALS
SYSTOLIC BLOOD PRESSURE: 136 MMHG | HEART RATE: 67 BPM | DIASTOLIC BLOOD PRESSURE: 70 MMHG | HEIGHT: 62 IN | TEMPERATURE: 99 F | BODY MASS INDEX: 26.8 KG/M2 | OXYGEN SATURATION: 98 % | WEIGHT: 145.63 LBS

## 2022-08-09 DIAGNOSIS — F43.29 GRIEF REACTION WITH PROLONGED BEREAVEMENT: ICD-10-CM

## 2022-08-09 DIAGNOSIS — E11.49 TYPE II DIABETES MELLITUS WITH NEUROLOGICAL MANIFESTATIONS: Primary | ICD-10-CM

## 2022-08-09 DIAGNOSIS — E53.8 LOW SERUM VITAMIN B12: ICD-10-CM

## 2022-08-09 DIAGNOSIS — S51.812A SKIN TEAR OF LEFT FOREARM WITHOUT COMPLICATION, INITIAL ENCOUNTER: ICD-10-CM

## 2022-08-09 PROCEDURE — 96372 THER/PROPH/DIAG INJ SC/IM: CPT | Mod: PBBFAC,PN

## 2022-08-09 PROCEDURE — 99214 OFFICE O/P EST MOD 30 MIN: CPT | Mod: S$PBB,,, | Performed by: FAMILY MEDICINE

## 2022-08-09 PROCEDURE — 99999 PR PBB SHADOW E&M-EST. PATIENT-LVL IV: ICD-10-PCS | Mod: PBBFAC,,, | Performed by: FAMILY MEDICINE

## 2022-08-09 PROCEDURE — 99214 PR OFFICE/OUTPT VISIT, EST, LEVL IV, 30-39 MIN: ICD-10-PCS | Mod: S$PBB,,, | Performed by: FAMILY MEDICINE

## 2022-08-09 PROCEDURE — 99999 PR PBB SHADOW E&M-EST. PATIENT-LVL IV: CPT | Mod: PBBFAC,,, | Performed by: FAMILY MEDICINE

## 2022-08-09 PROCEDURE — 99214 OFFICE O/P EST MOD 30 MIN: CPT | Mod: PBBFAC,PN | Performed by: FAMILY MEDICINE

## 2022-08-09 RX ORDER — CYANOCOBALAMIN 1000 UG/ML
1000 INJECTION, SOLUTION INTRAMUSCULAR; SUBCUTANEOUS ONCE
Status: COMPLETED | OUTPATIENT
Start: 2022-08-09 | End: 2022-08-09

## 2022-08-09 RX ADMIN — CYANOCOBALAMIN 1000 MCG: 1000 INJECTION INTRAMUSCULAR; SUBCUTANEOUS at 11:08

## 2022-08-09 NOTE — PATIENT INSTRUCTIONS
Skin-Prep spray or swabs to help prevent skin irritation from medical tape, okay to spray on skin tear

## 2022-08-09 NOTE — PROGRESS NOTES
CBC:   Lab Results   Component Value Date    WBC 5.44 06/22/2022    HGB 9.1 (L) 06/22/2022    HCT 29.1 (L) 06/22/2022     06/22/2022    MCV 88 06/22/2022    RDW 14.3 06/22/2022     CMP:   Lab Results   Component Value Date     06/22/2022    K 4.5 06/22/2022     06/22/2022    CO2 24 06/22/2022    BUN 28 (H) 06/22/2022    CREATININE 1.1 06/22/2022     (H) 06/22/2022    CALCIUM 9.4 06/22/2022    MG 1.7 09/08/2021    ALKPHOS 75 06/22/2022    PROT 7.0 06/22/2022    ALBUMIN 3.6 06/22/2022    BILITOT 0.4 06/22/2022    AST 15 06/22/2022    ALT 16 06/22/2022     LIPIDS:   Lab Results   Component Value Date    CHOL 121 06/22/2022    HDL 45 06/22/2022    LDLCALC 60.2 (L) 06/22/2022    TRIG 79 06/22/2022    CHOLHDL 37.2 06/22/2022     HA1C:   Lab Results   Component Value Date    HGBA1C 6.0 (H) 06/22/2022     TSH:   Lab Results   Component Value Date    TSH 2.075 09/08/2021         Subjective:       Patient ID: Cinthya Cuevas is a 87 y.o. female.    Chief Complaint: Follow-up    DM--In regards to the patient's diabetes, patient denies hypoglycemic symptoms or any symptoms of fluctuating blood glucose. FSG running normal, sometimes in the 130s. No highs or lows. Denies hypoglycemic symptoms.    Iron def anemia--She is taking OTC [employer] Blood Builders. Tolerating well, no constipation.    HLD--In regards to the patient's dyslipidemia, patient reports compliance with medication regimen without side effects.    Sleep--In regards to insomnia and sleep disorder, patient reports compliance with medication regimen without side effects.    Reports skin tear which occurred when her grandson helped her get up from the chair. Has been keeping it covered with large bandaid.      Depression Patient Health Questionnaire 8/17/2021   Over the last two weeks how often have you been bothered by little interest or pleasure in doing things Not at all   Over the last two weeks how often have you been bothered by  "feeling down, depressed or hopeless Not at all   PHQ-2 Total Score 0     Review of Systems   All other systems reviewed and are negative.        Past Medical History:   Diagnosis Date    Anemia     Arthritis     Diabetes mellitus     Encounter for blood transfusion     Impaired mobility      Past Surgical History:   Procedure Laterality Date    APPENDECTOMY      BACK SURGERY      3/2015    CHOLECYSTECTOMY      COLONOSCOPY      EYE SURGERY Bilateral     cataract    FRACTURE SURGERY      right ankle 2000    HYSTERECTOMY      JOINT REPLACEMENT      left hip, right knee     History reviewed. No pertinent family history.    Review of patient's allergies indicates:  No Known Allergies    Current Outpatient Medications:     atorvastatin (LIPITOR) 40 MG tablet, Take 1 tablet (40 mg total) by mouth every evening. For cholesterol and diabetes, Disp: 90 tablet, Rfl: 3    EScitalopram oxalate (LEXAPRO) 10 MG tablet, Take 1 and a half tablets daily., Disp: 145 tablet, Rfl: 3    FREESTYLE INSULINX Misc, , Disp: , Rfl:     FREESTYLE LANCETS 28 gauge lancets, , Disp: , Rfl:     FREESTYLE LITE STRIPS Strp, , Disp: , Rfl:     metFORMIN (GLUCOPHAGE-XR) 500 MG ER 24hr tablet, Take 1 tablet (500 mg total) by mouth every evening., Disp: 90 tablet, Rfl: 3    SITagliptin (JANUVIA) 100 MG Tab, Take 1 tablet (100 mg total) by mouth once daily., Disp: 90 tablet, Rfl: 3    traZODone (DESYREL) 50 MG tablet, Take 1 tablet (50 mg total) by mouth nightly as needed for Insomnia., Disp: 90 tablet, Rfl: 3    diclofenac sodium (VOLTAREN) 1 % Gel, Apply 4 g topically 4 (four) times daily., Disp: 450 g, Rfl: 3    sulfamethoxazole-trimethoprim 800-160mg (BACTRIM DS) 800-160 mg Tab, Take 1 tablet by mouth 2 (two) times daily. for 14 days, Disp: 28 tablet, Rfl: 0    Objective:      /70 (BP Location: Right arm, Patient Position: Sitting, BP Method: Medium (Manual))   Pulse 67   Temp 99.4 °F (37.4 °C) (Tympanic)   Ht 5' 2" " "(1.575 m)   Wt 66 kg (145 lb 9.8 oz)   SpO2 98%   BMI 26.63 kg/m²   Physical Exam  Vitals and nursing note reviewed.   Constitutional:       General: She is not in acute distress.     Appearance: Normal appearance. She is well-developed and normal weight. She is not ill-appearing, toxic-appearing or diaphoretic.   HENT:      Head: Normocephalic and atraumatic.      Ears:      Comments: Hearing aid L ear, and had a [place] implanted R ear ("made by the cochlear implant folks")   Eyes:      General: No scleral icterus.        Right eye: No discharge.         Left eye: No discharge.      Extraocular Movements: Extraocular movements intact.      Conjunctiva/sclera: Conjunctivae normal.   Neck:      Thyroid: No thyromegaly.      Vascular: No JVD.      Trachea: No tracheal deviation.   Cardiovascular:      Rate and Rhythm: Normal rate and regular rhythm.      Pulses: Normal pulses.      Heart sounds: Normal heart sounds. No murmur heard.    No friction rub. No gallop.   Pulmonary:      Effort: Pulmonary effort is normal. No respiratory distress.      Breath sounds: Normal breath sounds. No wheezing, rhonchi or rales.   Abdominal:      General: Abdomen is flat.      Palpations: Abdomen is soft.   Musculoskeletal:         General: Normal range of motion.      Cervical back: Normal range of motion and neck supple. No tenderness.      Right lower leg: No edema.      Left lower leg: No edema.   Lymphadenopathy:      Cervical: No cervical adenopathy.   Skin:     General: Skin is warm and dry.      Capillary Refill: Capillary refill takes less than 2 seconds.      Coloration: Skin is not jaundiced or pale.      Findings: Bruising (skin tear L dorsal forearm) present. No erythema or rash.   Neurological:      General: No focal deficit present.      Mental Status: She is alert and oriented to person, place, and time. Mental status is at baseline.      Motor: No weakness.      Coordination: Coordination normal.      Gait: Gait " normal.   Psychiatric:         Mood and Affect: Mood normal.         Behavior: Behavior normal.         Thought Content: Thought content normal.         Judgment: Judgment normal.         Assessment:       1. Type II diabetes mellitus with neurological manifestations    2. Skin tear of left forearm without complication, initial encounter    3. Grief reaction with prolonged bereavement    4. Low serum vitamin B12        Plan:       Type II diabetes mellitus with neurological manifestations  -     cyanocobalamin injection 1,000 mcg    Skin tear of left forearm without complication, initial encounter    Grief reaction with prolonged bereavement    Low serum vitamin B12  -     cyanocobalamin injection 1,000 mcg    Labs reviewed in detail with patient and all questions answered to her satisfaction.  B12 injection today and monthly.  Continue [employer] Blood Builders.  Tegaderm for skin tear L forearm.  No med changes today.    Strict return precautions reviewed and patient verbalized understanding. Risks, benefits, and alternatives to the plan were reviewed in detail and all questions answered to the patient's satisfaction. Patient agrees to return to clinic or ER if symptoms worsen. 30 minutes total were spent on today's visit, not limited to but including time based on counseling and coordination of care.    Patient instructed that best way to communicate with my office staff is for patient to get on the [facility] patient portal to expedite communication and communication issues that may occur.  Patient was given instructions on how to get on the portal.  I encouraged patient to obtain portal access as well.  Ultimately it is up to the patient to obtain access.  Patient voiced understanding.    This note was created using [email or web address] voice recognition software that occasionally may misinterpret phrases or words.          Follow up in about 3 months (around 11/9/2022) for f/u DM, low B12, low iron.

## 2022-08-16 ENCOUNTER — TELEPHONE (OUTPATIENT)
Dept: PODIATRY | Facility: CLINIC | Age: 87
End: 2022-08-16
Payer: MEDICARE

## 2022-08-16 ENCOUNTER — OFFICE VISIT (OUTPATIENT)
Dept: PODIATRY | Facility: CLINIC | Age: 87
End: 2022-08-16
Payer: MEDICARE

## 2022-08-16 VITALS
TEMPERATURE: 98 F | WEIGHT: 145 LBS | DIASTOLIC BLOOD PRESSURE: 73 MMHG | SYSTOLIC BLOOD PRESSURE: 143 MMHG | HEART RATE: 73 BPM | BODY MASS INDEX: 26.68 KG/M2 | HEIGHT: 62 IN

## 2022-08-16 DIAGNOSIS — E11.49 TYPE II DIABETES MELLITUS WITH NEUROLOGICAL MANIFESTATIONS: ICD-10-CM

## 2022-08-16 DIAGNOSIS — L97.512 ULCER OF RIGHT FOOT WITH FAT LAYER EXPOSED: Primary | ICD-10-CM

## 2022-08-16 DIAGNOSIS — E11.9 COMPREHENSIVE DIABETIC FOOT EXAMINATION, TYPE 2 DM, ENCOUNTER FOR: ICD-10-CM

## 2022-08-16 DIAGNOSIS — B35.3 TINEA PEDIS OF RIGHT FOOT: ICD-10-CM

## 2022-08-16 PROCEDURE — 99214 OFFICE O/P EST MOD 30 MIN: CPT | Mod: S$PBB,,, | Performed by: PODIATRIST

## 2022-08-16 PROCEDURE — 96372 THER/PROPH/DIAG INJ SC/IM: CPT | Mod: PBBFAC,PN

## 2022-08-16 PROCEDURE — 87186 SC STD MICRODIL/AGAR DIL: CPT | Performed by: PODIATRIST

## 2022-08-16 PROCEDURE — 99999 PR PBB SHADOW E&M-EST. PATIENT-LVL III: ICD-10-PCS | Mod: PBBFAC,,, | Performed by: PODIATRIST

## 2022-08-16 PROCEDURE — 87077 CULTURE AEROBIC IDENTIFY: CPT | Performed by: PODIATRIST

## 2022-08-16 PROCEDURE — 99214 PR OFFICE/OUTPT VISIT, EST, LEVL IV, 30-39 MIN: ICD-10-PCS | Mod: S$PBB,,, | Performed by: PODIATRIST

## 2022-08-16 PROCEDURE — 99999 PR PBB SHADOW E&M-EST. PATIENT-LVL III: CPT | Mod: PBBFAC,,, | Performed by: PODIATRIST

## 2022-08-16 PROCEDURE — 99213 OFFICE O/P EST LOW 20 MIN: CPT | Mod: PBBFAC,PN | Performed by: PODIATRIST

## 2022-08-16 PROCEDURE — 87070 CULTURE OTHR SPECIMN AEROBIC: CPT | Performed by: PODIATRIST

## 2022-08-16 RX ORDER — CEFTRIAXONE 1 G/1
1 INJECTION, POWDER, FOR SOLUTION INTRAMUSCULAR; INTRAVENOUS ONCE
Status: COMPLETED | OUTPATIENT
Start: 2022-08-16 | End: 2022-08-16

## 2022-08-16 RX ORDER — LIDOCAINE HYDROCHLORIDE 10 MG/ML
1 INJECTION INFILTRATION; PERINEURAL ONCE
Status: COMPLETED | OUTPATIENT
Start: 2022-08-16 | End: 2022-08-16

## 2022-08-16 RX ORDER — KETOROLAC TROMETHAMINE 30 MG/ML
30 INJECTION, SOLUTION INTRAMUSCULAR; INTRAVENOUS
Status: COMPLETED | OUTPATIENT
Start: 2022-08-16 | End: 2022-08-16

## 2022-08-16 RX ORDER — SULFAMETHOXAZOLE AND TRIMETHOPRIM 800; 160 MG/1; MG/1
1 TABLET ORAL 2 TIMES DAILY
Qty: 28 TABLET | Refills: 0 | Status: SHIPPED | OUTPATIENT
Start: 2022-08-16 | End: 2022-08-30

## 2022-08-16 RX ADMIN — CEFTRIAXONE SODIUM 1 G: 1 INJECTION, POWDER, FOR SOLUTION INTRAMUSCULAR; INTRAVENOUS at 04:08

## 2022-08-16 RX ADMIN — KETOROLAC TROMETHAMINE 30 MG: 30 INJECTION, SOLUTION INTRAMUSCULAR; INTRAVENOUS at 03:08

## 2022-08-16 RX ADMIN — LIDOCAINE HYDROCHLORIDE 1 ML: 10 INJECTION, SOLUTION INFILTRATION; PERINEURAL at 04:08

## 2022-08-16 NOTE — TELEPHONE ENCOUNTER
----- Message from Paris Price sent at 8/16/2022 11:24 AM CDT -----  Contact: pt at [phone]  Type: Needs Medical Advice  Who Called:  pt    Best Call Back Number: [phone]    Additional Information: pt is calling the office stating she's in a lot of pain and need to speak with someone urgently. Please call back and advise.

## 2022-08-19 ENCOUNTER — TELEPHONE (OUTPATIENT)
Dept: PODIATRY | Facility: CLINIC | Age: 87
End: 2022-08-19
Payer: MEDICARE

## 2022-08-19 LAB — BACTERIA SPEC AEROBE CULT: ABNORMAL

## 2022-08-19 NOTE — TELEPHONE ENCOUNTER
----- Message from Theo Urias DPM sent at 8/19/2022  2:45 PM CDT -----  Please call the patient and advise per her culture and sensitivity she is to continue taking the Bactrim as prescribed as long as it is not causing her any issues.  Thank you

## 2022-08-20 NOTE — PROGRESS NOTES
Subjective:       Patient ID: Cinthya Cuevas is a 87 y.o. female.    Chief Complaint: Diabetes Mellitus, Follow-up, and Foot Ulcer  Patient presents for follow-up redness swelling in moisture 4th webspace right foot.        Past Medical History:   Diagnosis Date    Anemia     Arthritis     Diabetes mellitus     Encounter for blood transfusion     Impaired mobility      Past Surgical History:   Procedure Laterality Date    APPENDECTOMY      BACK SURGERY      3/2015    CHOLECYSTECTOMY      COLONOSCOPY      EYE SURGERY Bilateral     cataract    FRACTURE SURGERY      right ankle     HYSTERECTOMY      JOINT REPLACEMENT      left hip, right knee     History reviewed. No pertinent family history.  Social History     Socioeconomic History    Marital status:    Tobacco Use    Smoking status: Former Smoker     Packs/day: 0.25     Years: 31.00     Pack years: 7.75     Types: Cigarettes     Start date: 1957     Quit date: 1988     Years since quittin.6    Smokeless tobacco: Never Used   Substance and Sexual Activity    Alcohol use: No    Drug use: No       Current Outpatient Medications   Medication Sig Dispense Refill    atorvastatin (LIPITOR) 40 MG tablet Take 1 tablet (40 mg total) by mouth every evening. For cholesterol and diabetes 90 tablet 3    EScitalopram oxalate (LEXAPRO) 10 MG tablet Take 1 and a half tablets daily. 145 tablet 3    FREESTYLE INSULINX Misc       FREESTYLE LANCETS 28 gauge lancets       FREESTYLE LITE STRIPS Strp       metFORMIN (GLUCOPHAGE-XR) 500 MG ER 24hr tablet Take 1 tablet (500 mg total) by mouth every evening. 90 tablet 3    SITagliptin (JANUVIA) 100 MG Tab Take 1 tablet (100 mg total) by mouth once daily. 90 tablet 3    traZODone (DESYREL) 50 MG tablet Take 1 tablet (50 mg total) by mouth nightly as needed for Insomnia. 90 tablet 3    diclofenac sodium (VOLTAREN) 1 % Gel Apply 4 g topically 4 (four) times daily. 450 g 3     "sulfamethoxazole-trimethoprim 800-160mg (BACTRIM DS) 800-160 mg Tab Take 1 tablet by mouth 2 (two) times daily. for 14 days 28 tablet 0     No current facility-administered medications for this visit.     Review of patient's allergies indicates:  No Known Allergies    Review of Systems   HENT: Negative for congestion.    Respiratory: Negative for cough and shortness of breath.    Cardiovascular: Positive for leg swelling.   Musculoskeletal: Positive for gait problem.   All other systems reviewed and are negative.      Objective:      Vitals:    08/16/22 1431   BP: (!) 143/73   Pulse: 73   Temp: 97.6 °F (36.4 °C)   Weight: 65.8 kg (145 lb)   Height: 5' 2" (1.575 m)     Physical Exam  Vitals and nursing note reviewed.   Constitutional:       General: She is not in acute distress.     Appearance: Normal appearance.   Cardiovascular:      Pulses:           Dorsalis pedis pulses are 1+ on the right side and 1+ on the left side.        Posterior tibial pulses are 0 on the right side and 0 on the left side.   Pulmonary:      Effort: Pulmonary effort is normal.   Musculoskeletal:         General: Swelling and tenderness present.      Right foot: Decreased range of motion (Arthritic and degenerative changes bilateral feet).      Left foot: Decreased range of motion.        Feet:       Comments: Edematous tender left ankle.  No ecchymoses, erythema or calor   Feet:      Right foot:      Skin integrity: Erythema (mild irritation, light erythema with maceration still present 4th webspace with no change.  Nose skin break) and callus (Tender callus well-defined sub 1st met head and lateral 5th met head right foot, no discoloration or erythema) present.      Toenail Condition: Right toenails are abnormally thick and long. Fungal disease present.     Left foot:      Skin integrity: No erythema.      Toenail Condition: Left toenails are abnormally thick and long. Fungal disease present.  Skin:     Capillary Refill: Capillary refill " takes 2 to 3 seconds.   Neurological:      General: No focal deficit present.      Mental Status: She is alert.   Psychiatric:         Mood and Affect: Mood normal.                                    Assessment:       1. Ulcer of right foot with fat layer exposed    2. Type II diabetes mellitus with neurological manifestations    3. Comprehensive diabetic foot examination, type 2 DM, encounter for    4. Tinea pedis of right foot        Plan:           Patient presents today for follow-up of an ulceration 4th webspace right.  Patient states it is extremely painful sore she is having a lot of discomfort she is having swelling and drainage.  Patient has putting been putting both Betadine and antibiotic ointment and states it is not getting better.  Patient was advised clearly she likely started out with a fungal infection now she has a secondary bacterial infection I did perform a culture and sensitivity on the area I have started the monitor patient on Bactrim I advised her under no circumstances should she be putting antibiotic ointment on the area she was administered a Rocephin injection IM right side Toradol injection IM left due to the severity of pain and inflammation in the area.  Patient advised she is to apply Betadine daily Betadine was applied and a Silipos toe spacer placed in between the toes to keep them  I advised the patient she needs to air this out she needs to make sure she dries well between her toes after bathing and I plan to follow up with her in 1 week certainly if the patient's condition worsens she is to contact us immediately.This note was created using [employer] voice recognition software that occasionally misinterpreted phrases or words.

## 2022-08-21 PROBLEM — E53.8 LOW SERUM VITAMIN B12: Status: ACTIVE | Noted: 2022-08-21

## 2022-08-22 ENCOUNTER — OFFICE VISIT (OUTPATIENT)
Dept: PODIATRY | Facility: CLINIC | Age: 87
End: 2022-08-22
Payer: MEDICARE

## 2022-08-22 VITALS
DIASTOLIC BLOOD PRESSURE: 63 MMHG | SYSTOLIC BLOOD PRESSURE: 95 MMHG | WEIGHT: 145 LBS | TEMPERATURE: 98 F | HEART RATE: 72 BPM | HEIGHT: 62 IN | BODY MASS INDEX: 26.68 KG/M2

## 2022-08-22 DIAGNOSIS — L60.0 INGROWN NAIL: ICD-10-CM

## 2022-08-22 DIAGNOSIS — E11.9 COMPREHENSIVE DIABETIC FOOT EXAMINATION, TYPE 2 DM, ENCOUNTER FOR: ICD-10-CM

## 2022-08-22 DIAGNOSIS — E11.49 TYPE II DIABETES MELLITUS WITH NEUROLOGICAL MANIFESTATIONS: ICD-10-CM

## 2022-08-22 DIAGNOSIS — G47.9 SLEEP DISORDER: ICD-10-CM

## 2022-08-22 DIAGNOSIS — L97.512 ULCER OF RIGHT FOOT WITH FAT LAYER EXPOSED: Primary | ICD-10-CM

## 2022-08-22 PROCEDURE — 99999 PR PBB SHADOW E&M-EST. PATIENT-LVL III: CPT | Mod: PBBFAC,,, | Performed by: PODIATRIST

## 2022-08-22 PROCEDURE — 99999 PR PBB SHADOW E&M-EST. PATIENT-LVL III: ICD-10-PCS | Mod: PBBFAC,,, | Performed by: PODIATRIST

## 2022-08-22 PROCEDURE — 99214 OFFICE O/P EST MOD 30 MIN: CPT | Mod: S$PBB,,, | Performed by: PODIATRIST

## 2022-08-22 PROCEDURE — 99213 OFFICE O/P EST LOW 20 MIN: CPT | Mod: PBBFAC | Performed by: PODIATRIST

## 2022-08-22 PROCEDURE — 99214 PR OFFICE/OUTPT VISIT, EST, LEVL IV, 30-39 MIN: ICD-10-PCS | Mod: S$PBB,,, | Performed by: PODIATRIST

## 2022-08-23 PROBLEM — F43.29 GRIEF REACTION WITH PROLONGED BEREAVEMENT: Status: RESOLVED | Noted: 2021-09-16 | Resolved: 2022-08-23

## 2022-08-23 NOTE — PROGRESS NOTES
Subjective:       Patient ID: Cinthya Cuevas is a 87 y.o. female.    Chief Complaint: Follow-up and Foot Ulcer  Patient presents for follow-up redness swelling in moisture 4th webspace right foot.        Past Medical History:   Diagnosis Date    Anemia     Arthritis     Diabetes mellitus     Encounter for blood transfusion     Impaired mobility      Past Surgical History:   Procedure Laterality Date    APPENDECTOMY      BACK SURGERY      3/2015    CHOLECYSTECTOMY      COLONOSCOPY      EYE SURGERY Bilateral     cataract    FRACTURE SURGERY      right ankle     HYSTERECTOMY      JOINT REPLACEMENT      left hip, right knee     History reviewed. No pertinent family history.  Social History     Socioeconomic History    Marital status:    Tobacco Use    Smoking status: Former Smoker     Packs/day: 0.25     Years: 31.00     Pack years: 7.75     Types: Cigarettes     Start date: 1957     Quit date: 1988     Years since quittin.6    Smokeless tobacco: Never Used   Substance and Sexual Activity    Alcohol use: No    Drug use: No       Current Outpatient Medications   Medication Sig Dispense Refill    atorvastatin (LIPITOR) 40 MG tablet Take 1 tablet (40 mg total) by mouth every evening. For cholesterol and diabetes 90 tablet 3    EScitalopram oxalate (LEXAPRO) 10 MG tablet Take 1 and a half tablets daily. 145 tablet 3    FREESTYLE INSULINX Misc       FREESTYLE LANCETS 28 gauge lancets       FREESTYLE LITE STRIPS Strp       metFORMIN (GLUCOPHAGE-XR) 500 MG ER 24hr tablet Take 1 tablet (500 mg total) by mouth every evening. 90 tablet 3    SITagliptin (JANUVIA) 100 MG Tab Take 1 tablet (100 mg total) by mouth once daily. 90 tablet 3    sulfamethoxazole-trimethoprim 800-160mg (BACTRIM DS) 800-160 mg Tab Take 1 tablet by mouth 2 (two) times daily. for 14 days 28 tablet 0    traZODone (DESYREL) 50 MG tablet Take 1 tablet (50 mg total) by mouth nightly as needed for Insomnia.  "90 tablet 3    diclofenac sodium (VOLTAREN) 1 % Gel Apply 4 g topically 4 (four) times daily. 450 g 3     No current facility-administered medications for this visit.     Review of patient's allergies indicates:  No Known Allergies    Review of Systems   HENT: Negative for congestion.    Respiratory: Negative for cough and shortness of breath.    Cardiovascular: Positive for leg swelling.   Musculoskeletal: Positive for gait problem.   Skin: Positive for color change and wound.   All other systems reviewed and are negative.      Objective:      Vitals:    08/22/22 1011   BP: 95/63   Pulse: 72   Temp: 98 °F (36.7 °C)   Weight: 65.8 kg (145 lb)   Height: 5' 2" (1.575 m)     Physical Exam  Vitals and nursing note reviewed.   Constitutional:       General: She is not in acute distress.     Appearance: Normal appearance.   Cardiovascular:      Pulses:           Dorsalis pedis pulses are 1+ on the right side and 1+ on the left side.        Posterior tibial pulses are 0 on the right side and 0 on the left side.   Pulmonary:      Effort: Pulmonary effort is normal.   Musculoskeletal:         General: Swelling and tenderness present.      Right foot: Decreased range of motion (Arthritic and degenerative changes bilateral feet).      Left foot: Decreased range of motion.        Feet:       Comments: Edematous tender left ankle.  No ecchymoses, erythema or calor   Feet:      Right foot:      Skin integrity: Erythema (mild irritation, light erythema with maceration still present 4th webspace with no change.  Nose skin break) and callus (Tender callus well-defined sub 1st met head and lateral 5th met head right foot, no discoloration or erythema) present.      Toenail Condition: Right toenails are abnormally thick and long. Fungal disease present.     Left foot:      Skin integrity: No erythema.      Toenail Condition: Left toenails are abnormally thick and long. Fungal disease present.  Skin:     Capillary Refill: Capillary " refill takes 2 to 3 seconds.   Neurological:      General: No focal deficit present.      Mental Status: She is alert.   Psychiatric:         Mood and Affect: Mood normal.                                        Assessment:       1. Ulcer of right foot with fat layer exposed    2. Sleep disorder    3. Type II diabetes mellitus with neurological manifestations    4. Comprehensive diabetic foot examination, type 2 DM, encounter for    5. Ingrown nail        Plan:           Patient presents today for follow-up of an ulceration 4th webspace right.  Patient states she is doing a lot better while she still having some pain and discomfort it is no where near as bad as it had been she is taking her Bactrim as directed her culture and sensitivity was positive for E coli.  Patient is still applying the Betadine I want her to continue doing this she is doing it 3 times a day she is using the Silipos toe spacer but because she does not wear socks she is using paper tape to tape it in place so it does not fall out as long as this is not causing irritation or skin I am okay with this.  Patient advised she is to finish the prescription for the Bactrim as prescribed she should have approximately 1 week left she will continue to apply the Betadine using the Silipos toe spacer until follow-up.  Patient will follow up with Dr. Ella Urias at a location closer to her home.  Plan follow-up will be 2 weeks.  Patient advised if for any reason this gets worse she has increased pain discomfort swelling redness or drainage to contact us immediately she did have several ingrowing toenails today that required debridement she also has continued edema of the left foot and ankle so I dispensed the patient a new compression sleeve which she is to wear daily.  Total time including discussion evaluation treatment discussion of treatment options treatment plan continued wound care right and removal of ingrowing toenails equaled 30 minutes.  This  note was created using [employer] voice recognition software that occasionally misinterpreted phrases or words.

## 2022-09-12 ENCOUNTER — OFFICE VISIT (OUTPATIENT)
Dept: PODIATRY | Facility: CLINIC | Age: 87
End: 2022-09-12
Payer: MEDICARE

## 2022-09-12 VITALS
BODY MASS INDEX: 26.68 KG/M2 | HEIGHT: 62 IN | WEIGHT: 145 LBS | SYSTOLIC BLOOD PRESSURE: 98 MMHG | HEART RATE: 66 BPM | DIASTOLIC BLOOD PRESSURE: 54 MMHG | RESPIRATION RATE: 18 BRPM

## 2022-09-12 DIAGNOSIS — L97.511: Primary | ICD-10-CM

## 2022-09-12 DIAGNOSIS — E11.49 TYPE II DIABETES MELLITUS WITH NEUROLOGICAL MANIFESTATIONS: ICD-10-CM

## 2022-09-12 DIAGNOSIS — R60.0 EDEMA OF BOTH LOWER EXTREMITIES: ICD-10-CM

## 2022-09-12 DIAGNOSIS — I73.9 PVD (PERIPHERAL VASCULAR DISEASE): ICD-10-CM

## 2022-09-12 PROCEDURE — 99999 PR PBB SHADOW E&M-EST. PATIENT-LVL IV: ICD-10-PCS | Mod: PBBFAC,,, | Performed by: PODIATRIST

## 2022-09-12 PROCEDURE — 99213 PR OFFICE/OUTPT VISIT, EST, LEVL III, 20-29 MIN: ICD-10-PCS | Mod: S$PBB,,, | Performed by: PODIATRIST

## 2022-09-12 PROCEDURE — 99213 OFFICE O/P EST LOW 20 MIN: CPT | Mod: S$PBB,,, | Performed by: PODIATRIST

## 2022-09-12 PROCEDURE — 99214 OFFICE O/P EST MOD 30 MIN: CPT | Mod: PBBFAC,PN | Performed by: PODIATRIST

## 2022-09-12 PROCEDURE — 99999 PR PBB SHADOW E&M-EST. PATIENT-LVL IV: CPT | Mod: PBBFAC,,, | Performed by: PODIATRIST

## 2022-09-12 NOTE — PROGRESS NOTES
Subjective:       Patient ID: Cinthya Cuevas is a 87 y.o. female.    Chief Complaint: Follow-up, Foot Ulcer, Diabetes Mellitus, Foot Swelling, and Wound Check  Patient presents for follow-up ulcer 4th digit right foot, painful callus on the side of the 5th met head right foot.  Patient relates callus hurts more than the sore between the toes.  She has been applying ointment between the toes, has tried toe sock, not sure there is much improvement.  Relates she is able to reach the area well to apply medication but cannot visualize sore on the side of the toe very well.  She has been wearing wide comfortable slip-on shoes to accommodate for these areas, her usual wide tennis shoes are not comfortable with the callus on the side of the foot.  Reports no significant changes in health or medications.  No change in swelling in feet or legs.  Relates diabetes remains well controlled with daily glucose typically around 120.      Past Medical History:   Diagnosis Date    Anemia     Arthritis     Diabetes mellitus     Encounter for blood transfusion     Impaired mobility      Past Surgical History:   Procedure Laterality Date    APPENDECTOMY      BACK SURGERY      3/2015    CHOLECYSTECTOMY      COLONOSCOPY      EYE SURGERY Bilateral     cataract    FRACTURE SURGERY      right ankle     HYSTERECTOMY      JOINT REPLACEMENT      left hip, right knee     History reviewed. No pertinent family history.  Social History     Socioeconomic History    Marital status:    Tobacco Use    Smoking status: Former     Packs/day: 0.25     Years: 31.00     Pack years: 7.75     Types: Cigarettes     Start date: 1957     Quit date: 1988     Years since quittin.7    Smokeless tobacco: Never   Substance and Sexual Activity    Alcohol use: No    Drug use: No       Current Outpatient Medications   Medication Sig Dispense Refill    atorvastatin (LIPITOR) 40 MG tablet Take 1 tablet (40 mg total) by mouth every evening. For  "cholesterol and diabetes 90 tablet 3    EScitalopram oxalate (LEXAPRO) 10 MG tablet Take 1 and a half tablets daily. 145 tablet 3    FREESTYLE INSULINX Misc       FREESTYLE LANCETS 28 gauge lancets       FREESTYLE LITE STRIPS Strp       metFORMIN (GLUCOPHAGE-XR) 500 MG ER 24hr tablet Take 1 tablet (500 mg total) by mouth every evening. 90 tablet 3    SITagliptin (JANUVIA) 100 MG Tab Take 1 tablet (100 mg total) by mouth once daily. 90 tablet 3    traZODone (DESYREL) 50 MG tablet Take 1 tablet (50 mg total) by mouth nightly as needed for Insomnia. 90 tablet 3    diclofenac sodium (VOLTAREN) 1 % Gel Apply 4 g topically 4 (four) times daily. 450 g 3     No current facility-administered medications for this visit.     Review of patient's allergies indicates:  No Known Allergies      Review of Systems   HENT:  Negative for congestion.    Respiratory:  Negative for cough and shortness of breath.    Cardiovascular:  Positive for leg swelling.   Musculoskeletal:  Positive for gait problem.   All other systems reviewed and are negative.    Objective:      Vitals:    09/12/22 1001   BP: (!) 98/54   Pulse: 66   Resp: 18   Weight: 65.8 kg (145 lb)   Height: 5' 2" (1.575 m)     Physical Exam  Vitals and nursing note reviewed.   Constitutional:       General: She is not in acute distress.     Appearance: Normal appearance.   Cardiovascular:      Pulses:           Dorsalis pedis pulses are 1+ on the right side and 1+ on the left side.        Posterior tibial pulses are 0 on the right side and 0 on the left side.   Pulmonary:      Effort: Pulmonary effort is normal.   Musculoskeletal:      Right lower leg: Edema present.      Left lower leg: Edema present.      Right foot: Decreased range of motion (Arthritic and degenerative changes bilateral feet).      Left foot: Decreased range of motion.   Feet:      Right foot:      Skin integrity: Ulcer (Superficial ulcer with granular base, no tracking or undermining lateral 4th digit right " foot with maceration in the web space.  Clean healthy wound with moisture, no evidence of infection at this time) and callus (Tender callus lateral 5th met head right foot, no discoloration or erythema) present.      Left foot:      Skin integrity: No skin breakdown.   Skin:     Capillary Refill: Capillary refill takes 2 to 3 seconds.   Neurological:      Mental Status: She is alert.   Psychiatric:         Mood and Affect: Mood normal.         Behavior: Behavior normal.         Thought Content: Thought content normal.         Judgment: Judgment normal.                                            Assessment:       1. Skin ulcer of small toe, right, limited to breakdown of skin    2. Type II diabetes mellitus with neurological manifestations    3. PVD (peripheral vascular disease)    4. Edema of both lower extremities        Plan:         Digital picture taken of ulcer lateral 4th digit right foot and showed to patient.  Advised patient this is a superficial ulcer/blister-like and as long as the area is kept dry it should heal without complication.  Instructed patient to discontinue appointment  Area was cleansed with wound , silver alginate and 1 light wrap of Coban applied 4th digit left foot.  Showed patient how to apply this bandage daily and samples were dispensed.  Instructed patient to always remove bandage in the evening, before bed to keep the area clean and dry.  Wound cleanser dispensed to patient  Explained once the area is protected with the bandage utilized any type of powder in the 4th webspace to absorb moisture and decrease friction  Advised patient swelling in her feet has contributed to the cause of pressure in this area developing not only the ulcer but further contributing to the area which is most painful, the callus on the side of the 5th met head right foot.  Area was debrided, no discoloration or complication  Reviewed the need to elevate feet at all times possible.  I encouraged patient  to follow up with PCP regarding swelling which to me appears worse today.  We discussed care and maintenance of skin feet and lower legs, keeping well hydrated and moisturize to prevent complications.  Monitor for any changes of redness, weeping or warmth especially on the front of the legs  We reviewed diabetic education, at high risk for complications  Reviewed need for daily foot checks and instructed patient to contact the office with any area of redness or swelling which has not improved within 3 days.  Patient was in understanding and agreement with treatment plan.  I counseled the patient on their conditions, implications and medical management.  Instructed patient to contact the office with any changes, questions, concerns, worsening of symptoms.   Total face to face time, exam, assessment, treatment, discussion, documentation 20 minutes, more than half this time spent on consultation and coordination of care.   Follow up 2 weeks      This note was created using M*Modal voice recognition software that occasionally misinterpreted phrases or words.

## 2022-09-26 ENCOUNTER — OFFICE VISIT (OUTPATIENT)
Dept: PODIATRY | Facility: CLINIC | Age: 87
End: 2022-09-26
Payer: MEDICARE

## 2022-09-26 VITALS
WEIGHT: 145 LBS | DIASTOLIC BLOOD PRESSURE: 57 MMHG | SYSTOLIC BLOOD PRESSURE: 155 MMHG | BODY MASS INDEX: 26.68 KG/M2 | HEIGHT: 62 IN | RESPIRATION RATE: 16 BRPM | HEART RATE: 73 BPM

## 2022-09-26 DIAGNOSIS — R60.0 EDEMA OF BOTH LOWER EXTREMITIES: ICD-10-CM

## 2022-09-26 DIAGNOSIS — L97.511: Primary | ICD-10-CM

## 2022-09-26 DIAGNOSIS — E11.49 TYPE II DIABETES MELLITUS WITH NEUROLOGICAL MANIFESTATIONS: ICD-10-CM

## 2022-09-26 DIAGNOSIS — I73.9 PVD (PERIPHERAL VASCULAR DISEASE): ICD-10-CM

## 2022-09-26 PROCEDURE — 99214 OFFICE O/P EST MOD 30 MIN: CPT | Mod: PBBFAC,PN | Performed by: PODIATRIST

## 2022-09-26 PROCEDURE — 99999 PR PBB SHADOW E&M-EST. PATIENT-LVL IV: CPT | Mod: PBBFAC,,, | Performed by: PODIATRIST

## 2022-09-26 PROCEDURE — 99213 PR OFFICE/OUTPT VISIT, EST, LEVL III, 20-29 MIN: ICD-10-PCS | Mod: S$PBB,,, | Performed by: PODIATRIST

## 2022-09-26 PROCEDURE — 99999 PR PBB SHADOW E&M-EST. PATIENT-LVL IV: ICD-10-PCS | Mod: PBBFAC,,, | Performed by: PODIATRIST

## 2022-09-26 PROCEDURE — 99213 OFFICE O/P EST LOW 20 MIN: CPT | Mod: S$PBB,,, | Performed by: PODIATRIST

## 2022-09-26 NOTE — PROGRESS NOTES
Subjective:       Patient ID: Cinthya Cuevas is a 87 y.o. female.    Chief Complaint: Follow-up, Skin Ulcer, Edema, and Diabetes Mellitus  Patient presents for follow-up ulcer 4th digit right foot.  Patient relates she has been applying silver alginate and bandage during the day, applying baby powder to the webspace, toe spacer at night.  She cannot see it but can feel the sore is still present, no pain and no redness to the top of the foot.      Past Medical History:   Diagnosis Date    Anemia     Arthritis     Diabetes mellitus     Encounter for blood transfusion     Impaired mobility      Past Surgical History:   Procedure Laterality Date    APPENDECTOMY      BACK SURGERY      3/2015    CHOLECYSTECTOMY      COLONOSCOPY      EYE SURGERY Bilateral     cataract    FRACTURE SURGERY      right ankle     HYSTERECTOMY      JOINT REPLACEMENT      left hip, right knee     History reviewed. No pertinent family history.  Social History     Socioeconomic History    Marital status:    Tobacco Use    Smoking status: Former     Packs/day: 0.25     Years: 31.00     Pack years: 7.75     Types: Cigarettes     Start date: 1957     Quit date: 1988     Years since quittin.7    Smokeless tobacco: Never   Substance and Sexual Activity    Alcohol use: No    Drug use: No       Current Outpatient Medications   Medication Sig Dispense Refill    atorvastatin (LIPITOR) 40 MG tablet Take 1 tablet (40 mg total) by mouth every evening. For cholesterol and diabetes 90 tablet 3    EScitalopram oxalate (LEXAPRO) 10 MG tablet Take 1 and a half tablets daily. 145 tablet 3    FREESTYLE INSULINX Misc       FREESTYLE LANCETS 28 gauge lancets       FREESTYLE LITE STRIPS Strp       metFORMIN (GLUCOPHAGE-XR) 500 MG ER 24hr tablet Take 1 tablet (500 mg total) by mouth every evening. 90 tablet 3    SITagliptin (JANUVIA) 100 MG Tab Take 1 tablet (100 mg total) by mouth once daily. 90 tablet 3    traZODone (DESYREL) 50 MG tablet  "Take 1 tablet (50 mg total) by mouth nightly as needed for Insomnia. 90 tablet 3    diclofenac sodium (VOLTAREN) 1 % Gel Apply 4 g topically 4 (four) times daily. 450 g 3     No current facility-administered medications for this visit.     Review of patient's allergies indicates:  No Known Allergies      Review of Systems   HENT:  Negative for congestion.         Band-Aid nose, Dermatology removed lesion recently   Respiratory:  Negative for cough.    Cardiovascular:  Positive for leg swelling.   Musculoskeletal:  Positive for gait problem.        Walker   All other systems reviewed and are negative.    Objective:      Vitals:    09/26/22 0847   BP: (!) 155/57   Pulse: 73   Resp: 16   Weight: 65.8 kg (145 lb)   Height: 5' 2" (1.575 m)     Physical Exam  Vitals and nursing note reviewed.   Constitutional:       General: She is not in acute distress.  Cardiovascular:      Pulses:           Dorsalis pedis pulses are 1+ on the right side and 1+ on the left side.        Posterior tibial pulses are 0 on the right side and 0 on the left side.   Pulmonary:      Effort: Pulmonary effort is normal.   Musculoskeletal:      Right lower leg: Edema present.      Left lower leg: Edema present.      Right foot: Decreased range of motion (Arthritic and degenerative changes bilateral feet).      Left foot: Decreased range of motion.   Feet:      Right foot:      Skin integrity: Ulcer (ulcer with granular base, no tracking or undermining lateral 4th digit right foot, dry, clean healthy, no evidence of infection) present.      Left foot:      Skin integrity: No skin breakdown.   Skin:     Capillary Refill: Capillary refill takes 2 to 3 seconds.   Neurological:      Mental Status: She is alert.   Psychiatric:         Mood and Affect: Mood normal.         Behavior: Behavior normal.         Thought Content: Thought content normal.         Judgment: Judgment normal.                                          Assessment:       1. Skin ulcer " of small toe, right, limited to breakdown of skin    2. Type II diabetes mellitus with neurological manifestations    3. PVD (peripheral vascular disease)    4. Edema of both lower extremities        Plan:         Digital picture taken of ulcer lateral 4th digit right foot and showed to patient.    Advised moisture has resolved, continue to keep the area clean and dry, healing may be slow, but as long as it stays dry should start to decrease in size.  Instructed patient to apply iodine in the morning, let it thoroughly dry then apply silver alginate and light gauze bandage.  Apply powder in the webspace after the bandage is applied.  Discontinue use of toe spacer at night.  At night she is to apply iodine again and allow to thoroughly dry, leave open to air out.  Pointed out to patient swelling has improved especially in her feet, this is not only allow the callus on the 5th toe to heal but that there is a small space between the 4th and 5th digits to allow air to passed through that was not there previously.  Explained as long as the swelling continues to improve there will be less rubbing in this location  Instructed to continue dressing change daily, avoid getting wet in the shower, no ointment  Monitor for any changes and contact the office immediately if there is any redness on the top of the foot or toe or she starts to experience pain  Wound care today  Area was cleansed with wound , iodine, silver alginate light Coban applied   We reviewed diabetic education, at high risk for complications  Reviewed need for daily foot checks and instructed patient to contact the office with any area of redness or swelling which has not improved within 3 days.  Patient was in understanding and agreement with treatment plan.  I counseled the patient on their conditions, implications and medical management.  Instructed patient to contact the office with any changes, questions, concerns, worsening of symptoms.   Total  face to face time, exam, assessment, treatment, discussion, documentation 20 minutes, more than half this time spent on consultation and coordination of care.   Follow up 2 weeks      This note was created using M*[employer] voice recognition software that occasionally misinterpreted phrases or words.

## 2022-10-10 DIAGNOSIS — E11.49 TYPE II DIABETES MELLITUS WITH NEUROLOGICAL MANIFESTATIONS: ICD-10-CM

## 2022-10-10 DIAGNOSIS — F43.29 GRIEF REACTION WITH PROLONGED BEREAVEMENT: ICD-10-CM

## 2022-10-10 RX ORDER — ATORVASTATIN CALCIUM 40 MG/1
40 TABLET, FILM COATED ORAL NIGHTLY
Qty: 90 TABLET | Refills: 3 | Status: SHIPPED | OUTPATIENT
Start: 2022-10-10 | End: 2022-11-09

## 2022-10-10 RX ORDER — ESCITALOPRAM OXALATE 10 MG/1
TABLET ORAL
Qty: 145 TABLET | Refills: 3 | Status: SHIPPED | OUTPATIENT
Start: 2022-10-10 | End: 2022-11-09

## 2022-10-10 RX ORDER — METFORMIN HYDROCHLORIDE 500 MG/1
500 TABLET, EXTENDED RELEASE ORAL NIGHTLY
Qty: 90 TABLET | Refills: 3 | Status: SHIPPED | OUTPATIENT
Start: 2022-10-10 | End: 2022-11-09 | Stop reason: SDUPTHER

## 2022-10-12 ENCOUNTER — OFFICE VISIT (OUTPATIENT)
Dept: PODIATRY | Facility: CLINIC | Age: 87
End: 2022-10-12
Payer: MEDICARE

## 2022-10-12 VITALS
HEIGHT: 62 IN | HEART RATE: 64 BPM | DIASTOLIC BLOOD PRESSURE: 65 MMHG | RESPIRATION RATE: 16 BRPM | WEIGHT: 145 LBS | BODY MASS INDEX: 26.68 KG/M2 | SYSTOLIC BLOOD PRESSURE: 110 MMHG

## 2022-10-12 DIAGNOSIS — L97.511: Primary | ICD-10-CM

## 2022-10-12 DIAGNOSIS — E11.49 TYPE II DIABETES MELLITUS WITH NEUROLOGICAL MANIFESTATIONS: ICD-10-CM

## 2022-10-12 DIAGNOSIS — R60.0 EDEMA OF BOTH LOWER EXTREMITIES: ICD-10-CM

## 2022-10-12 DIAGNOSIS — I73.9 PVD (PERIPHERAL VASCULAR DISEASE): ICD-10-CM

## 2022-10-12 PROCEDURE — 99999 PR PBB SHADOW E&M-EST. PATIENT-LVL V: CPT | Mod: PBBFAC,,, | Performed by: PODIATRIST

## 2022-10-12 PROCEDURE — 99213 OFFICE O/P EST LOW 20 MIN: CPT | Mod: S$PBB,,, | Performed by: PODIATRIST

## 2022-10-12 PROCEDURE — 99215 OFFICE O/P EST HI 40 MIN: CPT | Mod: PBBFAC,PN | Performed by: PODIATRIST

## 2022-10-12 PROCEDURE — 99999 PR PBB SHADOW E&M-EST. PATIENT-LVL V: ICD-10-PCS | Mod: PBBFAC,,, | Performed by: PODIATRIST

## 2022-10-12 PROCEDURE — 99213 PR OFFICE/OUTPT VISIT, EST, LEVL III, 20-29 MIN: ICD-10-PCS | Mod: S$PBB,,, | Performed by: PODIATRIST

## 2022-10-12 RX ORDER — METFORMIN HYDROCHLORIDE 500 MG/1
500 TABLET ORAL 2 TIMES DAILY WITH MEALS
COMMUNITY
End: 2022-11-09 | Stop reason: SDUPTHER

## 2022-10-12 RX ORDER — AMITRIPTYLINE HYDROCHLORIDE 10 MG/1
TABLET, FILM COATED ORAL
COMMUNITY
End: 2023-05-08

## 2022-10-12 RX ORDER — SULFAMETHOXAZOLE AND TRIMETHOPRIM 800; 160 MG/1; MG/1
TABLET ORAL
COMMUNITY
End: 2022-10-12

## 2022-10-12 NOTE — PROGRESS NOTES
Subjective:       Patient ID: Cinthya Cuevas is a 87 y.o. female.    Chief Complaint: Follow-up, Skin Ulcer, Edema, and Diabetes Mellitus  Patient presents for follow-up ulcer 4th digit right foot.  She feels area is improving she has not had any further redness or peeling skin in the webspace.  She is applying silver alginate between the toes during the day, applying iodine at night, leaving it uncovered when sleeping but using silopost toe spacer.  Reports less pain, will experience discomfort on and off.  This morning there is no pain.  Is wearing wide comfortable shoes.  Swelling slightly improved this morning but always present.      Past Medical History:   Diagnosis Date    Anemia     Arthritis     Diabetes mellitus     Encounter for blood transfusion     Impaired mobility      Past Surgical History:   Procedure Laterality Date    APPENDECTOMY      BACK SURGERY      3/2015    CHOLECYSTECTOMY      COLONOSCOPY      EYE SURGERY Bilateral     cataract    FRACTURE SURGERY      right ankle     HYSTERECTOMY      JOINT REPLACEMENT      left hip, right knee     History reviewed. No pertinent family history.  Social History     Socioeconomic History    Marital status:    Tobacco Use    Smoking status: Former     Packs/day: 0.25     Years: 31.00     Pack years: 7.75     Types: Cigarettes     Start date: 1957     Quit date: 1988     Years since quittin.8    Smokeless tobacco: Never   Substance and Sexual Activity    Alcohol use: No    Drug use: No       Current Outpatient Medications   Medication Sig Dispense Refill    atorvastatin (LIPITOR) 40 MG tablet Take 1 tablet (40 mg total) by mouth every evening. For cholesterol and diabetes 90 tablet 3    EScitalopram oxalate (LEXAPRO) 10 MG tablet Take 1 and a half tablets daily. 145 tablet 3    FREESTYLE INSULINX Misc       FREESTYLE LANCETS 28 gauge lancets       FREESTYLE LITE STRIPS Strp       metFORMIN (GLUCOPHAGE) 500 MG tablet metformin 500  "mg tablet      metFORMIN (GLUCOPHAGE-XR) 500 MG ER 24hr tablet Take 1 tablet (500 mg total) by mouth every evening. 90 tablet 3    SITagliptin (JANUVIA) 100 MG Tab Take 1 tablet (100 mg total) by mouth once daily. 90 tablet 3    traZODone (DESYREL) 50 MG tablet Take 1 tablet (50 mg total) by mouth nightly as needed for Insomnia. 90 tablet 3    amitriptyline (ELAVIL) 10 MG tablet amitriptyline 10 mg tablet      diclofenac sodium (VOLTAREN) 1 % Gel Apply 4 g topically 4 (four) times daily. 450 g 3     No current facility-administered medications for this visit.     Review of patient's allergies indicates:  No Known Allergies      Review of Systems   HENT:  Negative for congestion.         Band-Aid nose, Dermatology removed lesion recently   Respiratory:  Negative for cough.    Cardiovascular:  Positive for leg swelling.   Musculoskeletal:  Positive for gait problem.        Walker   All other systems reviewed and are negative.    Objective:      Vitals:    10/12/22 0912 10/12/22 0920   BP: (!) 179/68 110/65   Pulse: 67 64   Resp: 16    Weight: 65.8 kg (145 lb)    Height: 5' 2" (1.575 m)      Physical Exam  Vitals and nursing note reviewed.   Constitutional:       General: She is not in acute distress.     Appearance: Normal appearance.   Cardiovascular:      Pulses:           Dorsalis pedis pulses are 1+ on the right side and 1+ on the left side.        Posterior tibial pulses are 0 on the right side and 0 on the left side.   Pulmonary:      Effort: Pulmonary effort is normal.   Musculoskeletal:      Right lower leg: Edema present.      Left lower leg: Edema present.      Right foot: Decreased range of motion (Arthritic and degenerative changes bilateral feet).      Left foot: Decreased range of motion.   Feet:      Right foot:      Skin integrity: Ulcer (smaller ulcer with granular base, no tracking or undermining lateral 4th digit right foot, clean, no evidence of infection) present. No erythema.      Left foot:      " Skin integrity: No erythema.   Skin:     Capillary Refill: Capillary refill takes 2 to 3 seconds.   Neurological:      Mental Status: She is alert.   Psychiatric:         Mood and Affect: Mood normal.         Behavior: Behavior normal.         Thought Content: Thought content normal.         Judgment: Judgment normal.                          Assessment:       1. Skin ulcer of small toe, right, limited to breakdown of skin    2. Type II diabetes mellitus with neurological manifestations    3. Edema of both lower extremities    4. PVD (peripheral vascular disease)          Plan:           Advised patient ulcer is smaller, all irritated skin dry skin between the toes is completely resolved  Instructed to continue silver alginate during the day, showed patient how to use gauze between the toes at night.  Advised silicone material of the toe spacer may still be causing some additional moisture E even though she is just wearing it when sleeping.  Prefer a soft dry pad between the toes  Explained to patient rubbing between the toes is directly associated to the amount of swelling in her feet.  Encouraged patient to keep feet elevated in use light compression socks daily  Keep area clean and dry, no soaking, no ointment  We did review risk for infection.  Patient understands as well as there is an open wound present she is at risk for infection  Wound care today. Area was cleansed with wound , iodine, silver alginate light Coban applied. Samples dispensed   Reviewed diabetic education, at high risk for complications  Reviewed need for daily foot checks and instructed patient to contact the office with any area of redness or swelling which has not improved within 3 days.  Patient was in understanding and agreement with treatment plan.  I counseled the patient on their conditions, implications and medical management.  Instructed patient to contact the office with any changes, questions, concerns, worsening of  symptoms.   Total face to face time, exam, assessment, treatment, discussion, documentation 20 minutes, more than half this time spent on consultation and coordination of care.   Follow up 4 weeks      This note was created using [employer] voice recognition software that occasionally misinterpreted phrases or words.

## 2022-11-09 ENCOUNTER — OFFICE VISIT (OUTPATIENT)
Dept: FAMILY MEDICINE | Facility: CLINIC | Age: 87
End: 2022-11-09
Payer: MEDICARE

## 2022-11-09 ENCOUNTER — OFFICE VISIT (OUTPATIENT)
Dept: PODIATRY | Facility: CLINIC | Age: 87
End: 2022-11-09
Payer: MEDICARE

## 2022-11-09 ENCOUNTER — LAB VISIT (OUTPATIENT)
Dept: FAMILY MEDICINE | Facility: CLINIC | Age: 87
End: 2022-11-09
Payer: MEDICARE

## 2022-11-09 VITALS
DIASTOLIC BLOOD PRESSURE: 60 MMHG | RESPIRATION RATE: 16 BRPM | HEART RATE: 60 BPM | BODY MASS INDEX: 25.95 KG/M2 | SYSTOLIC BLOOD PRESSURE: 164 MMHG | WEIGHT: 141 LBS | HEIGHT: 62 IN

## 2022-11-09 VITALS
HEART RATE: 60 BPM | DIASTOLIC BLOOD PRESSURE: 76 MMHG | SYSTOLIC BLOOD PRESSURE: 138 MMHG | WEIGHT: 141.13 LBS | OXYGEN SATURATION: 99 % | BODY MASS INDEX: 25.97 KG/M2 | HEIGHT: 62 IN | TEMPERATURE: 98 F

## 2022-11-09 DIAGNOSIS — E11.49 TYPE II DIABETES MELLITUS WITH NEUROLOGICAL MANIFESTATIONS: Primary | ICD-10-CM

## 2022-11-09 DIAGNOSIS — R60.0 EDEMA OF BOTH LOWER EXTREMITIES: ICD-10-CM

## 2022-11-09 DIAGNOSIS — R35.1 NOCTURIA: ICD-10-CM

## 2022-11-09 DIAGNOSIS — N32.81 OVERACTIVE BLADDER: ICD-10-CM

## 2022-11-09 DIAGNOSIS — E53.8 LOW SERUM VITAMIN B12: ICD-10-CM

## 2022-11-09 DIAGNOSIS — M20.12 HALLUX ABDUCTO VALGUS, BILATERAL: ICD-10-CM

## 2022-11-09 DIAGNOSIS — E11.49 TYPE II DIABETES MELLITUS WITH NEUROLOGICAL MANIFESTATIONS: ICD-10-CM

## 2022-11-09 DIAGNOSIS — M20.11 HALLUX ABDUCTO VALGUS, BILATERAL: ICD-10-CM

## 2022-11-09 DIAGNOSIS — M20.61 ACQUIRED DEFORMITY OF TOE, RIGHT: Primary | ICD-10-CM

## 2022-11-09 DIAGNOSIS — G47.9 SLEEP DISORDER: ICD-10-CM

## 2022-11-09 DIAGNOSIS — L84 FOOT CALLUS: ICD-10-CM

## 2022-11-09 LAB
ALBUMIN SERPL BCP-MCNC: 4.1 G/DL (ref 3.5–5.2)
ALBUMIN/CREAT UR: 687.5 UG/MG (ref 0–30)
ALP SERPL-CCNC: 83 U/L (ref 55–135)
ALT SERPL W/O P-5'-P-CCNC: 18 U/L (ref 10–44)
ANION GAP SERPL CALC-SCNC: 14 MMOL/L (ref 8–16)
AST SERPL-CCNC: 19 U/L (ref 10–40)
BACTERIA #/AREA URNS HPF: ABNORMAL /HPF
BILIRUB SERPL-MCNC: 0.5 MG/DL (ref 0.1–1)
BILIRUB SERPL-MCNC: NEGATIVE MG/DL
BILIRUB UR QL STRIP: NEGATIVE
BLOOD URINE, POC: NORMAL
BUN SERPL-MCNC: 38 MG/DL (ref 8–23)
CALCIUM SERPL-MCNC: 9.7 MG/DL (ref 8.7–10.5)
CHLORIDE SERPL-SCNC: 102 MMOL/L (ref 95–110)
CLARITY UR: CLEAR
CLARITY, POC UA: CLEAR
CO2 SERPL-SCNC: 24 MMOL/L (ref 23–29)
COLOR UR: YELLOW
COLOR, POC UA: YELLOW
CREAT SERPL-MCNC: 1.1 MG/DL (ref 0.5–1.4)
CREAT UR-MCNC: 56 MG/DL (ref 15–325)
EST. GFR  (NO RACE VARIABLE): 48.6 ML/MIN/1.73 M^2
ESTIMATED AVG GLUCOSE: 123 MG/DL (ref 68–131)
GLUCOSE SERPL-MCNC: 97 MG/DL (ref 70–110)
GLUCOSE UR QL STRIP: NEGATIVE
GLUCOSE UR QL STRIP: NEGATIVE
HBA1C MFR BLD: 5.9 % (ref 4–5.6)
HGB UR QL STRIP: NEGATIVE
HYALINE CASTS #/AREA URNS LPF: 0 /LPF
KETONES UR QL STRIP: NEGATIVE
KETONES UR QL STRIP: NEGATIVE
LEUKOCYTE ESTERASE UR QL STRIP: NEGATIVE
LEUKOCYTE ESTERASE URINE, POC: NEGATIVE
MICROALBUMIN UR DL<=1MG/L-MCNC: 385 UG/ML
MICROSCOPIC COMMENT: ABNORMAL
NITRITE UR QL STRIP: NEGATIVE
NITRITE, POC UA: NEGATIVE
PH UR STRIP: 5 [PH] (ref 5–8)
PH, POC UA: 5
POTASSIUM SERPL-SCNC: 4.3 MMOL/L (ref 3.5–5.1)
PROT SERPL-MCNC: 7.7 G/DL (ref 6–8.4)
PROT UR QL STRIP: ABNORMAL
PROTEIN, POC: 100
RBC #/AREA URNS HPF: 3 /HPF (ref 0–4)
SODIUM SERPL-SCNC: 140 MMOL/L (ref 136–145)
SP GR UR STRIP: 1.02 (ref 1–1.03)
SPECIFIC GRAVITY, POC UA: 1.02
SQUAMOUS #/AREA URNS HPF: 3 /HPF
URN SPEC COLLECT METH UR: ABNORMAL
UROBILINOGEN UR STRIP-ACNC: NEGATIVE EU/DL
UROBILINOGEN, POC UA: 0.2
WBC #/AREA URNS HPF: 4 /HPF (ref 0–5)

## 2022-11-09 PROCEDURE — 99999 PR PBB SHADOW E&M-EST. PATIENT-LVL V: ICD-10-PCS | Mod: PBBFAC,,, | Performed by: PODIATRIST

## 2022-11-09 PROCEDURE — 80053 COMPREHEN METABOLIC PANEL: CPT | Performed by: FAMILY MEDICINE

## 2022-11-09 PROCEDURE — 99213 PR OFFICE/OUTPT VISIT, EST, LEVL III, 20-29 MIN: ICD-10-PCS | Mod: S$PBB,,, | Performed by: PODIATRIST

## 2022-11-09 PROCEDURE — 81002 URINALYSIS NONAUTO W/O SCOPE: CPT | Mod: PBBFAC,PN | Performed by: FAMILY MEDICINE

## 2022-11-09 PROCEDURE — 83036 HEMOGLOBIN GLYCOSYLATED A1C: CPT | Performed by: FAMILY MEDICINE

## 2022-11-09 PROCEDURE — 82043 UR ALBUMIN QUANTITATIVE: CPT | Performed by: FAMILY MEDICINE

## 2022-11-09 PROCEDURE — 99213 OFFICE O/P EST LOW 20 MIN: CPT | Mod: S$PBB,,, | Performed by: PODIATRIST

## 2022-11-09 PROCEDURE — 99214 OFFICE O/P EST MOD 30 MIN: CPT | Mod: S$PBB,,, | Performed by: FAMILY MEDICINE

## 2022-11-09 PROCEDURE — 99999 PR PBB SHADOW E&M-EST. PATIENT-LVL IV: ICD-10-PCS | Mod: PBBFAC,,, | Performed by: FAMILY MEDICINE

## 2022-11-09 PROCEDURE — 82570 ASSAY OF URINE CREATININE: CPT | Performed by: FAMILY MEDICINE

## 2022-11-09 PROCEDURE — 99215 OFFICE O/P EST HI 40 MIN: CPT | Mod: PBBFAC,PN | Performed by: PODIATRIST

## 2022-11-09 PROCEDURE — 99999 PR PBB SHADOW E&M-EST. PATIENT-LVL IV: CPT | Mod: PBBFAC,,, | Performed by: FAMILY MEDICINE

## 2022-11-09 PROCEDURE — 99214 OFFICE O/P EST MOD 30 MIN: CPT | Mod: PBBFAC,27,PN | Performed by: FAMILY MEDICINE

## 2022-11-09 PROCEDURE — 99214 PR OFFICE/OUTPT VISIT, EST, LEVL IV, 30-39 MIN: ICD-10-PCS | Mod: S$PBB,,, | Performed by: FAMILY MEDICINE

## 2022-11-09 PROCEDURE — 81000 URINALYSIS NONAUTO W/SCOPE: CPT | Performed by: FAMILY MEDICINE

## 2022-11-09 PROCEDURE — 99999 PR PBB SHADOW E&M-EST. PATIENT-LVL V: CPT | Mod: PBBFAC,,, | Performed by: PODIATRIST

## 2022-11-09 RX ORDER — ESCITALOPRAM OXALATE 10 MG/1
TABLET ORAL
COMMUNITY
Start: 2022-06-17 | End: 2022-11-09

## 2022-11-09 RX ORDER — ESCITALOPRAM OXALATE 10 MG/1
15 TABLET ORAL
COMMUNITY
Start: 2022-10-10 | End: 2022-11-09 | Stop reason: SDUPTHER

## 2022-11-09 RX ORDER — ATORVASTATIN CALCIUM 40 MG/1
TABLET, FILM COATED ORAL
COMMUNITY
Start: 2022-10-10 | End: 2022-11-09 | Stop reason: SDUPTHER

## 2022-11-09 RX ORDER — DICLOFENAC SODIUM 10 MG/G
GEL TOPICAL
COMMUNITY
Start: 2022-06-17 | End: 2023-06-16

## 2022-11-09 RX ORDER — OXYBUTYNIN CHLORIDE 5 MG/1
5 TABLET ORAL NIGHTLY
Qty: 90 TABLET | Refills: 3 | Status: SHIPPED | OUTPATIENT
Start: 2022-11-09 | End: 2023-01-27 | Stop reason: SDUPTHER

## 2022-11-09 RX ORDER — TRAZODONE HYDROCHLORIDE 50 MG/1
TABLET ORAL
COMMUNITY
Start: 2022-03-17 | End: 2023-01-11 | Stop reason: SDUPTHER

## 2022-11-09 RX ORDER — METFORMIN HYDROCHLORIDE 500 MG/1
TABLET, FILM COATED, EXTENDED RELEASE ORAL
COMMUNITY
Start: 2022-06-17 | End: 2022-11-09 | Stop reason: SDUPTHER

## 2022-11-09 RX ORDER — ATORVASTATIN CALCIUM 40 MG/1
TABLET, FILM COATED ORAL
COMMUNITY
Start: 2022-06-17 | End: 2022-11-09 | Stop reason: SDUPTHER

## 2022-11-09 RX ORDER — METFORMIN HYDROCHLORIDE EXTENDED-RELEASE TABLETS 500 MG/1
TABLET, FILM COATED, EXTENDED RELEASE ORAL
COMMUNITY
Start: 2022-10-10 | End: 2022-11-09 | Stop reason: SDUPTHER

## 2022-11-09 NOTE — PROGRESS NOTES
Subjective:       Patient ID: Cinthya Cuveas is a 87 y.o. female.    Chief Complaint: Follow-up    Saw Dr. Piper a couple of weeks ago and had two injections, in R shoulder and lower back.    Nocturia several times espright after she lies down in bed. No large suppers, drinks only sip of water with nighttime meds.    Back in regular shoe after being treated for over a year for diabetic foot ulcer.    Depression Patient Health Questionnaire 11/9/2022 8/17/2021   Over the last two weeks how often have you been bothered by little interest or pleasure in doing things Not at all Not at all   Over the last two weeks how often have you been bothered by feeling down, depressed or hopeless Not at all Not at all   PHQ-2 Total Score 0 0       Review of Systems   Genitourinary:  Positive for urgency. Negative for decreased urine volume, dysuria, flank pain, hematuria, pelvic pain, vaginal bleeding, vaginal discharge and vaginal pain.   All other systems reviewed and are negative.      Past Medical History:   Diagnosis Date    Anemia     Arthritis     Diabetes mellitus     Encounter for blood transfusion     Impaired mobility      Past Surgical History:   Procedure Laterality Date    APPENDECTOMY      BACK SURGERY      3/2015    CHOLECYSTECTOMY      COLONOSCOPY      EYE SURGERY Bilateral     cataract    FRACTURE SURGERY      right ankle 2000    HYSTERECTOMY      JOINT REPLACEMENT      left hip, right knee     History reviewed. No pertinent family history.    Review of patient's allergies indicates:  No Known Allergies    Current Outpatient Medications:     amitriptyline (ELAVIL) 10 MG tablet, amitriptyline 10 mg tablet, Disp: , Rfl:     diclofenac sodium (VOLTAREN) 1 % Gel,  APPLY 4 GRAMS TOPICALLY 4 (FOUR) TIMES DAILY., # 400 g, 3 total refill(s), Acute, [facility] [Federal Rx: #400 last filled 06/22/22], Disp: , Rfl:     FREESTYLE INSULINX Misc, , Disp: , Rfl:     FREESTYLE LANCETS 28 gauge lancets, , Disp: , Rfl:      "FREESTYLE LITE STRIPS Strp, , Disp: , Rfl:     SITagliptin (JANUVIA) 100 MG Tab, Take 1 tablet (100 mg total) by mouth once daily., Disp: 90 tablet, Rfl: 3    traZODone (DESYREL) 50 MG tablet,  TAKE 1 TABLET (50 MG TOTAL) BY MOUTH NIGHTLY AS NEEDED FOR INSOMNIA., # 90 EA, 3 total refill(s), Acute, [facility] [Federal Rx: #90 last filled 03/23/22], Disp: , Rfl:     oxybutynin (DITROPAN) 5 MG Tab, Take 1 tablet (5 mg total) by mouth nightly., Disp: 90 tablet, Rfl: 3      Objective:      /76 (BP Location: Left arm, Patient Position: Sitting, BP Method: Medium (Manual))   Pulse 60   Temp 97.8 °F (36.6 °C) (Tympanic)   Ht 5' 2" (1.575 m)   Wt 64 kg (141 lb 1.5 oz)   SpO2 99%   BMI 25.81 kg/m²   Physical Exam  Vitals and nursing note reviewed.   Constitutional:       General: She is not in acute distress.     Appearance: Normal appearance. She is well-developed and normal weight. She is not ill-appearing, toxic-appearing or diaphoretic.   HENT:      Head: Normocephalic and atraumatic.      Ears:      Comments: Hearing aid L ear, and had a [place] implanted R ear ("made by the cochlear implant folks")   Eyes:      General: No scleral icterus.        Right eye: No discharge.         Left eye: No discharge.      Extraocular Movements: Extraocular movements intact.      Conjunctiva/sclera: Conjunctivae normal.   Neck:      Thyroid: No thyromegaly.      Vascular: No JVD.      Trachea: No tracheal deviation.   Cardiovascular:      Rate and Rhythm: Normal rate and regular rhythm.      Pulses: Normal pulses.      Heart sounds: Normal heart sounds. No murmur heard.    No friction rub. No gallop.   Pulmonary:      Effort: Pulmonary effort is normal. No respiratory distress.      Breath sounds: Normal breath sounds. No wheezing, rhonchi or rales.   Abdominal:      General: Abdomen is flat. Bowel sounds are normal. There is no distension.      Palpations: Abdomen is soft. There is no mass.      Tenderness: There is no abdominal " tenderness. There is no right CVA tenderness, left CVA tenderness, guarding or rebound.   Musculoskeletal:         General: Normal range of motion.      Cervical back: Normal range of motion and neck supple. No tenderness.      Right lower leg: No edema.      Left lower leg: No edema.   Lymphadenopathy:      Cervical: No cervical adenopathy.   Skin:     General: Skin is warm and dry.      Capillary Refill: Capillary refill takes less than 2 seconds.      Coloration: Skin is not jaundiced or pale.      Findings: No erythema or rash.   Neurological:      General: No focal deficit present.      Mental Status: She is alert and oriented to person, place, and time. Mental status is at baseline.      Motor: No weakness.      Coordination: Coordination normal.      Gait: Gait normal.   Psychiatric:         Mood and Affect: Mood normal.         Behavior: Behavior normal.         Thought Content: Thought content normal.         Judgment: Judgment normal.       Assessment:       1. Type II diabetes mellitus with neurological manifestations    2. Nocturia    3. Overactive bladder    4. Low serum vitamin B12    5. Sleep disorder        Plan:       Type II diabetes mellitus with neurological manifestations  -     Comprehensive Metabolic Panel; Future; Expected date: 11/09/2022  -     Hemoglobin A1C; Future; Expected date: 11/09/2022  -     Microalbumin/Creatinine Ratio, Urine; Future; Expected date: 11/09/2022  -     Urinalysis Microscopic    Nocturia  -     Urinalysis  -     Urine culture; Future; Expected date: 01/08/2024  -     POCT URINE DIPSTICK WITHOUT MICROSCOPE  -     oxybutynin (DITROPAN) 5 MG Tab; Take 1 tablet (5 mg total) by mouth nightly.  Dispense: 90 tablet; Refill: 3    Overactive bladder  -     oxybutynin (DITROPAN) 5 MG Tab; Take 1 tablet (5 mg total) by mouth nightly.  Dispense: 90 tablet; Refill: 3  -     Urinalysis Microscopic    Low serum vitamin B12    Sleep disorder      Continue monthly B12  injections  Trial of oxybutynin for help with bladder symptoms.  No UTI suspected based on POCT urine, will send out for micro and culture.  No further med changes today.        Previous records in Epic were reviewed, including the last 3 months of encounters, imaging, laboratory, and pathology reports.    Strict return precautions reviewed and patient verbalized understanding. Risks, benefits, and alternatives to the plan were reviewed in detail and all questions answered to the patient's satisfaction. Patient agrees to return to clinic or ER if symptoms worsen. 30 minutes total were spent on today's visit, not limited to but including time based on counseling and coordination of care.    Patient instructed that best way to communicate with my office staff is for patient to get on the [facility] patient portal to expedite communication and communication issues that may occur.  Patient was given instructions on how to get on the portal.  I encouraged patient to obtain portal access as well.  Ultimately it is up to the patient to obtain access.  Patient voiced understanding.    This note was created using [employer] mobile [employer] voice recognition software that occasionally may misinterpret phrases or words.    Follow up in about 6 weeks (around 12/21/2022) for follow up bladder symptoms, new med.

## 2022-11-10 NOTE — PROGRESS NOTES
Subjective:       Patient ID: Cinthya Cuevas is a 87 y.o. female.    Chief Complaint: Follow-up, Foot Ulcer, and Diabetes Mellitus  Patient presents for follow-up ulcer 4th digit right foot.  Patient states she thinks the areas completely healed.  She is not able to visualize it but swelling of the toe and pain has resolved.  She can feel a small scab or callus over the area.  Applying iodine few times a week has a small 2 x 2 gauze between the toes almost at all times day night.  States she was able to wear pair shoes today she has not been able to wear in over a year.  She states ulcer has been present on the toe for about a year, started when she was living in [place].  No pain today      Past Medical History:   Diagnosis Date    Anemia     Arthritis     Diabetes mellitus     Encounter for blood transfusion     Impaired mobility      Past Surgical History:   Procedure Laterality Date    APPENDECTOMY      BACK SURGERY      3/2015    CHOLECYSTECTOMY      COLONOSCOPY      EYE SURGERY Bilateral     cataract    FRACTURE SURGERY      right ankle     HYSTERECTOMY      JOINT REPLACEMENT      left hip, right knee     History reviewed. No pertinent family history.  Social History     Socioeconomic History    Marital status:    Tobacco Use    Smoking status: Former     Packs/day: 0.25     Years: 31.00     Pack years: 7.75     Types: Cigarettes     Start date: 1957     Quit date: 1988     Years since quittin.8    Smokeless tobacco: Never   Substance and Sexual Activity    Alcohol use: No    Drug use: No       Current Outpatient Medications   Medication Sig Dispense Refill    amitriptyline (ELAVIL) 10 MG tablet amitriptyline 10 mg tablet      diclofenac sodium (VOLTAREN) 1 % Gel   APPLY 4 GRAMS TOPICALLY 4 (FOUR) TIMES DAILY., # 400 g, 3 total refill(s), Acute, [facility] [Federal Rx: #400 last filled 22]      FREESTYLE LANCETS 28 gauge lancets       FREESTYLE LITE STRIPS Strp       SITagliptin  "(JANUVIA) 100 MG Tab Take 1 tablet (100 mg total) by mouth once daily. 90 tablet 3    traZODone (DESYREL) 50 MG tablet   TAKE 1 TABLET (50 MG TOTAL) BY MOUTH NIGHTLY AS NEEDED FOR INSOMNIA., # 90 EA, 3 total refill(s), Acute, [facility] [Federal Rx: #90 last filled 03/23/22]      FREESTYLE INSULINX Misc       oxybutynin (DITROPAN) 5 MG Tab Take 1 tablet (5 mg total) by mouth nightly. 90 tablet 3     No current facility-administered medications for this visit.     Review of patient's allergies indicates:  No Known Allergies      Review of Systems   HENT:  Negative for congestion.         Band-Aid nose, Dermatology removed lesion recently   Respiratory:  Negative for cough.    Cardiovascular:  Positive for leg swelling.        Reduced lower extremity edema   Musculoskeletal:  Positive for gait problem.        Walker   All other systems reviewed and are negative.    Objective:      Vitals:    11/09/22 0939   BP: (!) 164/60   Pulse: 60   Resp: 16   Weight: 64 kg (141 lb)   Height: 5' 2" (1.575 m)     Physical Exam  Vitals and nursing note reviewed.   Constitutional:       General: She is not in acute distress.     Appearance: Normal appearance.   Cardiovascular:      Pulses:           Dorsalis pedis pulses are 1+ on the right side and 1+ on the left side.        Posterior tibial pulses are 0 on the right side and 0 on the left side.   Pulmonary:      Effort: Pulmonary effort is normal.   Musculoskeletal:      Right foot: Decreased range of motion (Arthritic and degenerative changes bilateral feet). Bunion present.      Left foot: Decreased range of motion. Bunion present.   Feet:      Right foot:      Skin integrity: Callus (Small callus over area of previous ulcer lateral 4th digit right foot, completely healed, no edema, no pain.  Mild callus sub 1st met head right, lateral 5th digit right.) present. No ulcer, skin breakdown or erythema.      Left foot:      Skin integrity: No skin breakdown or erythema.   Skin:     " Capillary Refill: Capillary refill takes 2 to 3 seconds.   Neurological:      General: No focal deficit present.      Mental Status: She is alert.   Psychiatric:         Mood and Affect: Mood normal.         Behavior: Behavior normal.         Thought Content: Thought content normal.         Judgment: Judgment normal.                                      Assessment:       1. Acquired deformity of toe, right    2. Hallux abducto valgus, bilateral    3. Foot callus    4. Edema of both lower extremities    5. Type II diabetes mellitus with neurological manifestations            Plan:         Reviewed bunion, arthritis, acquired deformity of 4th and 5th digits, rubbing between these 2 toes on the right foot.  Advised patient this is completely healed at this point, however she does still have a callus and this needs to be monitored closely to prevent recurrence.  Area was debrided and completely smooth the surrounding skin, no skin opening, discoloration or discomfort present.  Instructed patient to continue using 2 x 2 gauze between the toes during the day and evening as long as the skin is staying dry  Advised patient decrease in swelling has helped significantly.  Continued elevate feet/legs as much as possible  Debrided other calluses right foot and advised patient these are also due to same conditions, bunion and contracted 5th digit right foot.  We discussed callus under the big toe joint and the side of the 5th digit.  Shoes definitely need to be wide comfortable  Contact the office immediately with any changes  Reviewed diabetic education, at high risk for complications  Reviewed need for daily foot checks and instructed patient to contact the office with any area of redness or swelling which has not improved within 3 days.  Patient was in understanding and agreement with treatment plan.  I counseled the patient on their conditions, implications and medical management.  Instructed patient to contact the office  with any changes, questions, concerns, worsening of symptoms.   Total face to face time, exam, assessment, treatment, discussion, documentation 20 minutes, more than half this time spent on consultation and coordination of care.   Follow up 8 weeks      This note was created using [employer] voice recognition software that occasionally misinterpreted phrases or words.

## 2022-11-15 ENCOUNTER — TELEPHONE (OUTPATIENT)
Dept: FAMILY MEDICINE | Facility: CLINIC | Age: 87
End: 2022-11-15
Payer: MEDICARE

## 2022-11-15 NOTE — TELEPHONE ENCOUNTER
----- Message from Kassie Kee, Patient Care Assistant sent at 11/15/2022  1:52 PM CST -----  Regarding: returning call  Contact: pt  Type:  Patient Returning Call    Who Called:  pt   Who Left Message for Patient:  Mason Branch    Does the patient know what this is regarding?:  yes   Best Call Back Number:  [phone] (home)     Additional Information:  please call pt to advise. Thanks!

## 2022-11-15 NOTE — TELEPHONE ENCOUNTER
----- Message from Dariana Martin MD sent at 11/14/2022  7:40 PM CST -----  Please call or for lab results. Notify her kidneys are thirsty! Increase water intake. Recheck 12-16 weeks.

## 2022-12-04 PROBLEM — N32.81 OVERACTIVE BLADDER: Status: ACTIVE | Noted: 2022-12-04

## 2022-12-07 ENCOUNTER — OFFICE VISIT (OUTPATIENT)
Dept: FAMILY MEDICINE | Facility: CLINIC | Age: 87
End: 2022-12-07
Payer: MEDICARE

## 2022-12-07 DIAGNOSIS — R35.1 NOCTURIA: ICD-10-CM

## 2022-12-07 DIAGNOSIS — N32.81 OVERACTIVE BLADDER: Primary | ICD-10-CM

## 2022-12-07 DIAGNOSIS — E11.49 TYPE II DIABETES MELLITUS WITH NEUROLOGICAL MANIFESTATIONS: ICD-10-CM

## 2022-12-07 PROCEDURE — 99441 PR PHYSICIAN TELEPHONE EVALUATION 5-10 MIN: CPT | Mod: 95,,, | Performed by: FAMILY MEDICINE

## 2022-12-07 PROCEDURE — 99441 PR PHYSICIAN TELEPHONE EVALUATION 5-10 MIN: ICD-10-PCS | Mod: 95,,, | Performed by: FAMILY MEDICINE

## 2022-12-07 NOTE — PROGRESS NOTES
Established Patient - Audio Only Telehealth Visit     The patient location is: [place]  The chief complaint leading to consultation is: follow up OAB, urinary frequency  Visit type: Virtual visit with audio only (telephone)  Total time spent with patient: 6 minutes       The reason for the audio only service rather than synchronous audio and video virtual visit was related to technical difficulties or patient preference/necessity.     Each patient to whom I provide medical services by telemedicine is:  (1) informed of the relationship between the physician and patient and the respective role of any other health care provider with respect to management of the patient; and (2) notified that they may decline to receive medical services by telemedicine and may withdraw from such care at any time. Patient verbally consented to receive this service via voice-only telephone call.       HPI: 86yo WF with PMHx of DM, urinary incontinence, OAB. Was started on oxybutinin 5mg nightly at last visit. This has helped only slightly and she wishes to increase the dose if possible. No side effects.     Assessment and plan:  OAB, will increase to 10mg nightly OR 5mg BID using current prescription. Updated 10mg extended release dose sent to pharmacy. Will need follow up in approx 6 weeks. Once she tolerates the 10mg immediate release without side effects, change to the 10mg extended release dose.                        This service was not originating from a related E/M service provided within the previous 7 days nor will  to an E/M service or procedure within the next 24 hours or my soonest available appointment.  Prevailing standard of care was able to be met in this audio-only visit.

## 2022-12-18 RX ORDER — OXYBUTYNIN CHLORIDE 10 MG/1
10 TABLET, EXTENDED RELEASE ORAL NIGHTLY
Qty: 90 TABLET | Refills: 3 | Status: SHIPPED | OUTPATIENT
Start: 2022-12-18 | End: 2023-01-27 | Stop reason: SDUPTHER

## 2023-01-11 DIAGNOSIS — E11.49 TYPE II DIABETES MELLITUS WITH NEUROLOGICAL MANIFESTATIONS: ICD-10-CM

## 2023-01-11 RX ORDER — TRAZODONE HYDROCHLORIDE 50 MG/1
TABLET ORAL
Qty: 90 TABLET | Refills: 3 | Status: SHIPPED | OUTPATIENT
Start: 2023-01-11 | End: 2023-06-14 | Stop reason: SDUPTHER

## 2023-01-11 RX ORDER — ATORVASTATIN CALCIUM 40 MG/1
40 TABLET, FILM COATED ORAL NIGHTLY
Qty: 90 TABLET | Refills: 3 | Status: SHIPPED | OUTPATIENT
Start: 2023-01-11 | End: 2023-06-14 | Stop reason: SDUPTHER

## 2023-01-11 RX ORDER — METFORMIN HYDROCHLORIDE 500 MG/1
500 TABLET ORAL NIGHTLY
Qty: 90 TABLET | Refills: 3 | Status: SHIPPED | OUTPATIENT
Start: 2023-01-11 | End: 2023-04-17 | Stop reason: SDUPTHER

## 2023-01-27 DIAGNOSIS — N32.81 OVERACTIVE BLADDER: ICD-10-CM

## 2023-01-27 DIAGNOSIS — R35.1 NOCTURIA: ICD-10-CM

## 2023-01-27 RX ORDER — OXYBUTYNIN CHLORIDE 5 MG/1
5 TABLET ORAL 2 TIMES DAILY
Qty: 180 TABLET | Refills: 3 | Status: SHIPPED | OUTPATIENT
Start: 2023-01-27 | End: 2023-01-27 | Stop reason: CLARIF

## 2023-01-27 NOTE — TELEPHONE ENCOUNTER
Patient stated she never got the Oxybutynin 10mg back in December because the [facility] will not fill it. Please send to [facility].   0

## 2023-01-27 NOTE — TELEPHONE ENCOUNTER
Error, this will be cancelled. Patient currently should be taking oxybutynin 10mg ER once daily. She has follow up in a couple of week.s

## 2023-01-29 RX ORDER — OXYBUTYNIN CHLORIDE 10 MG/1
10 TABLET, EXTENDED RELEASE ORAL NIGHTLY
Qty: 90 TABLET | Refills: 3 | Status: SHIPPED | OUTPATIENT
Start: 2023-01-29 | End: 2023-06-14

## 2023-02-13 ENCOUNTER — OFFICE VISIT (OUTPATIENT)
Dept: PODIATRY | Facility: CLINIC | Age: 88
End: 2023-02-13
Payer: MEDICARE

## 2023-02-13 VITALS
HEIGHT: 62 IN | RESPIRATION RATE: 16 BRPM | SYSTOLIC BLOOD PRESSURE: 182 MMHG | HEART RATE: 70 BPM | BODY MASS INDEX: 25.47 KG/M2 | DIASTOLIC BLOOD PRESSURE: 63 MMHG | WEIGHT: 138.38 LBS

## 2023-02-13 DIAGNOSIS — E11.9 COMPREHENSIVE DIABETIC FOOT EXAMINATION, TYPE 2 DM, ENCOUNTER FOR: Primary | ICD-10-CM

## 2023-02-13 DIAGNOSIS — L84 FOOT CALLUS: ICD-10-CM

## 2023-02-13 DIAGNOSIS — M21.621 TAILOR'S BUNIONETTE, RIGHT: ICD-10-CM

## 2023-02-13 DIAGNOSIS — E11.59 CONTROLLED TYPE 2 DIABETES MELLITUS WITH OTHER CIRCULATORY COMPLICATION, UNSPECIFIED WHETHER LONG TERM INSULIN USE: ICD-10-CM

## 2023-02-13 PROCEDURE — 99214 OFFICE O/P EST MOD 30 MIN: CPT | Mod: PBBFAC,PN | Performed by: PODIATRIST

## 2023-02-13 PROCEDURE — 99213 PR OFFICE/OUTPT VISIT, EST, LEVL III, 20-29 MIN: ICD-10-PCS | Mod: S$PBB,,, | Performed by: PODIATRIST

## 2023-02-13 PROCEDURE — 99213 OFFICE O/P EST LOW 20 MIN: CPT | Mod: S$PBB,,, | Performed by: PODIATRIST

## 2023-02-13 PROCEDURE — 99999 PR PBB SHADOW E&M-EST. PATIENT-LVL IV: CPT | Mod: PBBFAC,,, | Performed by: PODIATRIST

## 2023-02-13 PROCEDURE — 99999 PR PBB SHADOW E&M-EST. PATIENT-LVL IV: ICD-10-PCS | Mod: PBBFAC,,, | Performed by: PODIATRIST

## 2023-02-13 RX ORDER — ESCITALOPRAM OXALATE 10 MG/1
1.5 TABLET ORAL DAILY
COMMUNITY
Start: 2022-06-17 | End: 2023-06-14 | Stop reason: SDUPTHER

## 2023-02-13 RX ORDER — METFORMIN HYDROCHLORIDE 500 MG/1
500 TABLET, EXTENDED RELEASE ORAL
COMMUNITY
Start: 2023-01-18 | End: 2023-06-14

## 2023-02-13 RX ORDER — OXYBUTYNIN CHLORIDE 5 MG/1
5 TABLET ORAL
COMMUNITY
Start: 2022-11-09 | End: 2023-02-13 | Stop reason: ALTCHOICE

## 2023-02-13 NOTE — PROGRESS NOTES
Subjective:       Patient ID: Cinthya Cuevas is a 88 y.o. female.    Chief Complaint: Follow-up and Diabetic Foot Exam  Patient presents for annual diabetic foot exam, follow-up ulcer 4th digit right foot.  Patient relates ulcer between the 4th and 5th digits right foot has remained healed, no pain, she checks the area frequently and make sure it stays dry.  She is noticed very recently a small callus developing on the outside of the right foot, feels it is due to compression socks.  No pain.  Has long history of type 2 diabetes which she states has always been well controlled, glucose when checked on occasion is always low 100 range.  Denies burning or tingling in feet.  Relates she has felt very well, stays active, still driving, swelling in her legs but much improved      Past Medical History:   Diagnosis Date    Anemia     Arthritis     Diabetes mellitus     Encounter for blood transfusion     Impaired mobility      Past Surgical History:   Procedure Laterality Date    APPENDECTOMY      BACK SURGERY      3/2015    CHOLECYSTECTOMY      COLONOSCOPY      EYE SURGERY Bilateral     cataract    FRACTURE SURGERY      right ankle     HYSTERECTOMY      JOINT REPLACEMENT      left hip, right knee     History reviewed. No pertinent family history.  Social History     Socioeconomic History    Marital status:    Tobacco Use    Smoking status: Former     Packs/day: 0.25     Years: 31.00     Pack years: 7.75     Types: Cigarettes     Start date: 1957     Quit date: 1988     Years since quittin.1    Smokeless tobacco: Never   Substance and Sexual Activity    Alcohol use: No    Drug use: No       Current Outpatient Medications   Medication Sig Dispense Refill    amitriptyline (ELAVIL) 10 MG tablet amitriptyline 10 mg tablet      atorvastatin (LIPITOR) 40 MG tablet Take 1 tablet (40 mg total) by mouth every evening. 90 tablet 3    diclofenac sodium (VOLTAREN) 1 % Gel   APPLY 4 GRAMS TOPICALLY 4  "(FOUR) TIMES DAILY., # 400 g, 3 total refill(s), Acute, [facility] [Federal Rx: #400 last filled 06/22/22]      EScitalopram oxalate (LEXAPRO) 10 MG tablet Take 1.5 tablets by mouth once daily.      FREESTYLE INSULINX Misc       FREESTYLE LANCETS 28 gauge lancets       FREESTYLE LITE STRIPS Strp       metFORMIN (GLUCOPHAGE) 500 MG tablet Take 1 tablet (500 mg total) by mouth every evening. 90 tablet 3    metFORMIN (GLUCOPHAGE-XR) 500 MG ER 24hr tablet Take 500 mg by mouth.      oxybutynin (DITROPAN-XL) 10 MG 24 hr tablet Take 1 tablet (10 mg total) by mouth every evening. For overactive bladder 90 tablet 3    SITagliptin phosphate (JANUVIA) 100 MG Tab Take 1 tablet (100 mg total) by mouth once daily. 90 tablet 3    traZODone (DESYREL) 50 MG tablet TAKE 1 TABLET (50 MG TOTAL) BY MOUTH NIGHTLY AS NEEDED FOR INSOMNIA., # 90 EA, 3 total refill(s), Acute, [facility] [Federal Rx: #90 last filled 03/23/22] 90 tablet 3     No current facility-administered medications for this visit.     Review of patient's allergies indicates:  No Known Allergies      Review of Systems   HENT:  Negative for congestion.         Band-Aid nose, Dermatology removed lesion recently   Respiratory:  Negative for cough.    Cardiovascular:  Positive for leg swelling.   Musculoskeletal:  Positive for gait problem.        Walker   All other systems reviewed and are negative.    Objective:      Vitals:    02/13/23 0955   BP: (!) 182/63   Pulse: 70   Resp: 16   Weight: 62.8 kg (138 lb 6.4 oz)   Height: 5' 2" (1.575 m)     Physical Exam  Vitals and nursing note reviewed.   Constitutional:       General: She is not in acute distress.     Appearance: Normal appearance.   Cardiovascular:      Pulses:           Dorsalis pedis pulses are 1+ on the right side and 1+ on the left side.        Posterior tibial pulses are 0 on the right side and 0 on the left side.   Pulmonary:      Effort: Pulmonary effort is normal.   Musculoskeletal:         General: No tenderness.      " Right lower leg: Edema present.      Left lower leg: Edema present.      Right foot: Decreased range of motion (Arthritic and degenerative changes bilateral feet). Bunion (mild HAV and tailor's bunion bilateral) present.      Left foot: Decreased range of motion. Bunion present.      Comments: Limited mobility   Feet:      Right foot:      Protective Sensation: 6 sites tested.  6 sites sensed.      Skin integrity: Callus (Mild, very recent thin area of hyperkeratotic tissue lateral 5th met head right, no pain) present. No skin breakdown or erythema.      Toenail Condition: Right toenails are long.      Left foot:      Protective Sensation: 6 sites tested.  6 sites sensed.      Skin integrity: No skin breakdown, erythema or callus.      Toenail Condition: Left toenails are long.   Skin:     Capillary Refill: Capillary refill takes 2 to 3 seconds.   Neurological:      General: No focal deficit present.      Mental Status: She is alert.      Comments: Sensation intact all areas bilateral feet with monofilament testing, no pain/paresthesias feet   Psychiatric:         Mood and Affect: Mood normal.         Behavior: Behavior normal.         Thought Content: Thought content normal.         Judgment: Judgment normal.         Contains abnormal data Hemoglobin A1C  Component Ref Range & Units 3 mo ago 7 mo ago 11 mo ago 1 yr ago   Hemoglobin A1C 4.0 - 5.6 % 5.9 High   6.0 High   6.1 High   6.5 High        Estimated Avg Glucose 68 - 131 mg/dL 123  126  128  140 High                                               Assessment:       1. Comprehensive diabetic foot examination, type 2 DM, encounter for    2. Controlled type 2 diabetes mellitus with other circulatory complication, unspecified whether long term insulin use    3. Tailor's bunionette, right    4. Foot callus              Plan:           Comprehensive diabetic pedal exam performed  Reviewed good sensation in feet and neuropathy  Reviewed diabetic education  Discussed  benefit of controled glucose/diabetes regarding potential foot problems especially neuropathy.    Reviewed mild bunions of the big toe joint and the 5th digit, slightly worse on the right side and explained the way the 5th digit slightly rotates, additional pressure between the 4th and 5th digit puts her at risk for more complications in this location.  Patient understands she needs to wear wide comfortable shoes, keep the 4th webspace clean and dry.  Pointed out slightly prominent 5th metatarsal head, mild callus in this area which was debrided without complication and advised to apply lotion to this area at bed at night, a small amount.  Make sure it is thoroughly absorbed into the skin.  Encouraged patient to experiment with a wider shoe, lighter compression sock/hose, monitor closely and contact the office with any changes  Discussed maintenance of skin and nails and potential complications. Nails debrided   Reviewed need for daily foot checks and instructed patient to contact the office with any area of redness or swelling which has not improved within 3 days.  Patient was in understanding and agreement with treatment plan.  I counseled the patient on their conditions, implications and medical management.  Instructed patient/family to contact the office with any changes, questions, concerns, worsening of symptoms.   Total face to face time 20 minutes, exam, assessment, treatment, discussion, additional time for review of chart prior to and following appointment and visit documentation, consultation and coordination of care.   Follow up 2 months    This note was created using [employer] voice recognition software that occasionally misinterpreted phrases or words.

## 2023-02-22 ENCOUNTER — OFFICE VISIT (OUTPATIENT)
Dept: FAMILY MEDICINE | Facility: CLINIC | Age: 88
End: 2023-02-22
Payer: MEDICARE

## 2023-02-22 ENCOUNTER — TELEPHONE (OUTPATIENT)
Dept: FAMILY MEDICINE | Facility: CLINIC | Age: 88
End: 2023-02-22

## 2023-02-22 VITALS
DIASTOLIC BLOOD PRESSURE: 78 MMHG | TEMPERATURE: 98 F | OXYGEN SATURATION: 99 % | RESPIRATION RATE: 18 BRPM | WEIGHT: 130 LBS | HEIGHT: 62 IN | HEART RATE: 70 BPM | SYSTOLIC BLOOD PRESSURE: 132 MMHG | BODY MASS INDEX: 23.92 KG/M2

## 2023-02-22 DIAGNOSIS — G89.29 CHRONIC RIGHT SHOULDER PAIN: ICD-10-CM

## 2023-02-22 DIAGNOSIS — M19.019 SHOULDER ARTHRITIS: ICD-10-CM

## 2023-02-22 DIAGNOSIS — M25.511 CHRONIC RIGHT SHOULDER PAIN: ICD-10-CM

## 2023-02-22 DIAGNOSIS — G89.29 CHRONIC RIGHT HIP PAIN: ICD-10-CM

## 2023-02-22 DIAGNOSIS — Z79.899 ENCOUNTER FOR LONG-TERM CURRENT USE OF MEDICATION: ICD-10-CM

## 2023-02-22 DIAGNOSIS — N32.81 OVERACTIVE BLADDER: Primary | ICD-10-CM

## 2023-02-22 DIAGNOSIS — M25.551 CHRONIC RIGHT HIP PAIN: ICD-10-CM

## 2023-02-22 DIAGNOSIS — R53.1 WEAKNESS: ICD-10-CM

## 2023-02-22 DIAGNOSIS — E11.49 TYPE II DIABETES MELLITUS WITH NEUROLOGICAL MANIFESTATIONS: ICD-10-CM

## 2023-02-22 PROCEDURE — 99214 OFFICE O/P EST MOD 30 MIN: CPT | Mod: PBBFAC,PN | Performed by: FAMILY MEDICINE

## 2023-02-22 PROCEDURE — 99999 PR PBB SHADOW E&M-EST. PATIENT-LVL IV: CPT | Mod: PBBFAC,,, | Performed by: FAMILY MEDICINE

## 2023-02-22 PROCEDURE — 99999 PR PBB SHADOW E&M-EST. PATIENT-LVL IV: ICD-10-PCS | Mod: PBBFAC,,, | Performed by: FAMILY MEDICINE

## 2023-02-22 PROCEDURE — 99214 OFFICE O/P EST MOD 30 MIN: CPT | Mod: S$PBB,,, | Performed by: FAMILY MEDICINE

## 2023-02-22 PROCEDURE — 99214 PR OFFICE/OUTPT VISIT, EST, LEVL IV, 30-39 MIN: ICD-10-PCS | Mod: S$PBB,,, | Performed by: FAMILY MEDICINE

## 2023-02-22 RX ORDER — LIDOCAINE 50 MG/G
1 PATCH TOPICAL DAILY
Qty: 90 PATCH | Refills: 0 | Status: SHIPPED | OUTPATIENT
Start: 2023-02-22 | End: 2023-06-14 | Stop reason: SDUPTHER

## 2023-02-22 NOTE — TELEPHONE ENCOUNTER
Follow up appointment had been scheduled before patient left. Lab appointment scheduled and reminder mailed.

## 2023-02-22 NOTE — TELEPHONE ENCOUNTER
----- Message from Dariana Martin MD sent at 2/22/2023  1:05 PM CST -----  Schedule labs for mid to late May, and follow up a week or so later for DM, lab charles.

## 2023-02-22 NOTE — PROGRESS NOTES
"  Subjective:       Patient ID: Cinthya Cuevas is a 88 y.o. female.    Chief Complaint: Follow-up    OAB--much better with the Ditropan XL 10mg nightly.    OA--Weakness, R shoulder pain, R hip pain--patient with chronic R shoulder and hip pain. States she saw Dr. Piper earlier this month and got steroid injection in the R shoulder and R hip. Helps with pain, but now shoulder is "bone on bone." Requests PT referral for help with gait, pain control, and decreased fall risk. CBD topical has been helping. She has not used ice or heat.    Toe ulcer--Saw podiatry/Dr. Urias last week for chronic toe ulcer. Reports she noticed improvement in the toe.    DM--In regards to the patient's diabetes, patient denies hypoglycemic symptoms or any symptoms of fluctuating blood glucose.        Depression Patient Health Questionnaire 11/9/2022 8/17/2021   Over the last two weeks how often have you been bothered by little interest or pleasure in doing things Not at all Not at all   Over the last two weeks how often have you been bothered by feeling down, depressed or hopeless Not at all Not at all   PHQ-2 Total Score 0 0     Review of Systems   All other systems reviewed and are negative.      Past Medical History:   Diagnosis Date    Anemia     Arthritis     Diabetes mellitus     Encounter for blood transfusion     Impaired mobility      Past Surgical History:   Procedure Laterality Date    APPENDECTOMY      BACK SURGERY      3/2015    CHOLECYSTECTOMY      COLONOSCOPY      EYE SURGERY Bilateral     cataract    FRACTURE SURGERY      right ankle 2000    HYSTERECTOMY      JOINT REPLACEMENT      left hip, right knee     History reviewed. No pertinent family history.    Review of patient's allergies indicates:  No Known Allergies    Current Outpatient Medications:     amitriptyline (ELAVIL) 10 MG tablet, amitriptyline 10 mg tablet, Disp: , Rfl:     atorvastatin (LIPITOR) 40 MG tablet, Take 1 tablet (40 mg total) by mouth every " "evening., Disp: 90 tablet, Rfl: 3    diclofenac sodium (VOLTAREN) 1 % Gel,  APPLY 4 GRAMS TOPICALLY 4 (FOUR) TIMES DAILY., # 400 g, 3 total refill(s), Acute, [facility] [Federal Rx: #400 last filled 06/22/22], Disp: , Rfl:     EScitalopram oxalate (LEXAPRO) 10 MG tablet, Take 1.5 tablets by mouth once daily., Disp: , Rfl:     FREESTYLE INSULINX Misc, , Disp: , Rfl:     FREESTYLE LANCETS 28 gauge lancets, , Disp: , Rfl:     FREESTYLE LITE STRIPS Strp, , Disp: , Rfl:     LIDOcaine (LIDODERM) 5 %, Place 1 patch onto the skin once daily. Remove & Discard patch within 12 hours or as directed by MD, Disp: 90 patch, Rfl: 0    metFORMIN (GLUCOPHAGE) 500 MG tablet, Take 1 tablet (500 mg total) by mouth every evening., Disp: 90 tablet, Rfl: 3    metFORMIN (GLUCOPHAGE-XR) 500 MG ER 24hr tablet, Take 500 mg by mouth., Disp: , Rfl:     oxybutynin (DITROPAN-XL) 10 MG 24 hr tablet, Take 1 tablet (10 mg total) by mouth every evening. For overactive bladder, Disp: 90 tablet, Rfl: 3    SITagliptin phosphate (JANUVIA) 100 MG Tab, Take 1 tablet (100 mg total) by mouth once daily., Disp: 90 tablet, Rfl: 3    traZODone (DESYREL) 50 MG tablet, TAKE 1 TABLET (50 MG TOTAL) BY MOUTH NIGHTLY AS NEEDED FOR INSOMNIA., # 90 EA, 3 total refill(s), Acute, [facility] [Federal Rx: #90 last filled 03/23/22], Disp: 90 tablet, Rfl: 3      Objective:      /78 (BP Location: Right arm, Patient Position: Sitting, BP Method: Medium (Manual))   Pulse 70   Temp 98.4 °F (36.9 °C)   Resp 18   Ht 5' 2" (1.575 m)   Wt 59 kg (130 lb)   SpO2 99%   BMI 23.78 kg/m²   Physical Exam  Vitals and nursing note reviewed.   Constitutional:       General: She is not in acute distress.     Appearance: Normal appearance. She is well-developed and normal weight. She is not ill-appearing, toxic-appearing or diaphoretic.   HENT:      Head: Normocephalic and atraumatic.      Ears:      Comments: Hearing aid L ear, and had a [place] implanted R ear ("made by the cochlear implant " "folks")   Eyes:      General: No scleral icterus.        Right eye: No discharge.         Left eye: No discharge.      Extraocular Movements: Extraocular movements intact.      Conjunctiva/sclera: Conjunctivae normal.   Neck:      Thyroid: No thyromegaly.      Vascular: No JVD.      Trachea: No tracheal deviation.   Cardiovascular:      Rate and Rhythm: Normal rate and regular rhythm.      Pulses: Normal pulses.      Heart sounds: Normal heart sounds. No murmur heard.    No friction rub. No gallop.   Pulmonary:      Effort: Pulmonary effort is normal. No respiratory distress.      Breath sounds: Normal breath sounds. No wheezing, rhonchi or rales.   Abdominal:      General: Abdomen is flat.      Palpations: Abdomen is soft.      Tenderness: There is no right CVA tenderness.   Musculoskeletal:         General: Tenderness (R shoulder and R hip) present.      Cervical back: Normal range of motion and neck supple. No tenderness.      Right lower leg: No edema.      Left lower leg: No edema.   Lymphadenopathy:      Cervical: No cervical adenopathy.   Skin:     General: Skin is warm and dry.      Capillary Refill: Capillary refill takes less than 2 seconds.      Coloration: Skin is not jaundiced or pale.      Findings: No erythema or rash.   Neurological:      General: No focal deficit present.      Mental Status: She is alert and oriented to person, place, and time. Mental status is at baseline.      Motor: No weakness.      Coordination: Coordination normal.      Gait: Gait abnormal (slow, guarded, uses walker).   Psychiatric:         Mood and Affect: Mood normal.         Behavior: Behavior normal.         Thought Content: Thought content normal.         Judgment: Judgment normal.       Assessment:       1. Overactive bladder    2. Type II diabetes mellitus with neurological manifestations    3. Chronic right shoulder pain    4. Chronic right hip pain    5. Weakness    6. Shoulder arthritis    7. Encounter for long-term " current use of medication        Plan:       Overactive bladder    Type II diabetes mellitus with neurological manifestations  -     Hemoglobin A1C; Future; Expected date: 02/22/2023  -     Vitamin B12; Future; Expected date: 02/22/2023  -     Magnesium; Future; Expected date: 02/22/2023  -     Comprehensive Metabolic Panel; Future; Expected date: 02/22/2023  -     CBC Auto Differential; Future; Expected date: 02/22/2023  -     Lipid Panel; Future; Expected date: 02/22/2023    Chronic right shoulder pain  -     Ambulatory referral/consult to Physical/Occupational Therapy; Future; Expected date: 03/01/2023  -     LIDOcaine (LIDODERM) 5 %; Place 1 patch onto the skin once daily. Remove & Discard patch within 12 hours or as directed by MD  Dispense: 90 patch; Refill: 0    Chronic right hip pain  -     Ambulatory referral/consult to Physical/Occupational Therapy; Future; Expected date: 03/01/2023  -     LIDOcaine (LIDODERM) 5 %; Place 1 patch onto the skin once daily. Remove & Discard patch within 12 hours or as directed by MD  Dispense: 90 patch; Refill: 0    Weakness  -     Ambulatory referral/consult to Physical/Occupational Therapy; Future; Expected date: 03/01/2023    Shoulder arthritis    Encounter for long-term current use of medication  -     Hemoglobin A1C; Future; Expected date: 02/22/2023  -     Vitamin B12; Future; Expected date: 02/22/2023  -     Magnesium; Future; Expected date: 02/22/2023  -     Comprehensive Metabolic Panel; Future; Expected date: 02/22/2023  -     CBC Auto Differential; Future; Expected date: 02/22/2023  -     Lipid Panel; Future; Expected date: 02/22/2023      Continue Ditropan XL 10mg nightly for OAB.  Continue metformin for DM.  Trial of Lidoderm 5% patch for severe OA R shoulder.  Consult PT for help with mobility and shoulder and hip pain.  Lab in 3 months before follow up.        Previous records in Epic were reviewed, including the last 3 months of encounters, imaging,  laboratory, and pathology reports.    Strict return precautions reviewed and patient verbalized understanding. Risks, benefits, and alternatives to the plan were reviewed in detail and all questions answered to the patient's satisfaction. Patient agrees to return to clinic or ER if symptoms worsen. 30 minutes total were spent on today's visit, not limited to but including time based on counseling and coordination of care.    Patient instructed that best way to communicate with my office staff is for patient to get on the [facility] epic patient portal to expedite communication and communication issues that may occur.  Patient was given instructions on how to get on the portal.  I encouraged patient to obtain portal access as well.  Ultimately it is up to the patient to obtain access.  Patient voiced understanding.    This note was created using [employer] voice recognition software that occasionally may misinterpret phrases or words.    Follow up in about 3 months (around 5/22/2023) for f/u DM, OA, lab results.

## 2023-02-28 PROCEDURE — G0180 MD CERTIFICATION HHA PATIENT: HCPCS | Mod: ,,, | Performed by: FAMILY MEDICINE

## 2023-02-28 PROCEDURE — G0180 PR HOME HEALTH MD CERTIFICATION: ICD-10-PCS | Mod: ,,, | Performed by: FAMILY MEDICINE

## 2023-03-29 ENCOUNTER — EXTERNAL HOME HEALTH (OUTPATIENT)
Dept: HOME HEALTH SERVICES | Facility: HOSPITAL | Age: 88
End: 2023-03-29
Payer: MEDICARE

## 2023-04-11 ENCOUNTER — DOCUMENT SCAN (OUTPATIENT)
Dept: HOME HEALTH SERVICES | Facility: HOSPITAL | Age: 88
End: 2023-04-11
Payer: MEDICARE

## 2023-04-13 ENCOUNTER — DOCUMENT SCAN (OUTPATIENT)
Dept: HOME HEALTH SERVICES | Facility: HOSPITAL | Age: 88
End: 2023-04-13
Payer: MEDICARE

## 2023-04-17 ENCOUNTER — OFFICE VISIT (OUTPATIENT)
Dept: PODIATRY | Facility: CLINIC | Age: 88
End: 2023-04-17
Payer: MEDICARE

## 2023-04-17 VITALS
HEIGHT: 62 IN | RESPIRATION RATE: 16 BRPM | HEART RATE: 69 BPM | DIASTOLIC BLOOD PRESSURE: 70 MMHG | SYSTOLIC BLOOD PRESSURE: 152 MMHG | WEIGHT: 135 LBS | BODY MASS INDEX: 24.84 KG/M2

## 2023-04-17 DIAGNOSIS — R60.0 EDEMA OF BOTH LOWER EXTREMITIES: ICD-10-CM

## 2023-04-17 DIAGNOSIS — L84 FOOT CALLUS: ICD-10-CM

## 2023-04-17 DIAGNOSIS — E11.59 CONTROLLED TYPE 2 DIABETES MELLITUS WITH OTHER CIRCULATORY COMPLICATION, UNSPECIFIED WHETHER LONG TERM INSULIN USE: ICD-10-CM

## 2023-04-17 DIAGNOSIS — L97.511 ULCER OF RIGHT FOOT, LIMITED TO BREAKDOWN OF SKIN: Primary | ICD-10-CM

## 2023-04-17 DIAGNOSIS — M21.621 TAILOR'S BUNIONETTE, RIGHT: ICD-10-CM

## 2023-04-17 PROCEDURE — 99214 PR OFFICE/OUTPT VISIT, EST, LEVL IV, 30-39 MIN: ICD-10-PCS | Mod: S$PBB,,, | Performed by: PODIATRIST

## 2023-04-17 PROCEDURE — 99999 PR PBB SHADOW E&M-EST. PATIENT-LVL IV: CPT | Mod: PBBFAC,,, | Performed by: PODIATRIST

## 2023-04-17 PROCEDURE — 99214 OFFICE O/P EST MOD 30 MIN: CPT | Mod: S$PBB,,, | Performed by: PODIATRIST

## 2023-04-17 PROCEDURE — 99999 PR PBB SHADOW E&M-EST. PATIENT-LVL IV: ICD-10-PCS | Mod: PBBFAC,,, | Performed by: PODIATRIST

## 2023-04-17 PROCEDURE — 99214 OFFICE O/P EST MOD 30 MIN: CPT | Mod: PBBFAC,PN | Performed by: PODIATRIST

## 2023-04-17 NOTE — PROGRESS NOTES
Subjective:       Patient ID: Cinthya Cuevas is a 88 y.o. female.    Chief Complaint: Follow-up, Callouses, Diabetes Mellitus, Foot Pain, and Foot Swelling  Patient presents with complaint of pain on the outside of the 5th digit which she states has been very painful especially at night x 1 month.  She has a history of the 4th and 5th digits which took almost a year to heal.  She relates this areas very well she still suffers with swelling in her feet, no change in her shoes or activity, not very active.  She is been getting a lot of rubbing on the outside of the right foot, shooting pain at night.  Relates diabetes remains very well controlled, glucose 138 yesterday. Trying to stay active, still driving.  No changes in health since last visit. Pain right foot 8/10      Past Medical History:   Diagnosis Date    Anemia     Arthritis     Diabetes mellitus     Encounter for blood transfusion     Impaired mobility      Past Surgical History:   Procedure Laterality Date    APPENDECTOMY      BACK SURGERY      3/2015    CHOLECYSTECTOMY      COLONOSCOPY      EYE SURGERY Bilateral     cataract    FRACTURE SURGERY      right ankle     HYSTERECTOMY      JOINT REPLACEMENT      left hip, right knee     History reviewed. No pertinent family history.  Social History     Socioeconomic History    Marital status:    Tobacco Use    Smoking status: Former     Packs/day: 0.25     Years: 31.00     Pack years: 7.75     Types: Cigarettes     Start date: 1957     Quit date: 1988     Years since quittin.3    Smokeless tobacco: Never   Substance and Sexual Activity    Alcohol use: No    Drug use: No       Current Outpatient Medications   Medication Sig Dispense Refill    amitriptyline (ELAVIL) 10 MG tablet amitriptyline 10 mg tablet      atorvastatin (LIPITOR) 40 MG tablet Take 1 tablet (40 mg total) by mouth every evening. 90 tablet 3    diclofenac sodium (VOLTAREN) 1 % Gel   APPLY 4 GRAMS TOPICALLY 4 (FOUR)  "TIMES DAILY., # 400 g, 3 total refill(s), Acute, [facility] [Federal Rx: #400 last filled 06/22/22]      EScitalopram oxalate (LEXAPRO) 10 MG tablet Take 1.5 tablets by mouth once daily.      FREESTYLE INSULINX Misc       FREESTYLE LANCETS 28 gauge lancets       FREESTYLE LITE STRIPS Strp       LIDOcaine (LIDODERM) 5 % Place 1 patch onto the skin once daily. Remove & Discard patch within 12 hours or as directed by MD Villegas patch 0    metFORMIN (GLUCOPHAGE-XR) 500 MG ER 24hr tablet Take 500 mg by mouth.      oxybutynin (DITROPAN-XL) 10 MG 24 hr tablet Take 1 tablet (10 mg total) by mouth every evening. For overactive bladder 90 tablet 3    SITagliptin phosphate (JANUVIA) 100 MG Tab Take 1 tablet (100 mg total) by mouth once daily. 90 tablet 3    traZODone (DESYREL) 50 MG tablet TAKE 1 TABLET (50 MG TOTAL) BY MOUTH NIGHTLY AS NEEDED FOR INSOMNIA., # 90 EA, 3 total refill(s), Acute, [facility] [Federal Rx: #90 last filled 03/23/22] 90 tablet 3     No current facility-administered medications for this visit.     Review of patient's allergies indicates:  No Known Allergies      Review of Systems   HENT:  Negative for congestion.         Band-Aid nose, Dermatology removed lesion recently   Respiratory:  Negative for cough.    Cardiovascular:  Positive for leg swelling.   Musculoskeletal:  Positive for gait problem.        Walker   All other systems reviewed and are negative.    Objective:      Vitals:    04/17/23 0950   BP: (!) 152/70   Pulse: 69   Resp: 16   Weight: 61.2 kg (135 lb)   Height: 5' 2" (1.575 m)     Physical Exam  Vitals and nursing note reviewed.   Constitutional:       General: She is not in acute distress.     Appearance: Normal appearance.   Cardiovascular:      Pulses:           Dorsalis pedis pulses are 1+ on the right side and 1+ on the left side.        Posterior tibial pulses are 0 on the right side and 0 on the left side.   Pulmonary:      Effort: Pulmonary effort is normal.   Musculoskeletal:         General: " Tenderness present.      Right lower leg: Edema present.      Left lower leg: Edema present.      Right foot: Decreased range of motion (Arthritic and degenerative changes bilateral feet). Bunion (mild HAV and tailor's bunion bilateral) present.      Left foot: Decreased range of motion. Bunion present.      Comments: Pain lateral 5th metatarsal head, edema, with sural neuritis. Limited mobility   Feet:      Right foot:      Skin integrity: Ulcer, skin breakdown and callus (pain, small superficial ulcer, increased hyperkeratotic tissue lateral 5th met head right.  Pre ulcerative callus sub 1st met head right, no pain) present.      Toenail Condition: Right toenails are abnormally thick and long.      Left foot:      Skin integrity: No skin breakdown, erythema or callus.      Toenail Condition: Left toenails are abnormally thick and long.   Skin:     Capillary Refill: Capillary refill takes 2 to 3 seconds.   Neurological:      General: No focal deficit present.      Mental Status: She is alert.   Psychiatric:         Mood and Affect: Mood normal.         Behavior: Behavior normal.         Thought Content: Thought content normal.         Judgment: Judgment normal.                                    Assessment:       1. Ulcer of right foot, limited to breakdown of skin    2. Tailor's bunionette, right    3. Edema of both lower extremities    4. Controlled type 2 diabetes mellitus with other circulatory complication, unspecified whether long term insulin use    5. Foot callus                Plan:         Reviewed ulcer on the side of the 5th metatarsal head with patient.  She is familiar on care of this area treating an ulcer between the 4th and 5th digits quite some while which did eventually heal with patient's daily treatment.  She understands this area needs to be kept clean and dry.  We reviewed appropriate shoes, wider to accommodate for swelling.  Wearing an oversized soft shoe or slipper at home to help take  pressure off this area  Reviewed signs of infection to monitor for potential complications especially with diabetes  Care today involve debridement of ulcer lateral 5th met head right foot.  Pointed out very small superficial skin opening, Xeroform, gauze and fabric bandage applied  Samples dispensed for patient to apply same dressing daily to keep the area clean and protected when in shoes.  At night remove the bandage and let the area air out, making sure there is no pressure on the side of the joint when sleeping.  Can use Voltaren gel as directed in the evening for pain as long as well tolerated and beneficial  Apply dressing once daily for about 7 days, pain should be resolved at that point.  If any changes or increase in pain contact the office immediately  We reviewed preulcerative callus under the big toe joint right foot, area debrided with no complications at this time  Reviewed care and maintenance of skin and nails, nails debrided  We reviewed potential complications due to swelling in her feet, tension on the skin, keeping skin well hydrated and monitoring for any areas of redness, warmth or weeping on her feet or legs  Elevate legs as much as possible at home  Reviewed diabetic education and daily foot checks  Patient was in understanding and agreement with treatment plan.  I counseled the patient on their conditions, implications and medical management.  Instructed patient to contact the office with any changes, questions, concerns, worsening of symptoms.   Total face to face time 30 minutes, exam, assessment, treatment, discussion, additional time for review of chart prior to and following appointment and visit documentation, consultation and coordination of care.   Follow up 1 month    This note was created using [employer] voice recognition software that occasionally misinterpreted phrases or words.

## 2023-04-18 ENCOUNTER — OFFICE VISIT (OUTPATIENT)
Dept: FAMILY MEDICINE | Facility: CLINIC | Age: 88
End: 2023-04-18
Payer: MEDICARE

## 2023-04-18 VITALS
WEIGHT: 135 LBS | SYSTOLIC BLOOD PRESSURE: 136 MMHG | TEMPERATURE: 98 F | DIASTOLIC BLOOD PRESSURE: 78 MMHG | HEART RATE: 65 BPM | OXYGEN SATURATION: 98 % | BODY MASS INDEX: 24.84 KG/M2 | HEIGHT: 62 IN

## 2023-04-18 DIAGNOSIS — N18.31 CHRONIC KIDNEY DISEASE, STAGE 3A: ICD-10-CM
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
## 2023-04-18 DIAGNOSIS — G89.29 CHRONIC RIGHT SHOULDER PAIN: Primary | ICD-10-CM

## 2023-04-18 DIAGNOSIS — M25.511 CHRONIC RIGHT SHOULDER PAIN: Primary | ICD-10-CM

## 2023-04-18 PROCEDURE — 99213 OFFICE O/P EST LOW 20 MIN: CPT | Mod: S$PBB,,, | Performed by: FAMILY MEDICINE

## 2023-04-18 PROCEDURE — 99999 PR PBB SHADOW E&M-EST. PATIENT-LVL III: ICD-10-PCS | Mod: PBBFAC,,, | Performed by: FAMILY MEDICINE

## 2023-04-18 PROCEDURE — 99213 PR OFFICE/OUTPT VISIT, EST, LEVL III, 20-29 MIN: ICD-10-PCS | Mod: S$PBB,,, | Performed by: FAMILY MEDICINE

## 2023-04-18 PROCEDURE — 99213 OFFICE O/P EST LOW 20 MIN: CPT | Mod: PBBFAC,PN | Performed by: FAMILY MEDICINE

## 2023-04-18 PROCEDURE — 99999 PR PBB SHADOW E&M-EST. PATIENT-LVL III: CPT | Mod: PBBFAC,,, | Performed by: FAMILY MEDICINE

## 2023-04-18 NOTE — PROGRESS NOTES
Subjective     Patient ID: Cinthya Cuevas is a 88 y.o. female.    Chief Complaint: Shoulder Pain    Urgent care visit    Acute on Chronic right shoulder pain and insomnia:  Patient follows with ortho.  She gets injections once every 3-4 months.  States last injection was about 3 months ago, and it is wearing off.  Patient states she is unsure when is her next appointment.  Patient here today requesting a referral to possibly pain management.  Patient has a specific place she would like to try, but is open to any pain management facility.  States she currently does  PT at the independent living facility and utilizes topical lidocaine patches, which only last two hours per use.  States her pain is worse at night can't get comfortable to sleep.  She has Elavil at home p.r.n. sleep, but does not use it because she did not want to become addicted to medication.  Patient is also concerned because along with being painful, her right shoulder is weak.  States it takes her multiple attempts to go from sitting to standing.  There is a single grab bar in her bathroom.  Double grab bars are not covered by her insurance.  It is hard to pull her pants up because of the pain in her right shoulder.  Patient has fallen twice because of the limitations of her right shoulder, and she is afraid of falling again.  Patient would like to remain as independent as possible, which prompted her request for pain management.  States she was on pain medications many many years ago.    Review of Systems   Constitutional:  Positive for activity change. Negative for appetite change, chills, fatigue, fever and unexpected weight change.   HENT:  Negative for ear discharge, ear pain, hearing loss, mouth dryness, mouth sores, postnasal drip, rhinorrhea, sinus pressure/congestion, sneezing, sore throat and trouble swallowing.    Eyes:  Negative for photophobia, pain, discharge, redness and itching.   Respiratory:  Negative for cough, chest  tightness, shortness of breath and wheezing.    Cardiovascular:  Negative for chest pain, palpitations and leg swelling.   Gastrointestinal:  Negative for abdominal pain, blood in stool, constipation, diarrhea, nausea and vomiting.   Endocrine: Negative for cold intolerance, heat intolerance, polydipsia, polyphagia and polyuria.   Genitourinary:  Negative for bladder incontinence, difficulty urinating, dysuria, frequency, hematuria, nocturia and urgency.   Musculoskeletal:  Positive for arthralgias, back pain, gait problem, myalgias and joint deformity. Negative for joint swelling.   Integumentary:  Negative for rash and wound.   Neurological:  Negative for dizziness, vertigo, syncope, weakness, numbness and headaches.   Psychiatric/Behavioral:  Negative for agitation, behavioral problems, confusion, dysphoric mood, self-injury, sleep disturbance and suicidal ideas. The patient is not nervous/anxious and is not hyperactive.         Objective     Physical Exam  Vitals reviewed.   Constitutional:       General: She is not in acute distress.     Appearance: She is not ill-appearing or toxic-appearing.   Cardiovascular:      Rate and Rhythm: Normal rate and regular rhythm.      Pulses: Normal pulses.      Heart sounds: Normal heart sounds.   Pulmonary:      Effort: Pulmonary effort is normal.      Breath sounds: Normal breath sounds.   Musculoskeletal:      Comments: Patient's right shoulder is significantly elevated compared to left.  Patient wearing lidocaine patch today.  Gross deformity of arthritic changes to right shoulder present.  Limited range of motion right shoulder.  Get up and go test: Patient took 4-5 attempts to rise herself up from the chair she then needed to utilize the exam table for stability before grabbing onto her wheeled walker.  Patient has kyphosis of spine   Neurological:      General: No focal deficit present.      Mental Status: She is alert and oriented to person, place, and time.    Psychiatric:         Mood and Affect: Mood normal.         Behavior: Behavior normal.        Assessment and Plan     Problem List Items Addressed This Visit          Orthopedic    Chronic right shoulder pain - Primary    Relevant Orders    Ambulatory referral/consult to Pain Clinic     Other Visit Diagnoses       Fall, sequela        Relevant Orders    Ambulatory referral/consult to Pain Clinic        Chart reviewed  Advised patient that due to her chronic kidney disease and her diabetes I am unable to give her a steroid or Toradol injection in office today  We will place referral to pain management of patient's choice 1st, but will also reach out to patient's PCP to see what she recommends for pain management going forward since she has better knowledge of patient.  Advised patient to try Elavil 30 minutes before bedtime to see if that will help alleviate some of the pain and help her to sleep at night.  Advised patient to reach back out to ortho as well to see if they can do a shoulder injection.  Advised her that she may need to be on a every three-month recall instead of every 4 month recall if injection seems to wear off by the 3rd month.  Patient states she will call them when she gets home.  Patient emphasized that she is determined to remain as independent as possible at her living facility.  States any extra resources and/or care provided by the facility results and additional charges to patient, which she is not interested in acquiring at this time.  Risks, benefits, and side effects were discussed with the patient. All questions were answered to the fullest satisfaction of the patient, and pt verbalized understanding and agreement to treatment plan. Pt was to call PCP with any new or worsening symptoms, or present to the ER.         Libertad Reece MD  Family Medicine Physician   [facility]     Total time spent 20 minutes    This note was created using [employer] voice recognition  software that occasionally may misinterpret phrases or words.

## 2023-04-19 PROBLEM — N18.31 CHRONIC KIDNEY DISEASE, STAGE 3A: Status: ACTIVE | Noted: 2023-04-19

## 2023-04-29 PROCEDURE — G0179 MD RECERTIFICATION HHA PT: HCPCS | Mod: ,,, | Performed by: FAMILY MEDICINE

## 2023-04-29 PROCEDURE — G0179 PR HOME HEALTH MD RECERTIFICATION: ICD-10-PCS | Mod: ,,, | Performed by: FAMILY MEDICINE

## 2023-05-06 ENCOUNTER — DOCUMENT SCAN (OUTPATIENT)
Dept: HOME HEALTH SERVICES | Facility: HOSPITAL | Age: 88
End: 2023-05-06
Payer: MEDICARE

## 2023-05-08 ENCOUNTER — EXTERNAL HOME HEALTH (OUTPATIENT)
Dept: HOME HEALTH SERVICES | Facility: HOSPITAL | Age: 88
End: 2023-05-08
Payer: MEDICARE

## 2023-05-08 ENCOUNTER — OFFICE VISIT (OUTPATIENT)
Dept: FAMILY MEDICINE | Facility: CLINIC | Age: 88
End: 2023-05-08
Payer: MEDICARE

## 2023-05-08 VITALS
SYSTOLIC BLOOD PRESSURE: 122 MMHG | HEART RATE: 68 BPM | HEIGHT: 62 IN | BODY MASS INDEX: 23.37 KG/M2 | OXYGEN SATURATION: 97 % | DIASTOLIC BLOOD PRESSURE: 74 MMHG | WEIGHT: 127 LBS | TEMPERATURE: 98 F

## 2023-05-08 DIAGNOSIS — C44.90 SKIN CANCER: ICD-10-CM

## 2023-05-08 DIAGNOSIS — Z02.2 ENCOUNTER FOR EXAMINATION FOR ADMISSION TO ASSISTED LIVING FACILITY: Primary | ICD-10-CM

## 2023-05-08 PROBLEM — M17.11 OSTEOARTHRITIS OF RIGHT KNEE: Status: ACTIVE | Noted: 2023-05-08

## 2023-05-08 PROBLEM — M75.40 IMPINGEMENT SYNDROME OF SHOULDER REGION: Status: ACTIVE | Noted: 2023-05-08

## 2023-05-08 PROBLEM — M10.9 GOUT: Status: ACTIVE | Noted: 2023-05-08

## 2023-05-08 PROBLEM — I10 HYPERTENSION: Status: ACTIVE | Noted: 2023-05-08

## 2023-05-08 PROBLEM — I25.10 ARTERIOSCLEROSIS OF CORONARY ARTERY: Status: ACTIVE | Noted: 2023-05-08

## 2023-05-08 PROBLEM — M19.011 OSTEOARTHRITIS OF RIGHT SHOULDER: Status: ACTIVE | Noted: 2023-05-08

## 2023-05-08 PROBLEM — D63.8 ANEMIA OF CHRONIC DISEASE: Status: ACTIVE | Noted: 2023-05-08

## 2023-05-08 PROBLEM — E78.5 HYPERLIPIDEMIA: Status: ACTIVE | Noted: 2023-05-08

## 2023-05-08 PROBLEM — I87.2 PERIPHERAL VENOUS INSUFFICIENCY: Status: ACTIVE | Noted: 2023-05-08

## 2023-05-08 PROBLEM — M51.36 DEGENERATION OF INTERVERTEBRAL DISC OF LUMBAR REGION: Status: ACTIVE | Noted: 2023-05-08

## 2023-05-08 PROBLEM — K21.9 GASTROESOPHAGEAL REFLUX DISEASE: Status: ACTIVE | Noted: 2023-05-08

## 2023-05-08 PROBLEM — N18.30 STAGE 3 CHRONIC KIDNEY DISEASE: Status: ACTIVE | Noted: 2023-04-19

## 2023-05-08 PROBLEM — I65.29 STENOSIS OF CAROTID ARTERY: Status: ACTIVE | Noted: 2023-05-08

## 2023-05-08 PROBLEM — G47.00 INSOMNIA: Status: ACTIVE | Noted: 2023-05-08

## 2023-05-08 PROBLEM — M51.369 DEGENERATION OF INTERVERTEBRAL DISC OF LUMBAR REGION: Status: ACTIVE | Noted: 2023-05-08

## 2023-05-08 PROBLEM — M46.1 SACROILIITIS: Status: ACTIVE | Noted: 2023-05-08

## 2023-05-08 PROCEDURE — 99212 PR OFFICE/OUTPT VISIT, EST, LEVL II, 10-19 MIN: ICD-10-PCS | Mod: S$PBB,,, | Performed by: FAMILY MEDICINE

## 2023-05-08 PROCEDURE — 99212 OFFICE O/P EST SF 10 MIN: CPT | Mod: S$PBB,,, | Performed by: FAMILY MEDICINE

## 2023-05-08 PROCEDURE — 99999 PR PBB SHADOW E&M-EST. PATIENT-LVL III: CPT | Mod: PBBFAC,,, | Performed by: FAMILY MEDICINE

## 2023-05-08 PROCEDURE — 99999 PR PBB SHADOW E&M-EST. PATIENT-LVL III: ICD-10-PCS | Mod: PBBFAC,,, | Performed by: FAMILY MEDICINE

## 2023-05-08 PROCEDURE — 99213 OFFICE O/P EST LOW 20 MIN: CPT | Mod: PBBFAC,PN | Performed by: FAMILY MEDICINE

## 2023-05-08 RX ORDER — FLUOROURACIL 50 MG/G
CREAM TOPICAL 2 TIMES DAILY
COMMUNITY
Start: 2023-04-21 | End: 2023-06-14

## 2023-05-08 RX ORDER — CLOBETASOL PROPIONATE 0.5 MG/G
OINTMENT TOPICAL 2 TIMES DAILY
COMMUNITY
Start: 2023-05-02 | End: 2023-06-14

## 2023-05-08 NOTE — PROGRESS NOTES
Subjective     Patient ID: Cinthya Cuevas is a 88 y.o. female.    Chief Complaint: Annual Exam    Patient needed paperwork completed to remain at her assisted living facility.  Patient states she remains independent of her ADLs and IADLs.  Patient denies any memory issues.  No change to her chronic conditions, or medications.  Has upcoming appointment with her PCP in June.  Patient still follows with Dermatology for her skin cancer.  Patient states her shoulder is not bothering her today.  She followed up with ortho and they gave her a steroid injection.  Patient states she can go every 3 months for steroid injections.  Patient states she would like to keep it ever advance directive she has on file at her assisted living facility did not want to make any changes to it today    Review of Systems   Constitutional:  Negative for activity change, appetite change, chills, diaphoresis, fatigue, fever and unexpected weight change.   Respiratory:  Negative for cough, choking and chest tightness.    Cardiovascular:  Negative for chest pain, palpitations, leg swelling and claudication.   Gastrointestinal:  Negative for abdominal pain, change in bowel habit, constipation, diarrhea, nausea, vomiting and change in bowel habit.   Psychiatric/Behavioral:  Negative for dysphoric mood, self-injury, sleep disturbance and suicidal ideas. The patient is not nervous/anxious.         Objective     Physical Exam  Vitals reviewed.   Constitutional:       General: She is not in acute distress.     Appearance: Normal appearance. She is normal weight. She is not ill-appearing or toxic-appearing.   Cardiovascular:      Rate and Rhythm: Normal rate and regular rhythm.      Pulses: Normal pulses.      Heart sounds: Murmur (Holosystolic) heard.   Pulmonary:      Effort: Pulmonary effort is normal.      Breath sounds: Normal breath sounds.   Neurological:      General: No focal deficit present.      Mental Status: She is alert and oriented to  person, place, and time.   Psychiatric:         Mood and Affect: Mood normal.         Behavior: Behavior normal.          Assessment and Plan     Problem List Items Addressed This Visit          Oncology    Skin cancer     Other Visit Diagnoses       Encounter for examination for admission to assisted living facility    -  Primary        Paperwork completed.  Patient denies any knowledge of any allergies to medications, but there are medications listed on her chart as allergies.  Advised to discuss with PCP at next visit. Advised patient that I am unable to see her advanced directives and that she should ask the assisted living facility to review it with her.  Advised that I am documenting in her paperwork that she has today that she is to review that with the assisted living facility as she does not want any changes so what is currently there.  Patient placed on labs schedule to have lab drawn prior to her upcoming appointment with her PCP.   All questions were answered to the fullest satisfaction of the patient, and pt verbalized understanding and agreement to treatment plan. Pt was to call with any new or worsening symptoms, or present to the ER.         Libertad Reece MD  Family Medicine Physician   [facility]     This note was created using [employer] voice recognition software that occasionally may misinterpret phrases or words.

## 2023-05-15 ENCOUNTER — OFFICE VISIT (OUTPATIENT)
Dept: PODIATRY | Facility: CLINIC | Age: 88
End: 2023-05-15
Payer: MEDICARE

## 2023-05-15 VITALS
WEIGHT: 127 LBS | HEART RATE: 70 BPM | BODY MASS INDEX: 23.37 KG/M2 | HEIGHT: 62 IN | RESPIRATION RATE: 16 BRPM | DIASTOLIC BLOOD PRESSURE: 68 MMHG | SYSTOLIC BLOOD PRESSURE: 121 MMHG

## 2023-05-15 DIAGNOSIS — M21.621 TAILOR'S BUNIONETTE, RIGHT: Primary | ICD-10-CM

## 2023-05-15 DIAGNOSIS — L84 PRE-ULCERATIVE CALLUSES: ICD-10-CM

## 2023-05-15 DIAGNOSIS — E11.59 CONTROLLED TYPE 2 DIABETES MELLITUS WITH OTHER CIRCULATORY COMPLICATION, UNSPECIFIED WHETHER LONG TERM INSULIN USE: ICD-10-CM

## 2023-05-15 DIAGNOSIS — R60.0 EDEMA OF BOTH LOWER EXTREMITIES: ICD-10-CM

## 2023-05-15 PROCEDURE — 99999 PR PBB SHADOW E&M-EST. PATIENT-LVL IV: CPT | Mod: PBBFAC,,, | Performed by: PODIATRIST

## 2023-05-15 PROCEDURE — 99999 PR PBB SHADOW E&M-EST. PATIENT-LVL IV: ICD-10-PCS | Mod: PBBFAC,,, | Performed by: PODIATRIST

## 2023-05-15 PROCEDURE — 99213 PR OFFICE/OUTPT VISIT, EST, LEVL III, 20-29 MIN: ICD-10-PCS | Mod: S$PBB,,, | Performed by: PODIATRIST

## 2023-05-15 PROCEDURE — 99213 OFFICE O/P EST LOW 20 MIN: CPT | Mod: S$PBB,,, | Performed by: PODIATRIST

## 2023-05-15 PROCEDURE — 99214 OFFICE O/P EST MOD 30 MIN: CPT | Mod: PBBFAC,PN | Performed by: PODIATRIST

## 2023-05-16 NOTE — PROGRESS NOTES
Subjective:       Patient ID: Cinthya Cuevas is a 88 y.o. female.    Chief Complaint: Follow-up, Foot Ulcer, Foot Pain, and Diabetes Mellitus  Patient presents for follow up pain on the outside of the 5th digit. Relates improvement, but still recurring. A lot less shooting pain at night.  Trying to stay active, go out a few days a week, still driving.  No changes in health since last visit. Pain right foot down from 8 to 4/10      Past Medical History:   Diagnosis Date    Anemia     Arthritis     Diabetes mellitus     Encounter for blood transfusion     Impaired mobility      Past Surgical History:   Procedure Laterality Date    APPENDECTOMY      BACK SURGERY      3/2015    CHOLECYSTECTOMY      COLONOSCOPY      EYE SURGERY Bilateral     cataract    FRACTURE SURGERY      right ankle     HYSTERECTOMY      JOINT REPLACEMENT      left hip, right knee     History reviewed. No pertinent family history.  Social History     Socioeconomic History    Marital status:    Tobacco Use    Smoking status: Former     Packs/day: 0.25     Years: 31.00     Pack years: 7.75     Types: Cigarettes     Start date: 1957     Quit date: 1988     Years since quittin.3    Smokeless tobacco: Never   Substance and Sexual Activity    Alcohol use: No    Drug use: No       Current Outpatient Medications   Medication Sig Dispense Refill    atorvastatin (LIPITOR) 40 MG tablet Take 1 tablet (40 mg total) by mouth every evening. 90 tablet 3    clobetasol 0.05% (TEMOVATE) 0.05 % Oint Apply topically 2 (two) times daily.      diclofenac sodium (VOLTAREN) 1 % Gel   APPLY 4 GRAMS TOPICALLY 4 (FOUR) TIMES DAILY., # 400 g, 3 total refill(s), Acute, [facility] [Federal Rx: #400 last filled 22]      EScitalopram oxalate (LEXAPRO) 10 MG tablet Take 1.5 tablets by mouth once daily.      fluorouraciL (EFUDEX) 5 % cream Apply topically 2 (two) times daily.      FREESTYLE INSULINX Misc       FREESTYLE LANCETS 28 gauge lancets        "FREESTYLE LITE STRIPS Strp       LIDOcaine (LIDODERM) 5 % Place 1 patch onto the skin once daily. Remove & Discard patch within 12 hours or as directed by MD 90 patch 0    metFORMIN (GLUCOPHAGE-XR) 500 MG ER 24hr tablet Take 500 mg by mouth.      multivitamin/iron/folic acid (CENTRUM WOMEN ORAL) Take by mouth.      oxybutynin (DITROPAN-XL) 10 MG 24 hr tablet Take 1 tablet (10 mg total) by mouth every evening. For overactive bladder 90 tablet 3    SITagliptin phosphate (JANUVIA) 100 MG Tab Take 1 tablet (100 mg total) by mouth once daily. 90 tablet 3    traZODone (DESYREL) 50 MG tablet TAKE 1 TABLET (50 MG TOTAL) BY MOUTH NIGHTLY AS NEEDED FOR INSOMNIA., # 90 EA, 3 total refill(s), Acute, [facility] [Federal Rx: #90 last filled 03/23/22] 90 tablet 3     No current facility-administered medications for this visit.     Review of patient's allergies indicates:   Allergen Reactions    Cefaclor      rash    Piroxicam Hives     rash         Review of Systems   HENT:  Negative for congestion.         Band-Aid nose, Dermatology removed lesion recently   Respiratory:  Negative for cough.    Cardiovascular:  Positive for leg swelling.   Musculoskeletal:  Positive for gait problem.        Walker   All other systems reviewed and are negative.    Objective:      Vitals:    05/15/23 1158   BP: 121/68   Pulse: 70   Resp: 16   Weight: 57.6 kg (127 lb)   Height: 5' 2" (1.575 m)     Physical Exam  Vitals and nursing note reviewed.   Constitutional:       General: She is not in acute distress.     Appearance: Normal appearance.   Cardiovascular:      Pulses:           Dorsalis pedis pulses are 1+ on the right side and 1+ on the left side.        Posterior tibial pulses are 0 on the right side and 0 on the left side.   Pulmonary:      Effort: Pulmonary effort is normal.   Musculoskeletal:         General: Tenderness present.      Right lower leg: Edema present.      Left lower leg: Edema present.      Right foot: Decreased range of motion " (Arthritic and degenerative changes bilateral feet). Bunion (mild HAV and tailor's bunion bilateral) present.      Left foot: Decreased range of motion. Bunion present.      Comments: Decreased pain lateral 5th metatarsal head. Limited mobility   Feet:      Right foot:      Skin integrity: Callus (pre ulcer hyperkeratotic tissue lateral 5th met head right with IPK, tender, improved, no discoloration) present. No skin breakdown.      Left foot:      Skin integrity: No skin breakdown, erythema or callus.   Skin:     Capillary Refill: Capillary refill takes 2 to 3 seconds.   Neurological:      General: No focal deficit present.      Mental Status: She is alert.   Psychiatric:         Mood and Affect: Mood normal.         Behavior: Behavior normal.         Thought Content: Thought content normal.         Judgment: Judgment normal.                          Assessment:       1. Tailor's bunionette, right    2. Pre-ulcerative calluses    3. Edema of both lower extremities    4. Controlled type 2 diabetes mellitus with other circulatory complication, unspecified whether long term insulin use          Plan:         Advised patient area is improved, pain and swelling improved  We discussed use of Voltaren gel in this area to help with inflammation underlying this area  Instructed patient on use of Voltaren gel 3 times daily  Patient understands area needs to be kept dry, monitor daily, contact the office with any changes especially if there is any discoloration noted within callus  With a previous history of ulcer between the 4th and 5th toes patient understands potential complications regarding an open sore developing in this location  Explained to patient swelling in her feet and legs has heavily contributed to pressure in this location.  Elevate when at home.  Would recommend she discuss swelling in her feet and legs with her PCP  Debridement of pre ulcerative callus lateral 5th met head right foot.   Stable at this time,  will need follow-up in 1 month, patient understands controlling/treating callus is crucial to control pain in help prevent complications  Reviewed diabetic education and daily foot checks  Patient was in understanding and agreement with treatment plan.  I counseled the patient on their conditions, implications and medical management.  Instructed patient to contact the office with any changes, questions, concerns, worsening of symptoms.   Total face to face time 20 minutes, exam, assessment, treatment, discussion, additional time for review of chart prior to and following appointment and visit documentation, consultation and coordination of care.   Follow up 1 month    This note was created using [employer] voice recognition software that occasionally misinterpreted phrases or words.

## 2023-05-24 ENCOUNTER — TELEPHONE (OUTPATIENT)
Dept: FAMILY MEDICINE | Facility: CLINIC | Age: 88
End: 2023-05-24
Payer: MEDICARE
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
## 2023-05-24 DIAGNOSIS — M25.519 ACUTE SHOULDER PAIN, UNSPECIFIED LATERALITY: ICD-10-CM

## 2023-05-24 NOTE — TELEPHONE ENCOUNTER
----- Message from Mary Jo Garcia sent at 5/23/2023  4:14 PM CDT -----  Contact: Occupational therapist  Type:  Needs Medical Advice    Who Called: Occupational therapist     Symptoms (please be specific): Patient fall ( on shoulder)     How long has patient had these symptoms:  05/22/2023    Would the patient rather a call back or a response via [facility]? Call    Best Call Back Number: [phone] ( Andree, Therapist with [facility])    Additional Information: Assisted living did address the situation but the therapist did want to report the incident. Patient states that she felt fine but was instructed to call if she experienced any issues .    Therapist is also requesting new orders because of this fall

## 2023-05-24 NOTE — TELEPHONE ENCOUNTER
See note below. Patient has appointment scheduled for around 3 weeks away. Order for OT placed. Please send to [facility], see therapist name below. Will see pt sooner if needed. Keep June appointment.

## 2023-05-31 ENCOUNTER — LAB VISIT (OUTPATIENT)
Dept: LAB | Facility: HOSPITAL | Age: 88
End: 2023-05-31
Payer: MEDICARE

## 2023-05-31 DIAGNOSIS — R35.1 NOCTURIA: ICD-10-CM

## 2023-05-31 DIAGNOSIS — Z79.899 ENCOUNTER FOR LONG-TERM CURRENT USE OF MEDICATION: ICD-10-CM

## 2023-05-31 DIAGNOSIS — E11.49 TYPE II DIABETES MELLITUS WITH NEUROLOGICAL MANIFESTATIONS: ICD-10-CM

## 2023-05-31 LAB
ALBUMIN SERPL BCP-MCNC: 3.6 G/DL (ref 3.5–5.2)
ALP SERPL-CCNC: 70 U/L (ref 55–135)
ALT SERPL W/O P-5'-P-CCNC: 18 U/L (ref 10–44)
ANION GAP SERPL CALC-SCNC: 9 MMOL/L (ref 8–16)
AST SERPL-CCNC: 16 U/L (ref 10–40)
BASOPHILS # BLD AUTO: 0.04 K/UL (ref 0–0.2)
BASOPHILS NFR BLD: 0.7 % (ref 0–1.9)
BILIRUB SERPL-MCNC: 0.5 MG/DL (ref 0.1–1)
BUN SERPL-MCNC: 22 MG/DL (ref 8–23)
CALCIUM SERPL-MCNC: 9.4 MG/DL (ref 8.7–10.5)
CHLORIDE SERPL-SCNC: 104 MMOL/L (ref 95–110)
CHOLEST SERPL-MCNC: 137 MG/DL (ref 120–199)
CHOLEST/HDLC SERPL: 2.7 {RATIO} (ref 2–5)
CO2 SERPL-SCNC: 27 MMOL/L (ref 23–29)
CREAT SERPL-MCNC: 1 MG/DL (ref 0.5–1.4)
DIFFERENTIAL METHOD: ABNORMAL
EOSINOPHIL # BLD AUTO: 0.1 K/UL (ref 0–0.5)
EOSINOPHIL NFR BLD: 2.4 % (ref 0–8)
ERYTHROCYTE [DISTWIDTH] IN BLOOD BY AUTOMATED COUNT: 15 % (ref 11.5–14.5)
EST. GFR  (NO RACE VARIABLE): 54.2 ML/MIN/1.73 M^2
ESTIMATED AVG GLUCOSE: 131 MG/DL (ref 68–131)
GLUCOSE SERPL-MCNC: 131 MG/DL (ref 70–110)
HBA1C MFR BLD: 6.2 % (ref 4–5.6)
HCT VFR BLD AUTO: 28.9 % (ref 37–48.5)
HDLC SERPL-MCNC: 50 MG/DL (ref 40–75)
HDLC SERPL: 36.5 % (ref 20–50)
HGB BLD-MCNC: 9 G/DL (ref 12–16)
IMM GRANULOCYTES # BLD AUTO: 0.05 K/UL (ref 0–0.04)
IMM GRANULOCYTES NFR BLD AUTO: 0.9 % (ref 0–0.5)
LDLC SERPL CALC-MCNC: 65.6 MG/DL (ref 63–159)
LYMPHOCYTES # BLD AUTO: 1.2 K/UL (ref 1–4.8)
LYMPHOCYTES NFR BLD: 20 % (ref 18–48)
MAGNESIUM SERPL-MCNC: 1.8 MG/DL (ref 1.6–2.6)
MCH RBC QN AUTO: 28.7 PG (ref 27–31)
MCHC RBC AUTO-ENTMCNC: 31.1 G/DL (ref 32–36)
MCV RBC AUTO: 92 FL (ref 82–98)
MONOCYTES # BLD AUTO: 0.4 K/UL (ref 0.3–1)
MONOCYTES NFR BLD: 7.4 % (ref 4–15)
NEUTROPHILS # BLD AUTO: 4 K/UL (ref 1.8–7.7)
NEUTROPHILS NFR BLD: 68.6 % (ref 38–73)
NONHDLC SERPL-MCNC: 87 MG/DL
NRBC BLD-RTO: 0 /100 WBC
PLATELET # BLD AUTO: 188 K/UL (ref 150–450)
PMV BLD AUTO: 10.5 FL (ref 9.2–12.9)
POTASSIUM SERPL-SCNC: 4.1 MMOL/L (ref 3.5–5.1)
PROT SERPL-MCNC: 6.4 G/DL (ref 6–8.4)
RBC # BLD AUTO: 3.14 M/UL (ref 4–5.4)
SODIUM SERPL-SCNC: 140 MMOL/L (ref 136–145)
TRIGL SERPL-MCNC: 107 MG/DL (ref 30–150)
WBC # BLD AUTO: 5.8 K/UL (ref 3.9–12.7)

## 2023-05-31 PROCEDURE — 83735 ASSAY OF MAGNESIUM: CPT | Performed by: FAMILY MEDICINE

## 2023-05-31 PROCEDURE — 85025 COMPLETE CBC W/AUTO DIFF WBC: CPT | Performed by: FAMILY MEDICINE

## 2023-05-31 PROCEDURE — 87086 URINE CULTURE/COLONY COUNT: CPT | Performed by: FAMILY MEDICINE

## 2023-05-31 PROCEDURE — 82607 VITAMIN B-12: CPT | Performed by: FAMILY MEDICINE

## 2023-05-31 PROCEDURE — 80061 LIPID PANEL: CPT | Performed by: FAMILY MEDICINE

## 2023-05-31 PROCEDURE — 36415 COLL VENOUS BLD VENIPUNCTURE: CPT | Performed by: FAMILY MEDICINE

## 2023-05-31 PROCEDURE — 83036 HEMOGLOBIN GLYCOSYLATED A1C: CPT | Performed by: FAMILY MEDICINE

## 2023-05-31 PROCEDURE — 80053 COMPREHEN METABOLIC PANEL: CPT | Performed by: FAMILY MEDICINE

## 2023-06-01 LAB
BACTERIA UR CULT: NO GROWTH
VIT B12 SERPL-MCNC: 503 PG/ML (ref 210–950)

## 2023-06-13 ENCOUNTER — TELEPHONE (OUTPATIENT)
Dept: FAMILY MEDICINE | Facility: CLINIC | Age: 88
End: 2023-06-13
Payer: MEDICARE

## 2023-06-13 RX ORDER — METFORMIN HYDROCHLORIDE 500 MG/1
TABLET ORAL
COMMUNITY
Start: 2023-01-17 | End: 2023-06-14 | Stop reason: SDUPTHER

## 2023-06-13 NOTE — TELEPHONE ENCOUNTER
----- Message from Lisa Mukherjee sent at 6/13/2023  9:01 AM CDT -----  Contact: Zoltan Mcadams  Type:  Needs Medical Advice    Who Called: zoltan from [facility]   Would the patient rather a call back or a response via [facility]? call  Best Call Back Number:   Additional Information: Pt had a fall and was sent to er for further assessment, and pt also had a injury to the head. Also wants to talk to nurse about getting her a scooter. Please advise and thank you.

## 2023-06-14 ENCOUNTER — OFFICE VISIT (OUTPATIENT)
Dept: PODIATRY | Facility: CLINIC | Age: 88
End: 2023-06-14
Payer: MEDICARE

## 2023-06-14 ENCOUNTER — TELEPHONE (OUTPATIENT)
Dept: PODIATRY | Facility: CLINIC | Age: 88
End: 2023-06-14
Payer: MEDICARE

## 2023-06-14 ENCOUNTER — OFFICE VISIT (OUTPATIENT)
Dept: FAMILY MEDICINE | Facility: CLINIC | Age: 88
End: 2023-06-14
Payer: MEDICARE

## 2023-06-14 VITALS
DIASTOLIC BLOOD PRESSURE: 69 MMHG | SYSTOLIC BLOOD PRESSURE: 142 MMHG | WEIGHT: 129 LBS | BODY MASS INDEX: 23.74 KG/M2 | HEART RATE: 63 BPM | RESPIRATION RATE: 16 BRPM | HEIGHT: 62 IN

## 2023-06-14 VITALS — DIASTOLIC BLOOD PRESSURE: 68 MMHG | SYSTOLIC BLOOD PRESSURE: 138 MMHG | HEART RATE: 62 BPM | OXYGEN SATURATION: 97 %

## 2023-06-14 DIAGNOSIS — L84 PRE-ULCERATIVE CALLUSES: ICD-10-CM

## 2023-06-14 DIAGNOSIS — Z91.81 AT HIGH RISK FOR INJURY RELATED TO FALL: ICD-10-CM

## 2023-06-14 DIAGNOSIS — E11.42 DIABETIC PERIPHERAL NEUROPATHY ASSOCIATED WITH TYPE 2 DIABETES MELLITUS: ICD-10-CM

## 2023-06-14 DIAGNOSIS — G89.29 CHRONIC RIGHT HIP PAIN: ICD-10-CM

## 2023-06-14 DIAGNOSIS — R26.81 UNSTEADY GAIT: ICD-10-CM

## 2023-06-14 DIAGNOSIS — Z85.828 HISTORY OF SKIN CANCER: ICD-10-CM

## 2023-06-14 DIAGNOSIS — G89.29 CHRONIC RIGHT SHOULDER PAIN: ICD-10-CM

## 2023-06-14 DIAGNOSIS — R53.1 WEAKNESS: ICD-10-CM

## 2023-06-14 DIAGNOSIS — R35.1 NOCTURIA: ICD-10-CM

## 2023-06-14 DIAGNOSIS — E11.59 CONTROLLED TYPE 2 DIABETES MELLITUS WITH OTHER CIRCULATORY COMPLICATION, UNSPECIFIED WHETHER LONG TERM INSULIN USE: ICD-10-CM

## 2023-06-14 DIAGNOSIS — M25.551 CHRONIC RIGHT HIP PAIN: ICD-10-CM

## 2023-06-14 DIAGNOSIS — N32.81 OVERACTIVE BLADDER: ICD-10-CM

## 2023-06-14 DIAGNOSIS — M19.011 OSTEOARTHRITIS OF RIGHT SHOULDER, UNSPECIFIED OSTEOARTHRITIS TYPE: ICD-10-CM

## 2023-06-14 DIAGNOSIS — M25.511 CHRONIC RIGHT SHOULDER PAIN: ICD-10-CM

## 2023-06-14 DIAGNOSIS — E78.2 MIXED HYPERLIPIDEMIA: ICD-10-CM

## 2023-06-14 DIAGNOSIS — E11.49 TYPE II DIABETES MELLITUS WITH NEUROLOGICAL MANIFESTATIONS: Primary | ICD-10-CM

## 2023-06-14 DIAGNOSIS — M25.50 ARTHRALGIA, UNSPECIFIED JOINT: ICD-10-CM

## 2023-06-14 DIAGNOSIS — R60.0 EDEMA OF BOTH LOWER EXTREMITIES: ICD-10-CM

## 2023-06-14 DIAGNOSIS — M21.621 TAILOR'S BUNIONETTE, RIGHT: Primary | ICD-10-CM

## 2023-06-14 DIAGNOSIS — G47.00 INSOMNIA, UNSPECIFIED TYPE: ICD-10-CM

## 2023-06-14 DIAGNOSIS — D64.9 ANEMIA, UNSPECIFIED TYPE: ICD-10-CM

## 2023-06-14 DIAGNOSIS — E53.8 LOW SERUM VITAMIN B12: ICD-10-CM

## 2023-06-14 DIAGNOSIS — R60.9 DEPENDENT EDEMA: ICD-10-CM

## 2023-06-14 PROCEDURE — 99213 OFFICE O/P EST LOW 20 MIN: CPT | Mod: S$PBB,,, | Performed by: PODIATRIST

## 2023-06-14 PROCEDURE — 99999 PR PBB SHADOW E&M-EST. PATIENT-LVL V: ICD-10-PCS | Mod: PBBFAC,,, | Performed by: PODIATRIST

## 2023-06-14 PROCEDURE — 99999 PR PBB SHADOW E&M-EST. PATIENT-LVL III: CPT | Mod: PBBFAC,,, | Performed by: FAMILY MEDICINE

## 2023-06-14 PROCEDURE — 99213 OFFICE O/P EST LOW 20 MIN: CPT | Mod: PBBFAC,PN | Performed by: FAMILY MEDICINE

## 2023-06-14 PROCEDURE — 99999 PR PBB SHADOW E&M-EST. PATIENT-LVL V: CPT | Mod: PBBFAC,,, | Performed by: PODIATRIST

## 2023-06-14 PROCEDURE — 99213 PR OFFICE/OUTPT VISIT, EST, LEVL III, 20-29 MIN: ICD-10-PCS | Mod: S$PBB,,, | Performed by: PODIATRIST

## 2023-06-14 PROCEDURE — 99999 PR PBB SHADOW E&M-EST. PATIENT-LVL III: ICD-10-PCS | Mod: PBBFAC,,, | Performed by: FAMILY MEDICINE

## 2023-06-14 PROCEDURE — 99215 PR OFFICE/OUTPT VISIT, EST, LEVL V, 40-54 MIN: ICD-10-PCS | Mod: S$PBB,,, | Performed by: FAMILY MEDICINE

## 2023-06-14 PROCEDURE — 99215 OFFICE O/P EST HI 40 MIN: CPT | Mod: PBBFAC,27,PN | Performed by: PODIATRIST

## 2023-06-14 PROCEDURE — 96372 THER/PROPH/DIAG INJ SC/IM: CPT | Mod: PBBFAC,PN

## 2023-06-14 PROCEDURE — 99215 OFFICE O/P EST HI 40 MIN: CPT | Mod: S$PBB,,, | Performed by: FAMILY MEDICINE

## 2023-06-14 RX ORDER — GABAPENTIN 300 MG/1
300 CAPSULE ORAL
COMMUNITY
End: 2023-06-14 | Stop reason: SDUPTHER

## 2023-06-14 RX ORDER — ESCITALOPRAM OXALATE 10 MG/1
10 TABLET ORAL DAILY
Qty: 90 TABLET | Refills: 3 | Status: SHIPPED | OUTPATIENT
Start: 2023-06-14 | End: 2023-10-18 | Stop reason: DRUGHIGH

## 2023-06-14 RX ORDER — FUROSEMIDE 40 MG/1
40 TABLET ORAL
COMMUNITY
End: 2023-06-14 | Stop reason: SDUPTHER

## 2023-06-14 RX ORDER — SIMVASTATIN 40 MG/1
40 TABLET, FILM COATED ORAL
COMMUNITY
End: 2023-06-14

## 2023-06-14 RX ORDER — ASPIRIN 81 MG/1
81 TABLET ORAL
COMMUNITY
End: 2023-06-14

## 2023-06-14 RX ORDER — TRAZODONE HYDROCHLORIDE 50 MG/1
50 TABLET ORAL NIGHTLY PRN
Qty: 90 TABLET | Refills: 3 | Status: SHIPPED | OUTPATIENT
Start: 2023-06-14 | End: 2023-10-16 | Stop reason: SDUPTHER

## 2023-06-14 RX ORDER — CYANOCOBALAMIN 1000 UG/ML
1000 INJECTION, SOLUTION INTRAMUSCULAR; SUBCUTANEOUS
Status: SHIPPED | OUTPATIENT
Start: 2023-07-10 | End: 2024-07-04

## 2023-06-14 RX ORDER — ATORVASTATIN CALCIUM 40 MG/1
40 TABLET, FILM COATED ORAL NIGHTLY
Qty: 90 TABLET | Refills: 3 | Status: SHIPPED | OUTPATIENT
Start: 2023-06-14 | End: 2023-08-18 | Stop reason: SDUPTHER

## 2023-06-14 RX ORDER — METFORMIN HYDROCHLORIDE 500 MG/1
500 TABLET ORAL
Qty: 90 TABLET | Refills: 3 | Status: SHIPPED | OUTPATIENT
Start: 2023-06-14 | End: 2023-10-18 | Stop reason: SDUPTHER

## 2023-06-14 RX ORDER — FUROSEMIDE 20 MG/1
TABLET ORAL
Qty: 180 TABLET | Refills: 3 | Status: SHIPPED | OUTPATIENT
Start: 2023-06-14 | End: 2023-10-18 | Stop reason: SDUPTHER

## 2023-06-14 RX ORDER — POTASSIUM CHLORIDE 600 MG/1
8 TABLET, FILM COATED, EXTENDED RELEASE ORAL 2 TIMES DAILY PRN
Qty: 180 TABLET | Refills: 3 | Status: SHIPPED | OUTPATIENT
Start: 2023-06-14 | End: 2023-10-18 | Stop reason: SDUPTHER

## 2023-06-14 RX ORDER — DICLOFENAC SODIUM 10 MG/G
4 GEL TOPICAL 4 TIMES DAILY PRN
Qty: 450 G | Refills: 3 | Status: SHIPPED | OUTPATIENT
Start: 2023-06-14 | End: 2023-10-18 | Stop reason: SDUPTHER

## 2023-06-14 RX ORDER — GABAPENTIN 300 MG/1
300 CAPSULE ORAL NIGHTLY
Qty: 90 CAPSULE | Refills: 3 | Status: SHIPPED | OUTPATIENT
Start: 2023-06-14 | End: 2023-10-16 | Stop reason: SDUPTHER

## 2023-06-14 RX ORDER — LIDOCAINE 50 MG/G
1 PATCH TOPICAL DAILY
Qty: 90 PATCH | Refills: 3 | Status: SHIPPED | OUTPATIENT
Start: 2023-06-14 | End: 2023-10-18 | Stop reason: SDUPTHER

## 2023-06-14 RX ORDER — POTASSIUM CHLORIDE 600 MG/1
8 TABLET, FILM COATED, EXTENDED RELEASE ORAL
COMMUNITY
End: 2023-06-14 | Stop reason: SDUPTHER

## 2023-06-14 RX ORDER — HYDROCODONE BITARTRATE AND ACETAMINOPHEN 7.5; 325 MG/1; MG/1
1 TABLET ORAL
COMMUNITY
End: 2023-06-14

## 2023-06-14 RX ORDER — CYANOCOBALAMIN 1000 UG/ML
1000 INJECTION, SOLUTION INTRAMUSCULAR; SUBCUTANEOUS ONCE
Status: COMPLETED | OUTPATIENT
Start: 2023-06-14 | End: 2023-06-14

## 2023-06-14 RX ADMIN — CYANOCOBALAMIN 1000 MCG: 1000 INJECTION INTRAMUSCULAR; SUBCUTANEOUS at 12:06

## 2023-06-14 NOTE — PROGRESS NOTES
Subjective:       Patient ID: Cinthya Cuevas is a 88 y.o. female.    Chief Complaint: Follow-up, Callouses, Foot Swelling, Bunions, Diabetes Mellitus, and Foot Pain  Patient presents for follow up pain on the outside of tailor's bunion right foot.  Patient reports this area does great for short period of time after trimming.  Recently it has been painful just to apply a thin knee-high sock.  She is wearing wide soft shoes to try to avoid pressure but area is slightly worse compared to last visit, not as severe as initial visit.  She is not quite sure how she developed this no change in shoes or activity.  States she is in her room in her recliner a lot.  Does report a fall since last visit, was evaluated at the hospital.  She has had more problems with swelling in her feet and legs and has an appointment with her PCP following our visit today.  Pain level right foot 6/10.      Past Medical History:   Diagnosis Date    Anemia     Arthritis     Diabetes mellitus     Encounter for blood transfusion     Impaired mobility      Past Surgical History:   Procedure Laterality Date    APPENDECTOMY      BACK SURGERY      3/2015    CHOLECYSTECTOMY      COLONOSCOPY      EYE SURGERY Bilateral     cataract    FRACTURE SURGERY      right ankle     HYSTERECTOMY      JOINT REPLACEMENT      left hip, right knee     History reviewed. No pertinent family history.  Social History     Socioeconomic History    Marital status:    Tobacco Use    Smoking status: Former     Packs/day: 0.25     Years: 31.00     Pack years: 7.75     Types: Cigarettes     Start date: 1957     Quit date: 1988     Years since quittin.4    Smokeless tobacco: Never   Substance and Sexual Activity    Alcohol use: No    Drug use: No       Current Outpatient Medications   Medication Sig Dispense Refill    diclofenac sodium (VOLTAREN) 1 % Gel   APPLY 4 GRAMS TOPICALLY 4 (FOUR) TIMES DAILY., # 400 g, 3 total refill(s), Acute, [facility] [Federal Rx:  #400 last filled 06/22/22]      FREESTYLE INSULINX Misc       FREESTYLE LANCETS 28 gauge lancets       FREESTYLE LITE STRIPS Strp       multivitamin/iron/folic acid (CENTRUM WOMEN ORAL) Take by mouth.      omega-3 fatty acids-fish oil 340-1,000 mg Cap Take 1,000 mg by mouth.      atorvastatin (LIPITOR) 40 MG tablet Take 1 tablet (40 mg total) by mouth every evening. For cholesterol 90 tablet 3    diclofenac sodium (VOLTAREN) 1 % Gel Apply 4 g topically 4 (four) times daily as needed (arthritis pain). 450 g 3    EScitalopram oxalate (LEXAPRO) 10 MG tablet Take 1 tablet (10 mg total) by mouth once daily. For mood 90 tablet 3    furosemide (LASIX) 20 MG tablet Take 1-2 tablets daily if needed for swelling. 180 tablet 3    gabapentin (NEURONTIN) 300 MG capsule Take 1 capsule (300 mg total) by mouth every evening. For diabetic neuropathy 90 capsule 3    LIDOcaine (LIDODERM) 5 % Place 1 patch onto the skin once daily. 90 patch 3    metFORMIN (GLUCOPHAGE) 500 MG tablet Take 1 tablet (500 mg total) by mouth daily with dinner or evening meal. For diabetes 90 tablet 3    potassium chloride (KLOR-CON) 8 MEQ TbSR Take 1 tablet (8 mEq total) by mouth 2 (two) times daily as needed (with Lasix for fluid). 180 tablet 3    SITagliptin phosphate (JANUVIA) 100 MG Tab Take 1 tablet (100 mg total) by mouth once daily. For diabetes 90 tablet 3    traZODone (DESYREL) 50 MG tablet Take 1 tablet (50 mg total) by mouth nightly as needed for Insomnia. 90 tablet 3     Current Facility-Administered Medications   Medication Dose Route Frequency Provider Last Rate Last Admin    [START ON 7/10/2023] cyanocobalamin injection 1,000 mcg  1,000 mcg Intramuscular Q30 Days Dariana HZoila Martin MD         Review of patient's allergies indicates:   Allergen Reactions    Cefaclor      rash    Piroxicam Hives     rash         Review of Systems   Respiratory:  Negative for shortness of breath.    Cardiovascular:  Positive for leg swelling.   Musculoskeletal:   "Positive for gait problem.        Walker   All other systems reviewed and are negative.    Objective:      Vitals:    06/14/23 1011   BP: (!) 142/69   Pulse: 63   Resp: 16   Weight: 58.5 kg (129 lb)   Height: 5' 2" (1.575 m)     Physical Exam  Vitals and nursing note reviewed.   Constitutional:       General: She is not in acute distress.     Appearance: Normal appearance.   Cardiovascular:      Pulses:           Dorsalis pedis pulses are 1+ on the right side and 1+ on the left side.        Posterior tibial pulses are 0 on the right side and 0 on the left side.   Pulmonary:      Effort: Pulmonary effort is normal.   Musculoskeletal:         General: Tenderness present.      Right lower leg: Edema present.      Left lower leg: Edema present.      Right foot: Decreased range of motion (Arthritic and degenerative changes bilateral feet). Bunion (mild HAV and tailor's bunion bilateral) present.      Left foot: Decreased range of motion. Bunion present.      Comments: pain lateral 5th metatarsal head, mild bursitis, prominent due to tailor's bunion deformity. Limited mobility   Feet:      Right foot:      Skin integrity: Callus (Painful, thick, raised IPK with surrounding hyperkeratotic tissue lateral 5th met head right, no discoloration, skin opening, erythema or calor) present.      Left foot:      Skin integrity: No erythema (Stable bruising left hallux nail) or callus.      Toenail Condition: Left toenails are abnormally thick.   Skin:     Capillary Refill: Capillary refill takes 2 to 3 seconds.      Findings: Erythema present.      Comments: Excessive edema left lower extremity with mild erythema on the medial aspect of the lower leg, no skin break or weeping at.  At high   Neurological:      General: No focal deficit present.      Mental Status: She is alert.   Psychiatric:         Mood and Affect: Mood normal.         Behavior: Behavior normal.         Thought Content: Thought content normal.         Judgment: " Judgment normal.                                  Assessment:       1. Terrences bunionette, right    2. Pre-ulcerative calluses    3. Edema of both lower extremities    4. Controlled type 2 diabetes mellitus with other circulatory complication, unspecified whether long term insulin use            Plan:         Reviewed with patient terrences adamion, prominent bone at the base of the 5th digit and with excessive swelling rubbing in this area has caused chronic pre ulcerative callus.  Overall this area is stable since there is no redness, discoloration or skin opening, however she is at risk especially with a history of ulcer inside the 5th digit in this same location.  Advised patient she needs to discuss with her PCP today treatment for the swelling in her legs, swelling in her feet is contributing to pressure in this area  Patient inquires if she needs surgery, advised if she receives a lot of relief through debridement of this area would not recommend surgery.  Did however recommend 2nd opinion and encouraged patient to make a follow-up with Dr. Theo Urias to evaluate the area  Debridement of pre ulcerative callus lateral 5th met head right foot gave patient a lot of relief.  Reassured patient there is no skin opening or sign  Of infection applied to 2 x 2 gauze folded between the 4th and 5th digits.  Advised patient placing the 5th digit in a straighter position may take pressure off the metatarsal head/callus.  Patient understands we do not want to apply any type of bandage or dressing over the area of painful callus.  We want this area to stay clean and dry  Following debridement and placement of gauze 4th webspace patient noted little to no pain upon weight-bearing out of the treatment room using her walker.  Reviewed diabetic education and daily foot checks, contact office immediately with any changes or concerns  Patient was in understanding and agreement with treatment plan.  I counseled the patient on  their conditions, implications and medical management.  Instructed patient to contact the office with any changes, questions, concerns, worsening of symptoms.   Total face to face time 20 minutes, exam, assessment, treatment, discussion, additional time for review of chart prior to and following appointment and visit documentation, consultation and coordination of care.   Follow up 1 month, Dr. Theo Urias    This note was created using [employer] voice recognition software that occasionally misinterpreted phrases or words.

## 2023-06-14 NOTE — Clinical Note
Fax motorized scooter to DME. Schedule labs and follow up late September or early October. Fax derm referral and note to Dr. Terrazas's office. Schedule monthly B12 injection. Order in Epic. Try to align with any other OV (podiatry or with me etc.)

## 2023-06-14 NOTE — TELEPHONE ENCOUNTER
----- Message from Ella Urias DPM sent at 6/14/2023  3:51 PM CDT -----  Please call patient with an appointment to see Dr. Theo Urias in the [facility] for evaluation of tailor's bunion right foot.  Thank you

## 2023-06-21 ENCOUNTER — OFFICE VISIT (OUTPATIENT)
Dept: PODIATRY | Facility: CLINIC | Age: 88
End: 2023-06-21
Payer: MEDICARE

## 2023-06-21 VITALS
HEIGHT: 62 IN | HEART RATE: 76 BPM | SYSTOLIC BLOOD PRESSURE: 143 MMHG | DIASTOLIC BLOOD PRESSURE: 77 MMHG | BODY MASS INDEX: 23.74 KG/M2 | WEIGHT: 129 LBS

## 2023-06-21 DIAGNOSIS — M21.621 TAILOR'S BUNION OF RIGHT FOOT: Primary | ICD-10-CM

## 2023-06-21 DIAGNOSIS — E11.9 COMPREHENSIVE DIABETIC FOOT EXAMINATION, TYPE 2 DM, ENCOUNTER FOR: ICD-10-CM

## 2023-06-21 DIAGNOSIS — E11.49 TYPE II DIABETES MELLITUS WITH NEUROLOGICAL MANIFESTATIONS: ICD-10-CM

## 2023-06-21 PROCEDURE — 99999 PR PBB SHADOW E&M-EST. PATIENT-LVL IV: ICD-10-PCS | Mod: PBBFAC,,, | Performed by: PODIATRIST

## 2023-06-21 PROCEDURE — 99213 OFFICE O/P EST LOW 20 MIN: CPT | Mod: S$PBB,,, | Performed by: PODIATRIST

## 2023-06-21 PROCEDURE — 99214 OFFICE O/P EST MOD 30 MIN: CPT | Mod: PBBFAC | Performed by: PODIATRIST

## 2023-06-21 PROCEDURE — 99999 PR PBB SHADOW E&M-EST. PATIENT-LVL IV: CPT | Mod: PBBFAC,,, | Performed by: PODIATRIST

## 2023-06-21 PROCEDURE — 99213 PR OFFICE/OUTPT VISIT, EST, LEVL III, 20-29 MIN: ICD-10-PCS | Mod: S$PBB,,, | Performed by: PODIATRIST

## 2023-06-24 PROBLEM — M21.621 TAILOR'S BUNION OF RIGHT FOOT: Status: ACTIVE | Noted: 2023-06-24

## 2023-06-24 NOTE — PROGRESS NOTES
Subjective:       Patient ID: Cinthya Cuevas is a 88 y.o. female.    Chief Complaint: Callouses, Foot Pain, and Leg Swelling  Patient presents for follow up pain on the outside of tailor's bunion right foot.  Patient reports this area does great for short period of time after trimming.  Recently it has been painful just to apply a thin knee-high sock.  She is wearing wide soft shoes to try to avoid pressure but area is slightly worse compared to last visit, not as severe as initial visit.  She is not quite sure how she developed this no change in shoes or activity.  States she is in her room in her recliner a lot.        Past Medical History:   Diagnosis Date    Anemia     Arthritis     Diabetes mellitus     Encounter for blood transfusion     Impaired mobility      Past Surgical History:   Procedure Laterality Date    APPENDECTOMY      BACK SURGERY      3/2015    CHOLECYSTECTOMY      COLONOSCOPY      EYE SURGERY Bilateral     cataract    FRACTURE SURGERY      right ankle     HYSTERECTOMY      JOINT REPLACEMENT      left hip, right knee     History reviewed. No pertinent family history.  Social History     Socioeconomic History    Marital status:    Tobacco Use    Smoking status: Former     Packs/day: 0.25     Years: 31.00     Pack years: 7.75     Types: Cigarettes     Start date: 1957     Quit date: 1988     Years since quittin.5    Smokeless tobacco: Never   Substance and Sexual Activity    Alcohol use: No    Drug use: No       Current Outpatient Medications   Medication Sig Dispense Refill    atorvastatin (LIPITOR) 40 MG tablet Take 1 tablet (40 mg total) by mouth every evening. For cholesterol 90 tablet 3    diclofenac sodium (VOLTAREN) 1 % Gel Apply 4 g topically 4 (four) times daily as needed (arthritis pain). 450 g 3    EScitalopram oxalate (LEXAPRO) 10 MG tablet Take 1 tablet (10 mg total) by mouth once daily. For mood 90 tablet 3    FREESTYLE INSULINX Misc       FREESTYLE  "LANCETS 28 gauge lancets       FREESTYLE LITE STRIPS Strp       furosemide (LASIX) 20 MG tablet Take 1-2 tablets daily if needed for swelling. 180 tablet 3    gabapentin (NEURONTIN) 300 MG capsule Take 1 capsule (300 mg total) by mouth every evening. For diabetic neuropathy 90 capsule 3    LIDOcaine (LIDODERM) 5 % Place 1 patch onto the skin once daily. 90 patch 3    metFORMIN (GLUCOPHAGE) 500 MG tablet Take 1 tablet (500 mg total) by mouth daily with dinner or evening meal. For diabetes 90 tablet 3    multivitamin/iron/folic acid (CENTRUM WOMEN ORAL) Take by mouth.      omega-3 fatty acids-fish oil 340-1,000 mg Cap Take 1,000 mg by mouth.      potassium chloride (KLOR-CON) 8 MEQ TbSR Take 1 tablet (8 mEq total) by mouth 2 (two) times daily as needed (with Lasix for fluid). 180 tablet 3    SITagliptin phosphate (JANUVIA) 100 MG Tab Take 1 tablet (100 mg total) by mouth once daily. For diabetes 90 tablet 3    traZODone (DESYREL) 50 MG tablet Take 1 tablet (50 mg total) by mouth nightly as needed for Insomnia. 90 tablet 3     Current Facility-Administered Medications   Medication Dose Route Frequency Provider Last Rate Last Admin    [START ON 7/10/2023] cyanocobalamin injection 1,000 mcg  1,000 mcg Intramuscular Q30 Days Dariana H. MD Mario         Review of patient's allergies indicates:  No Known Allergies        Review of Systems   Respiratory:  Negative for shortness of breath.    Cardiovascular:  Positive for leg swelling.   Musculoskeletal:  Positive for gait problem.        Walker   All other systems reviewed and are negative.    Objective:      Vitals:    06/21/23 1313   BP: (!) 143/77   BP Location: Right arm   Patient Position: Sitting   Pulse: 76   Weight: 58.5 kg (129 lb)   Height: 5' 2" (1.575 m)     Physical Exam  Vitals and nursing note reviewed.   Constitutional:       General: She is not in acute distress.     Appearance: Normal appearance.   Cardiovascular:      Pulses:           Dorsalis pedis " pulses are 1+ on the right side and 1+ on the left side.        Posterior tibial pulses are 0 on the right side and 0 on the left side.   Pulmonary:      Effort: Pulmonary effort is normal.   Musculoskeletal:         General: Tenderness present.      Right lower leg: Edema present.      Left lower leg: Edema present.      Right foot: Decreased range of motion (Arthritic and degenerative changes bilateral feet). Bunion (mild HAV and tailor's bunion bilateral) present.      Left foot: Decreased range of motion. Bunion present.      Comments: pain lateral 5th metatarsal head, mild bursitis, prominent due to tailor's bunion deformity. Limited mobility   Feet:      Right foot:      Skin integrity: Callus (Painful, thick, raised IPK with surrounding hyperkeratotic tissue lateral 5th met head right, no discoloration, skin opening, erythema or calor) present.      Left foot:      Skin integrity: No erythema (Stable bruising left hallux nail) or callus.      Toenail Condition: Left toenails are abnormally thick.   Skin:     Capillary Refill: Capillary refill takes 2 to 3 seconds.      Findings: Erythema present.      Comments: Excessive edema left lower extremity with mild erythema on the medial aspect of the lower leg, no skin break or weeping at.  At high   Neurological:      General: No focal deficit present.      Mental Status: She is alert.   Psychiatric:         Mood and Affect: Mood normal.         Behavior: Behavior normal.         Thought Content: Thought content normal.         Judgment: Judgment normal.                                                    Assessment:       1. Tailor's bunion of right foot    2. Type II diabetes mellitus with neurological manifestations    3. Comprehensive diabetic foot examination, type 2 DM, encounter for            Plan:         Patient seen for diabetic evaluation today she is also complaining about tailor's bunion deformity on her right foot that has been causing her a lot of  pain and discomfort.  Patient does have a pre ulcerative hyperkeratotic lesion overlying this area I was able to aggressively trim and debride this which will give the patient relief however I am concerned that the area made build back up she recently got some flip-flops several days ago which she thinks has helped take pressure off the area certainly she needs to be careful because she has an issue with falling and wearing a flip-flop could lead to additional falls.  I did aggressively trim and debride the area I have padded the area with a light dressing I have recommended that the patient start to apply a Lidoderm patch to the area she has this for her shoulder she can cut up the Lidoderm patch placing it over the area which should help to diminish the discomfort she is having this in addition to the open toed shoes should make a considerable difference after debriding the area patient noted considerable relief from her discomfort.  Surgical intervention is not recommended.  I recommend pursuing conservative treatment only.  This note was created using [x+1] voice recognition software that occasionally misinterpreted phrases or words.

## 2023-06-26 ENCOUNTER — DOCUMENT SCAN (OUTPATIENT)
Dept: HOME HEALTH SERVICES | Facility: HOSPITAL | Age: 88
End: 2023-06-26
Payer: MEDICARE

## 2023-06-27 ENCOUNTER — TELEPHONE (OUTPATIENT)
Dept: FAMILY MEDICINE | Facility: CLINIC | Age: 88
End: 2023-06-27

## 2023-06-27 NOTE — TELEPHONE ENCOUNTER
----- Message from Lashell Holden sent at 6/27/2023  8:57 AM CDT -----  Contact: self  Type: Needs Medical Advice  Who Called: [facility]   Best Call Back Number: Carli (Physical Therapist ) [phone]  Additional Information: Office just wanted Dr. Otto to know pt was discharged yesterday afternoon. Pt had max therapy potential.

## 2023-06-27 NOTE — TELEPHONE ENCOUNTER
Patient requested motorized scooter to aid mobility, want to follow up on this. Noted that she has been d/c from PT.

## 2023-06-30 ENCOUNTER — DOCUMENT SCAN (OUTPATIENT)
Dept: HOME HEALTH SERVICES | Facility: HOSPITAL | Age: 88
End: 2023-06-30
Payer: MEDICARE

## 2023-07-07 ENCOUNTER — TELEPHONE (OUTPATIENT)
Dept: FAMILY MEDICINE | Facility: CLINIC | Age: 88
End: 2023-07-07
Payer: MEDICARE

## 2023-07-07 NOTE — TELEPHONE ENCOUNTER
----- Message from Darell Rivera sent at 7/7/2023  4:05 PM CDT -----  Type:  Patient Returning Call    Who Called:  Pt  Who Left Message for Patient:  Alisson  Does the patient know what this is regarding?:  Yes  Best Call Back Number:  [phone]  Additional Information:  Pt states she is out of town and has bad service, pl call bk twice to advise thanks

## 2023-07-07 NOTE — TELEPHONE ENCOUNTER
----- Message from Dariana Martin MD sent at 6/14/2023  1:14 PM CDT -----  Fax motorized scooter to DME.  Schedule labs and follow up late September or early October.  Fax derm referral and note to Dr. Terrazas's office.  Schedule monthly B12 injection. Order in Epic. Try to align with any other OV (podiatry or with me etc.)

## 2023-07-10 ENCOUNTER — TELEPHONE (OUTPATIENT)
Dept: FAMILY MEDICINE | Facility: CLINIC | Age: 88
End: 2023-07-10
Payer: MEDICARE

## 2023-07-10 NOTE — TELEPHONE ENCOUNTER
----- Message from Liana Johnston sent at 7/10/2023 10:40 AM CDT -----  Contact: Patient  Pt wants to tell you she will be back on town on 7/23, thanks!

## 2023-07-10 NOTE — TELEPHONE ENCOUNTER
Call placed to patient due to message left, currently not available message left.    ----- Message from Liana Johnston sent at 7/10/2023  7:13 AM CDT -----  Contact: Patient  Type: Needs Medical Advice    Who Called:  Patient  What is this regarding?:  Pt is in the process of obtaining a scooter and she would like on update on it and wants to proceed with it.   Best Call Back Number:  [phone]  Additional Information:  Please call the patient back at the phone number listed above to advise. Thank you!

## 2023-07-24 ENCOUNTER — TELEPHONE (OUTPATIENT)
Dept: FAMILY MEDICINE | Facility: CLINIC | Age: 88
End: 2023-07-24
Payer: MEDICARE

## 2023-07-24 NOTE — TELEPHONE ENCOUNTER
Call placed to patient due to return call due message left, patient states she has had an HA for over 1 week which onset after failling x 3 and striking head. Informed patient she needs to go ER states will wait for appt. Apt 7/25/2023 w/ @ 0900    ----- Message from Alisson Stover MA sent at 7/24/2023  4:17 PM CDT -----    ----- Message -----  From: Calli Campo  Sent: 7/24/2023   4:09 PM CDT  To: [facility] Staff    Type:  Patient Returning Call    Who Called:  pt   Who Left Message for Patient:  rafa   Does the patient know what this is regarding?:  yes   Best Call Back Number:  [phone]    Additional Information:  please advise

## 2023-07-24 NOTE — TELEPHONE ENCOUNTER
Call placed to patient due to message left, currently not available message left for return call.    ----- Message from Erin La sent at 7/24/2023  3:03 PM CDT -----  Contact: self  Type:  Same Day Appointment Request    Caller is requesting a same day appointment.  Caller declined first available appointment listed below.      Name of Caller:  pt  When is the first available appointment?  8/16  Symptoms:  headaches from fall 3 times in last month  Best Call Back Number: [phone]    Additional Information:   Thank you

## 2023-07-25 ENCOUNTER — OFFICE VISIT (OUTPATIENT)
Dept: FAMILY MEDICINE | Facility: CLINIC | Age: 88
End: 2023-07-25
Payer: MEDICARE

## 2023-07-25 VITALS
DIASTOLIC BLOOD PRESSURE: 68 MMHG | OXYGEN SATURATION: 92 % | SYSTOLIC BLOOD PRESSURE: 100 MMHG | HEART RATE: 67 BPM | BODY MASS INDEX: 24.75 KG/M2 | WEIGHT: 134.5 LBS | HEIGHT: 62 IN
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
## 2023-07-25 DIAGNOSIS — S09.90XA TRAUMATIC INJURY OF HEAD, INITIAL ENCOUNTER: ICD-10-CM

## 2023-07-25 DIAGNOSIS — R51.9 NEW ONSET OF HEADACHES AFTER AGE 50: Primary | ICD-10-CM

## 2023-07-25 DIAGNOSIS — R60.0 PERIPHERAL EDEMA: ICD-10-CM

## 2023-07-25 DIAGNOSIS — R26.81 UNSTABLE GAIT: ICD-10-CM

## 2023-07-25 PROBLEM — M46.1 SACROILIITIS: Status: RESOLVED | Noted: 2023-05-08 | Resolved: 2023-07-25

## 2023-07-25 PROCEDURE — 99999 PR PBB SHADOW E&M-EST. PATIENT-LVL V: ICD-10-PCS | Mod: PBBFAC,,, | Performed by: FAMILY MEDICINE

## 2023-07-25 PROCEDURE — 99214 OFFICE O/P EST MOD 30 MIN: CPT | Mod: S$PBB,,, | Performed by: FAMILY MEDICINE

## 2023-07-25 PROCEDURE — 99999 PR PBB SHADOW E&M-EST. PATIENT-LVL V: CPT | Mod: PBBFAC,,, | Performed by: FAMILY MEDICINE

## 2023-07-25 PROCEDURE — 99215 OFFICE O/P EST HI 40 MIN: CPT | Mod: PBBFAC,PN | Performed by: FAMILY MEDICINE

## 2023-07-25 PROCEDURE — 99214 PR OFFICE/OUTPT VISIT, EST, LEVL IV, 30-39 MIN: ICD-10-PCS | Mod: S$PBB,,, | Performed by: FAMILY MEDICINE

## 2023-07-25 NOTE — PROGRESS NOTES
Subjective     Patient ID: Cinthya Cuevas is a 88 y.o. female.    Chief Complaint: Fall (Patient stated that she fell last week and hit the crown of her head) and Headache    Urgent care visit:     Pt states she's fallen 3 times over the past 3 months. Most recent was last week she fell on concrete and hit the back of her head. No LOC. States has an appointment Thursday with her Ophthalmologist because she's been seeing double at times since her first fall. States she went to [facility] last month for that fall and CT was negative. States she's had a bad headache since this last fall. She lives in assisted living and has a medical alert button that she pushes when she falls. States she just finished [facility] for her shoulder. Pt states her pcp is trying to get her a scooter. Pt lives at [facility] in [place].     Peripheral edema: states just started lasix her pcp prescribed.    Review of Systems   Constitutional:  Negative for activity change, appetite change, chills, diaphoresis, fatigue, fever and unexpected weight change.   Respiratory:  Negative for cough, choking and chest tightness.    Cardiovascular:  Negative for chest pain, palpitations, leg swelling and claudication.   Gastrointestinal:  Negative for abdominal pain, change in bowel habit, constipation, diarrhea, nausea, vomiting and change in bowel habit.   Neurological:  Positive for weakness, headaches, coordination difficulties and coordination difficulties.   Psychiatric/Behavioral:  Negative for dysphoric mood, self-injury, sleep disturbance and suicidal ideas. The patient is not nervous/anxious.         Objective     Physical Exam  Vitals reviewed.   Constitutional:       General: She is not in acute distress.     Appearance: Normal appearance. She is normal weight. She is not ill-appearing or toxic-appearing.   Cardiovascular:      Rate and Rhythm: Normal rate and regular rhythm.      Heart sounds: Murmur heard.   Pulmonary:       Effort: Pulmonary effort is normal. No respiratory distress.      Breath sounds: Normal breath sounds.   Musculoskeletal:      Right lower leg: Edema (1+) present.      Left lower leg: Edema (2+) present.      Comments: Using wheeled walker   Neurological:      General: No focal deficit present.      Mental Status: She is alert and oriented to person, place, and time.   Psychiatric:         Mood and Affect: Mood normal.         Behavior: Behavior normal.        Assessment and Plan     1. New onset of headaches after age 50  -     Cancel: CT Head Without Contrast; Future; Expected date: 07/25/2023  -     CT Head Without Contrast; Future; Expected date: 07/25/2023    2. Fall, initial encounter  -     Cancel: CT Head Without Contrast; Future; Expected date: 07/25/2023  -     CT Head Without Contrast; Future; Expected date: 07/25/2023    3. Traumatic injury of head, initial encounter  -     Cancel: CT Head Without Contrast; Future; Expected date: 07/25/2023  -     CT Head Without Contrast; Future; Expected date: 07/25/2023    4. Peripheral edema    5. Unstable gait      ER records reviewed  Will order CT head to be done at [facility]. Will print out order for pt to have her senior living facility assist with transportation  Will refer back to PT and pt was discharged the end of June  Will schedule f/u appointment with her pcp to discuss scoter.  All questions were answered to the fullest satisfaction of the patient, and pt verbalized understanding and agreement to treatment plan. Pt was to call with any new or worsening symptoms, or present to the ER.         Libertad Reece MD  Family Medicine Physician   [facility]     Total time spent 30 mins     This note was created using [email or web address] voice recognition software that occasionally may misinterpret phrases or words.

## 2023-07-31 PROCEDURE — G0180 PR HOME HEALTH MD CERTIFICATION: ICD-10-PCS | Mod: ,,, | Performed by: FAMILY MEDICINE

## 2023-07-31 PROCEDURE — G0180 MD CERTIFICATION HHA PATIENT: HCPCS | Mod: ,,, | Performed by: FAMILY MEDICINE

## 2023-08-02 ENCOUNTER — TELEPHONE (OUTPATIENT)
Dept: FAMILY MEDICINE | Facility: CLINIC | Age: 88
End: 2023-08-02
Payer: MEDICARE

## 2023-08-02 ENCOUNTER — TELEPHONE (OUTPATIENT)
Dept: FAMILY MEDICINE | Facility: CLINIC | Age: 88
End: 2023-08-02

## 2023-08-02 NOTE — TELEPHONE ENCOUNTER
Ilene Reece,  Please review message below from patient.  Call placed to patient due to message left, spoke with patient requesting the results on CT Scan/head. Reviewed chart no results. Call [facility] radiology to send report to  clinic.  Thank you.  Signed:  Laly Willson LPN    ----- Message from Cirilo Javierne sent at 8/2/2023 10:18 AM CDT -----  Type: Needs Medical Advice  Who Called: pt  Best Call Back Number: [phone]  Additional Information: pt is calling the office to speak with the nurse in regards to her CT scan. The pt has a question about the results. Please call back to advise. Thanks!

## 2023-08-09 LAB
LEFT EYE DM RETINOPATHY: NEGATIVE
RIGHT EYE DM RETINOPATHY: NEGATIVE

## 2023-08-11 ENCOUNTER — TELEPHONE (OUTPATIENT)
Dept: FAMILY MEDICINE | Facility: CLINIC | Age: 88
End: 2023-08-11
Payer: MEDICARE

## 2023-08-11 DIAGNOSIS — S06.0X0S: Primary | ICD-10-CM

## 2023-08-11 PROBLEM — S06.0XAA CLOSED HEAD INJURY WITH CONCUSSION: Status: ACTIVE | Noted: 2023-08-11

## 2023-08-11 NOTE — TELEPHONE ENCOUNTER
----- Message from Krys Bahena sent at 8/10/2023 11:14 AM CDT -----  Contact: nick  Type: Needs Medical Advice  Who Called:  [facility]  Symptoms (please be specific):  n/a  How long has patient had these symptoms:  n/a  Pharmacy name and phone #:  n/a  Best Call Back Number: [phone]  Additional Information: need to clarify head injury diagnoses for home health.please call

## 2023-08-14 ENCOUNTER — OFFICE VISIT (OUTPATIENT)
Dept: PODIATRY | Facility: CLINIC | Age: 88
End: 2023-08-14
Payer: MEDICARE

## 2023-08-14 VITALS
BODY MASS INDEX: 23.37 KG/M2 | HEART RATE: 69 BPM | SYSTOLIC BLOOD PRESSURE: 103 MMHG | RESPIRATION RATE: 16 BRPM | HEIGHT: 62 IN | DIASTOLIC BLOOD PRESSURE: 66 MMHG | WEIGHT: 127 LBS

## 2023-08-14 DIAGNOSIS — M21.621 TAILOR'S BUNION OF RIGHT FOOT: Primary | ICD-10-CM

## 2023-08-14 DIAGNOSIS — R60.0 EDEMA OF BOTH LOWER EXTREMITIES: ICD-10-CM

## 2023-08-14 DIAGNOSIS — M20.61 ACQUIRED DEFORMITY OF TOE, RIGHT: ICD-10-CM

## 2023-08-14 DIAGNOSIS — E11.59 CONTROLLED TYPE 2 DIABETES MELLITUS WITH OTHER CIRCULATORY COMPLICATION, UNSPECIFIED WHETHER LONG TERM INSULIN USE: ICD-10-CM

## 2023-08-14 DIAGNOSIS — L84 PRE-ULCERATIVE CALLUSES: ICD-10-CM

## 2023-08-14 PROCEDURE — 99213 OFFICE O/P EST LOW 20 MIN: CPT | Mod: S$PBB,,, | Performed by: PODIATRIST

## 2023-08-14 PROCEDURE — 99213 PR OFFICE/OUTPT VISIT, EST, LEVL III, 20-29 MIN: ICD-10-PCS | Mod: S$PBB,,, | Performed by: PODIATRIST

## 2023-08-14 PROCEDURE — 99999 PR PBB SHADOW E&M-EST. PATIENT-LVL V: CPT | Mod: PBBFAC,,, | Performed by: PODIATRIST

## 2023-08-14 PROCEDURE — 99999 PR PBB SHADOW E&M-EST. PATIENT-LVL V: ICD-10-PCS | Mod: PBBFAC,,, | Performed by: PODIATRIST

## 2023-08-14 PROCEDURE — 99215 OFFICE O/P EST HI 40 MIN: CPT | Mod: PBBFAC,PN | Performed by: PODIATRIST

## 2023-08-15 NOTE — PROGRESS NOTES
Subjective:       Patient ID: Cinthya Cuevas is a 88 y.o. female.    Chief Complaint: Follow-up, Bunions, Diabetes Mellitus, and Foot Pain  Patient presents for follow up pain preulcerative callus lateral tailor's bunion right foot.  Patient reports this area has improved slightly, wearing open toed shoes which help. Does elevate legs in her room in her recliner a lot.  Does report multiple falls since last visit, bruised right knee today  Reports diabetes has been doing very well with glucose one hundred twenty-two this morning  Pain level right foot 2/10.      Past Medical History:   Diagnosis Date    Anemia     Arthritis     Diabetes mellitus     Encounter for blood transfusion     Impaired mobility      Past Surgical History:   Procedure Laterality Date    APPENDECTOMY      BACK SURGERY      3/2015    CHOLECYSTECTOMY      COLONOSCOPY      EYE SURGERY Bilateral     cataract    FRACTURE SURGERY      right ankle     HYSTERECTOMY      JOINT REPLACEMENT      left hip, right knee     History reviewed. No pertinent family history.  Social History     Socioeconomic History    Marital status:    Tobacco Use    Smoking status: Former     Current packs/day: 0.00     Average packs/day: 0.3 packs/day for 31.0 years (7.8 ttl pk-yrs)     Types: Cigarettes     Start date: 1957     Quit date: 1988     Years since quittin.6    Smokeless tobacco: Never   Substance and Sexual Activity    Alcohol use: No    Drug use: No       Current Outpatient Medications   Medication Sig Dispense Refill    atorvastatin (LIPITOR) 40 MG tablet Take 1 tablet (40 mg total) by mouth every evening. For cholesterol 90 tablet 3    diclofenac sodium (VOLTAREN) 1 % Gel Apply 4 g topically 4 (four) times daily as needed (arthritis pain). 450 g 3    EScitalopram oxalate (LEXAPRO) 10 MG tablet Take 1 tablet (10 mg total) by mouth once daily. For mood 90 tablet 3    FREESTYLE INSULINX Misc       FREESTYLE LANCETS 28 gauge lancets    "    FREESTYLE LITE STRIPS Strp       furosemide (LASIX) 20 MG tablet Take 1-2 tablets daily if needed for swelling. 180 tablet 3    gabapentin (NEURONTIN) 300 MG capsule Take 1 capsule (300 mg total) by mouth every evening. For diabetic neuropathy 90 capsule 3    LIDOcaine (LIDODERM) 5 % Place 1 patch onto the skin once daily. 90 patch 3    metFORMIN (GLUCOPHAGE) 500 MG tablet Take 1 tablet (500 mg total) by mouth daily with dinner or evening meal. For diabetes 90 tablet 3    multivitamin/iron/folic acid (CENTRUM WOMEN ORAL) Take by mouth.      omega-3 fatty acids-fish oil 340-1,000 mg Cap Take 1,000 mg by mouth.      potassium chloride (KLOR-CON) 8 MEQ TbSR Take 1 tablet (8 mEq total) by mouth 2 (two) times daily as needed (with Lasix for fluid). 180 tablet 3    SITagliptin phosphate (JANUVIA) 100 MG Tab Take 1 tablet (100 mg total) by mouth once daily. For diabetes 90 tablet 3    traZODone (DESYREL) 50 MG tablet Take 1 tablet (50 mg total) by mouth nightly as needed for Insomnia. 90 tablet 3     Current Facility-Administered Medications   Medication Dose Route Frequency Provider Last Rate Last Admin    cyanocobalamin injection 1,000 mcg  1,000 mcg Intramuscular Q30 Days Dariana Martin MD         Review of patient's allergies indicates:  No Known Allergies        Review of Systems   Respiratory:  Negative for shortness of breath.    Cardiovascular:  Positive for leg swelling.   Musculoskeletal:  Positive for gait problem.        Walker   All other systems reviewed and are negative.      Objective:      Vitals:    08/14/23 1047   BP: 103/66   Pulse: 69   Resp: 16   Weight: 57.6 kg (127 lb)   Height: 5' 2" (1.575 m)     Physical Exam  Vitals and nursing note reviewed.   Constitutional:       General: She is not in acute distress.     Appearance: Normal appearance.   Cardiovascular:      Pulses:           Dorsalis pedis pulses are 1+ on the right side and 1+ on the left side.        Posterior tibial pulses are 0 " on the right side and 0 on the left side.   Pulmonary:      Effort: Pulmonary effort is normal.   Musculoskeletal:         General: Tenderness present.      Right lower leg: Edema present.      Left lower leg: Edema present.      Right foot: Decreased range of motion (Arthritic and degenerative changes bilateral feet). Bunion (mild HAV and tailor's bunion bilateral) present.      Left foot: Decreased range of motion. Bunion present.      Comments: Decreased pain lateral 5th metatarsal head, mild bursitis, prominent due to tailor's bunion deformity. Limited mobility   Feet:      Right foot:      Skin integrity: Callus (Tender preulcerative callus/ hyperkeratotic tissue lateral 5th met head right, no discoloration, no skin opening, no erythema) present.      Left foot:      Skin integrity: No callus.   Skin:     Capillary Refill: Capillary refill takes 2 to 3 seconds.      Findings: Erythema present.      Comments: Excessive edema left lower extremity with mild erythema on the medial aspect of the lower leg, no skin break or weeping at.  At high   Neurological:      General: No focal deficit present.      Mental Status: She is alert.   Psychiatric:         Behavior: Behavior normal.         Thought Content: Thought content normal.                              Assessment:       1. Tailor's bunion of right foot    2. Pre-ulcerative calluses    3. Acquired deformity of toe, right    4. Controlled type 2 diabetes mellitus with other circulatory complication, unspecified whether long term insulin use    5. Edema of both lower extremities            Plan:         Reviewed with patient pressure with tailor's bunion, prominent bone pre ulcerative callus slightly improved  Overall this area is stable no discoloration or skin opening, however remains at risk, history of ulcer inside this 5th digit  Advised patient she needs to discuss with her PCP today treatment for the swelling in her legs, swelling in her feet is  contributing to pressure in this area  Debridement of pre ulcerative callus lateral 5th met head right foot, advised patient of a spot of bleeding, cleaned with alcohol, antibiotic, gauze, fabric bandage applied  Remove in the morning and keep clean and dry, do not cover  Reviewed potential complications  Reviewed diabetic education and daily foot checks, contact office immediately with any changes or concerns  Patient was in understanding and agreement with treatment plan.  I counseled the patient on their conditions, implications and medical management.  Instructed patient to contact the office with any changes, questions, concerns, worsening of symptoms.   Total face to face time 20 minutes, exam, assessment, treatment, discussion, additional time for review of chart prior to and following appointment and visit documentation, consultation and coordination of care.   Follow up 2 months    This note was created using [employer] voice recognition software that occasionally misinterpreted phrases or words.

## 2023-08-17 ENCOUNTER — OFFICE VISIT (OUTPATIENT)
Dept: FAMILY MEDICINE | Facility: CLINIC | Age: 88
End: 2023-08-17
Payer: MEDICARE

## 2023-08-17 VITALS
HEIGHT: 62 IN | OXYGEN SATURATION: 100 % | HEART RATE: 71 BPM | BODY MASS INDEX: 24.04 KG/M2 | DIASTOLIC BLOOD PRESSURE: 64 MMHG | SYSTOLIC BLOOD PRESSURE: 118 MMHG | WEIGHT: 130.63 LBS

## 2023-08-17 DIAGNOSIS — E53.8 LOW SERUM VITAMIN B12: ICD-10-CM

## 2023-08-17 DIAGNOSIS — M75.40 IMPINGEMENT SYNDROME OF SHOULDER REGION, UNSPECIFIED LATERALITY: ICD-10-CM

## 2023-08-17 DIAGNOSIS — M62.81 MUSCLE WEAKNESS OF ALL 4 EXTREMITIES: ICD-10-CM

## 2023-08-17 DIAGNOSIS — R29.6 RECURRENT FALLS: ICD-10-CM

## 2023-08-17 DIAGNOSIS — E11.42 DIABETIC PERIPHERAL NEUROPATHY ASSOCIATED WITH TYPE 2 DIABETES MELLITUS: ICD-10-CM

## 2023-08-17 DIAGNOSIS — E11.49 TYPE II DIABETES MELLITUS WITH NEUROLOGICAL MANIFESTATIONS: Primary | ICD-10-CM

## 2023-08-17 DIAGNOSIS — N18.30 STAGE 3 CHRONIC KIDNEY DISEASE, UNSPECIFIED WHETHER STAGE 3A OR 3B CKD: ICD-10-CM

## 2023-08-17 PROCEDURE — G0372 PR WELCOME MEDICARE ESTABLISHED VISIT FOR POWER MOBILITY DEVICE: ICD-10-PCS | Mod: S$PBB,,, | Performed by: FAMILY MEDICINE

## 2023-08-17 PROCEDURE — 99999PBSHW PR PBB SHADOW TECHNICAL ONLY FILED TO HB: ICD-10-PCS | Mod: PBBFAC,,,

## 2023-08-17 PROCEDURE — 99999 PR PBB SHADOW E&M-EST. PATIENT-LVL IV: CPT | Mod: PBBFAC,,, | Performed by: FAMILY MEDICINE

## 2023-08-17 PROCEDURE — 99215 PR OFFICE/OUTPT VISIT, EST, LEVL V, 40-54 MIN: ICD-10-PCS | Mod: S$PBB,,, | Performed by: FAMILY MEDICINE

## 2023-08-17 PROCEDURE — 99999PBSHW PR PBB SHADOW TECHNICAL ONLY FILED TO HB: Mod: PBBFAC,,,

## 2023-08-17 PROCEDURE — G0372 MD SERVICE REQUIRED FOR PMD: HCPCS | Mod: S$PBB,,, | Performed by: FAMILY MEDICINE

## 2023-08-17 PROCEDURE — 96372 THER/PROPH/DIAG INJ SC/IM: CPT | Mod: PBBFAC,PN

## 2023-08-17 PROCEDURE — 99215 OFFICE O/P EST HI 40 MIN: CPT | Mod: S$PBB,,, | Performed by: FAMILY MEDICINE

## 2023-08-17 PROCEDURE — 99999 PR PBB SHADOW E&M-EST. PATIENT-LVL IV: ICD-10-PCS | Mod: PBBFAC,,, | Performed by: FAMILY MEDICINE

## 2023-08-17 PROCEDURE — 99214 OFFICE O/P EST MOD 30 MIN: CPT | Mod: 25 | Performed by: FAMILY MEDICINE

## 2023-08-17 RX ORDER — CYANOCOBALAMIN 1000 UG/ML
1000 INJECTION, SOLUTION INTRAMUSCULAR; SUBCUTANEOUS ONCE
Status: SHIPPED | OUTPATIENT
Start: 2023-08-17

## 2023-08-17 RX ADMIN — CYANOCOBALAMIN 1000 MCG: 1000 INJECTION, SOLUTION INTRAMUSCULAR at 02:08

## 2023-08-17 NOTE — PROGRESS NOTES
Subjective:       Patient ID: Cinthya Cuevas is a 88 y.o. female.    Chief Complaint: Follow-up    87yo female here today for f/u DM, address recurrent falls. She initially requested motorized scooter Rx, but states she does not want to do that right now. She has been to the ER after falling backwards, suffered scalp bruise. She also has fallen forward--states it happens when she is getting from the bed to the chair or when leaning forward to  something from the floor. She has a quad cane, a rolling walker with seat and a rolling walker without seat.    She is meeting with PT and OT at her alf home. States she is hoping to strengthen her core and work on balance.          11/9/2022    10:27 AM 8/17/2021     9:11 AM   Depression Patient Health Questionnaire   Over the last two weeks how often have you been bothered by little interest or pleasure in doing things Not at all Not at all   Over the last two weeks how often have you been bothered by feeling down, depressed or hopeless Not at all Not at all   PHQ-2 Total Score 0 0     Review of Systems   All other systems reviewed and are negative.        Past Medical History:   Diagnosis Date    Anemia     Arthritis     Diabetes mellitus     Encounter for blood transfusion     Impaired mobility      Past Surgical History:   Procedure Laterality Date    APPENDECTOMY      BACK SURGERY      3/2015    CHOLECYSTECTOMY      COLONOSCOPY      EYE SURGERY Bilateral     cataract    FRACTURE SURGERY      right ankle 2000    HYSTERECTOMY      JOINT REPLACEMENT      left hip, right knee     History reviewed. No pertinent family history.    Review of patient's allergies indicates:  No Known Allergies    Current Outpatient Medications:     diclofenac sodium (VOLTAREN) 1 % Gel, Apply 4 g topically 4 (four) times daily as needed (arthritis pain)., Disp: 450 g, Rfl: 3    EScitalopram oxalate (LEXAPRO) 10 MG tablet, Take 1 tablet (10 mg total) by mouth once daily. For  "mood, Disp: 90 tablet, Rfl: 3    FREESTYLE INSULINX Misc, , Disp: , Rfl:     FREESTYLE LANCETS 28 gauge lancets, , Disp: , Rfl:     FREESTYLE LITE STRIPS Strp, , Disp: , Rfl:     furosemide (LASIX) 20 MG tablet, Take 1-2 tablets daily if needed for swelling., Disp: 180 tablet, Rfl: 3    gabapentin (NEURONTIN) 300 MG capsule, Take 1 capsule (300 mg total) by mouth every evening. For diabetic neuropathy, Disp: 90 capsule, Rfl: 3    LIDOcaine (LIDODERM) 5 %, Place 1 patch onto the skin once daily., Disp: 90 patch, Rfl: 3    metFORMIN (GLUCOPHAGE) 500 MG tablet, Take 1 tablet (500 mg total) by mouth daily with dinner or evening meal. For diabetes, Disp: 90 tablet, Rfl: 3    multivitamin/iron/folic acid (CENTRUM WOMEN ORAL), Take by mouth., Disp: , Rfl:     omega-3 fatty acids-fish oil 340-1,000 mg Cap, Take 1,000 mg by mouth., Disp: , Rfl:     potassium chloride (KLOR-CON) 8 MEQ TbSR, Take 1 tablet (8 mEq total) by mouth 2 (two) times daily as needed (with Lasix for fluid)., Disp: 180 tablet, Rfl: 3    SITagliptin phosphate (JANUVIA) 100 MG Tab, Take 1 tablet (100 mg total) by mouth once daily. For diabetes, Disp: 90 tablet, Rfl: 3    traZODone (DESYREL) 50 MG tablet, Take 1 tablet (50 mg total) by mouth nightly as needed for Insomnia., Disp: 90 tablet, Rfl: 3    atorvastatin (LIPITOR) 40 MG tablet, Take 1 tablet (40 mg total) by mouth every evening. For cholesterol, Disp: 90 tablet, Rfl: 3    Current Facility-Administered Medications:     cyanocobalamin injection 1,000 mcg, 1,000 mcg, Intramuscular, Q30 Days, Dariana Martin MD, 1,000 mcg at 08/17/23 1426    cyanocobalamin injection 1,000 mcg, 1,000 mcg, Intramuscular, Once, Dariana Martin MD      Objective:      /64 (BP Location: Right arm, Patient Position: Sitting, BP Method: Medium (Manual))   Pulse 71   Ht 5' 2" (1.575 m)   Wt 59.2 kg (130 lb 10 oz)   SpO2 100%   BMI 23.89 kg/m²   Physical Exam  Vitals reviewed.   Constitutional:       General: " She is not in acute distress.     Appearance: Normal appearance. She is normal weight. She is not ill-appearing or toxic-appearing.   Eyes:      General: No scleral icterus.        Right eye: No discharge.         Left eye: No discharge.   Cardiovascular:      Rate and Rhythm: Normal rate and regular rhythm.      Heart sounds: Murmur heard.   Pulmonary:      Effort: Pulmonary effort is normal. No respiratory distress.      Breath sounds: Normal breath sounds.   Musculoskeletal:      Right lower leg: Edema (1+) present.      Left lower leg: Edema (2+) present.      Comments: Using wheeled walker   Skin:     General: Skin is warm and dry.      Capillary Refill: Capillary refill takes less than 2 seconds.      Coloration: Skin is not jaundiced or pale.   Neurological:      Mental Status: She is alert and oriented to person, place, and time.   Psychiatric:         Mood and Affect: Mood normal.         Behavior: Behavior normal.         Thought Content: Thought content normal.         Judgment: Judgment normal.         Assessment:       1. Type II diabetes mellitus with neurological manifestations    2. Stage 3 chronic kidney disease, unspecified whether stage 3a or 3b CKD    3. Recurrent falls    4. Low serum vitamin B12    5. Impingement syndrome of shoulder region, unspecified laterality    6. Diabetic peripheral neuropathy associated with type 2 diabetes mellitus    7. Muscle weakness of all 4 extremities        Plan:       Type II diabetes mellitus with neurological manifestations  -     cyanocobalamin injection 1,000 mcg    Stage 3 chronic kidney disease, unspecified whether stage 3a or 3b CKD    Recurrent falls  -     HME - OTHER    Low serum vitamin B12  -     cyanocobalamin injection 1,000 mcg    Impingement syndrome of shoulder region, unspecified laterality  -     HME - OTHER    Diabetic peripheral neuropathy associated with type 2 diabetes mellitus  -     HME - OTHER    Muscle weakness of all 4 extremities  -      HME - OTHER    B12 injection today and monthly  No med changes today  Continue PT and OT  Recommend motorized scooter to assist with ADLs inside the home. Patient has significant debility such that a manual wheelchair would not satisfy the need for assistive device. Patient has difficulty with ambulation using rolling walker secondary to DPN as well as muscle weakness and overall debility. She continues to have falls despite using her current devices.        Previous records in Epic were reviewed, including the last 3 months of encounters, imaging, laboratory, and pathology reports.    Strict return precautions reviewed and patient verbalized understanding. Risks, benefits, and alternatives to the plan were reviewed in detail and all questions answered to the patient's satisfaction. Patient agrees to return to clinic or ER if symptoms worsen. 40 minutes total were spent on today's visit, not limited to but including time based on counseling and coordination of care.    Patient instructed that best way to communicate with my office staff is for patient to get on the [facility] patient portal to expedite communication and communication issues that may occur.  Patient was given instructions on how to get on the portal.  I encouraged patient to obtain portal access as well.  Ultimately it is up to the patient to obtain access.  Patient voiced understanding.    This note was created using [employer] voice recognition software that occasionally may misinterpret phrases or words.    Follow up in about 3 months (around 11/17/2023) for f/u DM, recurrent falls.

## 2023-08-18 DIAGNOSIS — E78.2 MIXED HYPERLIPIDEMIA: ICD-10-CM

## 2023-08-18 DIAGNOSIS — E11.49 TYPE II DIABETES MELLITUS WITH NEUROLOGICAL MANIFESTATIONS: ICD-10-CM

## 2023-08-18 RX ORDER — ATORVASTATIN CALCIUM 40 MG/1
40 TABLET, FILM COATED ORAL NIGHTLY
Qty: 90 TABLET | Refills: 3 | Status: SHIPPED | OUTPATIENT
Start: 2023-08-18 | End: 2023-10-18 | Stop reason: SDUPTHER

## 2023-08-24 ENCOUNTER — EXTERNAL HOME HEALTH (OUTPATIENT)
Dept: HOME HEALTH SERVICES | Facility: HOSPITAL | Age: 88
End: 2023-08-24
Payer: MEDICARE

## 2023-09-03 PROBLEM — R29.6 RECURRENT FALLS: Status: ACTIVE | Noted: 2023-09-03

## 2023-09-05 ENCOUNTER — PATIENT OUTREACH (OUTPATIENT)
Dept: ADMINISTRATIVE | Facility: HOSPITAL | Age: 88
End: 2023-09-05
Payer: MEDICARE

## 2023-09-05 NOTE — PROGRESS NOTES
Population Health Chart Review & Patient Outreach Details:     Reason for Outreach Encounter:     [x]  Non-Compliant Report   []  Payor Report ([employer], PHN, BCBS, MSSP, MCIP, UHC, etc.)   []  Pre-Visit Chart Review     Updates Requested / Reviewed:     []  Care Everywhere    []     []  External Sources ([facility], [facility], DIS, etc.)   [x]  Care Team Updated    Patient Outreach Method:    []  Telephone Outreach Completed   [] Successful   [] Left Voicemail   [] Unable to Contact (wrong number, no voicemail)  []  [facility] Outreach Sent  []  Letter Outreach Mailed  []  Fax Sent for External Records  [x]  External Records Upload    Health Maintenance Topics Addressed and Outreach Outcomes / Actions Taken:        []      Breast Cancer Screening []  Mammo Scheduled      []  External Records Requested     []  Added Reminder to Complete to Upcoming Primary Care Appt Notes     []  Patient Declined     []  Patient Will Call Back to Schedule     []  Patient Will Schedule with External Provider / Order Routed if Applicable             []       Cervical Cancer Screening []  Pap Scheduled      []  External Records Requested     []  Added Reminder to Complete to Upcoming Primary Care Appt Notes     []  Patient Declined     []  Patient Will Call Back to Schedule     []  Patient Will Schedule with External Provider               []          Colorectal Cancer Screening []  Colonoscopy Case Request or Referral Placed     []  External Records Requested     []  Added Reminder to Complete to Upcoming Primary Care Appt Notes     []  Patient Declined     []  Patient Will Call Back to Schedule     []  Patient Will Schedule with External Provider     []  Fit Kit Mailed (add the SmartPhrase under additional notes)     []  Reminded Patient to Complete Home Test             [x]      Diabetic Eye Exam []  Eye Camera Scheduled or Optometry Referral Placed     []  External Records Requested     []  Added Reminder to Complete to  Upcoming Primary Care Appt Notes     []  Patient Declined     []  Patient Will Call Back to Schedule     []  Patient Will Schedule with External Provider             []      Blood Pressure Control []  Primary Care Follow Up Visit Scheduled     []  Remote Blood Pressure Reading Captured     []  Added Reminder to Complete to Upcoming Primary Care Appt Notes     []  Patient Declined     []  Patient Will Call Back / Patient Will Send Portal Message with Reading     []  Patient Will Call Back to Schedule Provider Visit             []       HbA1c & Other Labs []  Lab Appt Scheduled for Due Labs     []  Primary Care Follow Up Visit Scheduled      []  Reminded Patient to Complete Home Test     []  Added Reminder to Complete to Upcoming Primary Care Appt Notes     []  Patient Declined     []  Patient Will Call Back to Schedule     []  Patient Will Schedule with External Provider / Order Routed if Applicable           []    Schedule Primary Care Appt []  Primary Care Appt Scheduled     []  Patient Declined     []  Patient Will Call Back to Schedule     []  Pt Established with External Provider & Updated Care Team             []      Medication Adherence []  Primary Care Appointment Scheduled     []  Added Reminder to Upcoming Primary Care Appt Notes     []  Patient Reminded to  Prescription     []  Patient Declined, Provider Notified if Needed     []  Sent Provider Message to Review and/or Add Exclusion to Problem List             []      Osteoporosis Screening []  DXA Appointment Scheduled     []  External Records Requested     []  Added Reminder to Complete to Upcoming Primary Care Appt Notes     []  Patient Declined     []  Patient Will Call Back to Schedule     []  Patient Will Schedule with External Provider / Order Routed if Applicable     Additional Care Coordinator Notes:     UPLOADED 08/09/2023 EYE EXAM RESULTS    Further Action Needed If Patient Returns Outreach:

## 2023-09-18 ENCOUNTER — OFFICE VISIT (OUTPATIENT)
Dept: FAMILY MEDICINE | Facility: CLINIC | Age: 88
End: 2023-09-18
Payer: MEDICARE

## 2023-09-18 VITALS
SYSTOLIC BLOOD PRESSURE: 140 MMHG | OXYGEN SATURATION: 96 % | BODY MASS INDEX: 23.88 KG/M2 | DIASTOLIC BLOOD PRESSURE: 80 MMHG | WEIGHT: 129.75 LBS | HEART RATE: 65 BPM | HEIGHT: 62 IN

## 2023-09-18 DIAGNOSIS — R07.9 CHEST PAIN, UNSPECIFIED TYPE: Primary | ICD-10-CM

## 2023-09-18 DIAGNOSIS — E11.49 TYPE II DIABETES MELLITUS WITH NEUROLOGICAL MANIFESTATIONS: ICD-10-CM

## 2023-09-18 DIAGNOSIS — R01.1 CARDIAC MURMUR, UNSPECIFIED: ICD-10-CM

## 2023-09-18 DIAGNOSIS — R03.0 ELEVATED BLOOD PRESSURE READING: ICD-10-CM

## 2023-09-18 DIAGNOSIS — I73.9 PVD (PERIPHERAL VASCULAR DISEASE): ICD-10-CM

## 2023-09-18 DIAGNOSIS — Z87.891 FORMER SMOKER: ICD-10-CM

## 2023-09-18 DIAGNOSIS — D64.9 ANEMIA, UNSPECIFIED TYPE: ICD-10-CM

## 2023-09-18 LAB
ALBUMIN SERPL BCP-MCNC: 3.8 G/DL (ref 3.5–5.2)
ALP SERPL-CCNC: 81 U/L (ref 55–135)
ALT SERPL W/O P-5'-P-CCNC: 13 U/L (ref 10–44)
ANION GAP SERPL CALC-SCNC: 10 MMOL/L (ref 8–16)
AST SERPL-CCNC: 16 U/L (ref 10–40)
BASOPHILS # BLD AUTO: 0.03 K/UL (ref 0–0.2)
BASOPHILS NFR BLD: 0.4 % (ref 0–1.9)
BILIRUB SERPL-MCNC: 0.4 MG/DL (ref 0.1–1)
BUN SERPL-MCNC: 33 MG/DL (ref 8–23)
CALCIUM SERPL-MCNC: 9.5 MG/DL (ref 8.7–10.5)
CHLORIDE SERPL-SCNC: 104 MMOL/L (ref 95–110)
CO2 SERPL-SCNC: 26 MMOL/L (ref 23–29)
CREAT SERPL-MCNC: 1.1 MG/DL (ref 0.5–1.4)
DIFFERENTIAL METHOD: ABNORMAL
EOSINOPHIL # BLD AUTO: 0.1 K/UL (ref 0–0.5)
EOSINOPHIL NFR BLD: 1.2 % (ref 0–8)
ERYTHROCYTE [DISTWIDTH] IN BLOOD BY AUTOMATED COUNT: 14.9 % (ref 11.5–14.5)
EST. GFR  (NO RACE VARIABLE): 48.3 ML/MIN/1.73 M^2
ESTIMATED AVG GLUCOSE: 120 MG/DL (ref 68–131)
GLUCOSE SERPL-MCNC: 142 MG/DL (ref 70–110)
HBA1C MFR BLD: 5.8 % (ref 4–5.6)
HCT VFR BLD AUTO: 31.3 % (ref 37–48.5)
HGB BLD-MCNC: 9.6 G/DL (ref 12–16)
IMM GRANULOCYTES # BLD AUTO: 0.05 K/UL (ref 0–0.04)
IMM GRANULOCYTES NFR BLD AUTO: 0.7 % (ref 0–0.5)
IRON SERPL-MCNC: 45 UG/DL (ref 30–160)
LYMPHOCYTES # BLD AUTO: 1.2 K/UL (ref 1–4.8)
LYMPHOCYTES NFR BLD: 16.6 % (ref 18–48)
MCH RBC QN AUTO: 28.1 PG (ref 27–31)
MCHC RBC AUTO-ENTMCNC: 30.7 G/DL (ref 32–36)
MCV RBC AUTO: 92 FL (ref 82–98)
MONOCYTES # BLD AUTO: 0.4 K/UL (ref 0.3–1)
MONOCYTES NFR BLD: 5.7 % (ref 4–15)
NEUTROPHILS # BLD AUTO: 5.7 K/UL (ref 1.8–7.7)
NEUTROPHILS NFR BLD: 75.4 % (ref 38–73)
NRBC BLD-RTO: 0 /100 WBC
PLATELET # BLD AUTO: 189 K/UL (ref 150–450)
PMV BLD AUTO: 10 FL (ref 9.2–12.9)
POTASSIUM SERPL-SCNC: 4.4 MMOL/L (ref 3.5–5.1)
PROT SERPL-MCNC: 7.3 G/DL (ref 6–8.4)
RBC # BLD AUTO: 3.42 M/UL (ref 4–5.4)
SATURATED IRON: 10 % (ref 20–50)
SODIUM SERPL-SCNC: 140 MMOL/L (ref 136–145)
TOTAL IRON BINDING CAPACITY: 465 UG/DL (ref 250–450)
TRANSFERRIN SERPL-MCNC: 314 MG/DL (ref 200–375)
WBC # BLD AUTO: 7.49 K/UL (ref 3.9–12.7)

## 2023-09-18 PROCEDURE — 83036 HEMOGLOBIN GLYCOSYLATED A1C: CPT | Performed by: FAMILY MEDICINE

## 2023-09-18 PROCEDURE — 99214 PR OFFICE/OUTPT VISIT, EST, LEVL IV, 30-39 MIN: ICD-10-PCS | Mod: S$PBB,,, | Performed by: FAMILY MEDICINE

## 2023-09-18 PROCEDURE — 99999 PR PBB SHADOW E&M-EST. PATIENT-LVL V: CPT | Mod: PBBFAC,,, | Performed by: FAMILY MEDICINE

## 2023-09-18 PROCEDURE — 82728 ASSAY OF FERRITIN: CPT | Performed by: FAMILY MEDICINE

## 2023-09-18 PROCEDURE — 93005 ELECTROCARDIOGRAM TRACING: CPT | Mod: PBBFAC,PN | Performed by: INTERNAL MEDICINE

## 2023-09-18 PROCEDURE — 82043 UR ALBUMIN QUANTITATIVE: CPT | Performed by: FAMILY MEDICINE

## 2023-09-18 PROCEDURE — 99215 OFFICE O/P EST HI 40 MIN: CPT | Mod: PBBFAC,PN | Performed by: FAMILY MEDICINE

## 2023-09-18 PROCEDURE — 80053 COMPREHEN METABOLIC PANEL: CPT | Performed by: FAMILY MEDICINE

## 2023-09-18 PROCEDURE — 85025 COMPLETE CBC W/AUTO DIFF WBC: CPT | Performed by: FAMILY MEDICINE

## 2023-09-18 PROCEDURE — 93010 EKG 12-LEAD: ICD-10-PCS | Mod: S$PBB,,, | Performed by: INTERNAL MEDICINE

## 2023-09-18 PROCEDURE — 99214 OFFICE O/P EST MOD 30 MIN: CPT | Mod: S$PBB,,, | Performed by: FAMILY MEDICINE

## 2023-09-18 PROCEDURE — 93010 ELECTROCARDIOGRAM REPORT: CPT | Mod: S$PBB,,, | Performed by: INTERNAL MEDICINE

## 2023-09-18 PROCEDURE — 99999 PR PBB SHADOW E&M-EST. PATIENT-LVL V: ICD-10-PCS | Mod: PBBFAC,,, | Performed by: FAMILY MEDICINE

## 2023-09-18 PROCEDURE — 83540 ASSAY OF IRON: CPT | Performed by: FAMILY MEDICINE

## 2023-09-18 PROCEDURE — 84466 ASSAY OF TRANSFERRIN: CPT | Performed by: FAMILY MEDICINE

## 2023-09-19 LAB
ALBUMIN/CREAT UR: 719.6 UG/MG (ref 0–30)
CREAT UR-MCNC: 51 MG/DL (ref 15–325)
FERRITIN SERPL-MCNC: 82 NG/ML (ref 20–300)
MICROALBUMIN UR DL<=1MG/L-MCNC: 367 UG/ML

## 2023-09-22 ENCOUNTER — TELEPHONE (OUTPATIENT)
Dept: FAMILY MEDICINE | Facility: CLINIC | Age: 88
End: 2023-09-22
Payer: MEDICARE

## 2023-09-22 NOTE — TELEPHONE ENCOUNTER
----- Message from Deandre Castle sent at 9/21/2023 10:13 AM CDT -----  Type: Needs Medical Advice  Who Called:  [facility] -Dr. Shahriar Hensley Call Back Number: Fax [phone] --  Phone [phone]    Additional Information: Caller states that she would like the patient's Cardio referral and her EKG results and any office notes faxed to the above number.  Caller states that the patient has called their office for an appointment.

## 2023-09-29 PROCEDURE — G0179 PR HOME HEALTH MD RECERTIFICATION: ICD-10-PCS | Mod: ,,, | Performed by: FAMILY MEDICINE

## 2023-09-29 PROCEDURE — G0179 MD RECERTIFICATION HHA PT: HCPCS | Mod: ,,, | Performed by: FAMILY MEDICINE

## 2023-10-10 ENCOUNTER — EXTERNAL HOME HEALTH (OUTPATIENT)
Dept: HOME HEALTH SERVICES | Facility: HOSPITAL | Age: 88
End: 2023-10-10
Payer: MEDICARE

## 2023-10-13 RX ORDER — TRAZODONE HYDROCHLORIDE 50 MG/1
50 TABLET ORAL
COMMUNITY
Start: 2023-01-11 | End: 2023-10-18 | Stop reason: SDUPTHER

## 2023-10-13 RX ORDER — DICLOFENAC SODIUM 10 MG/G
GEL TOPICAL
COMMUNITY
Start: 2023-06-14 | End: 2023-10-18

## 2023-10-13 RX ORDER — LIDOCAINE 50 MG/G
PATCH TOPICAL
COMMUNITY
Start: 2023-06-14 | End: 2023-10-18

## 2023-10-13 RX ORDER — GABAPENTIN 300 MG/1
CAPSULE ORAL
COMMUNITY
Start: 2023-06-14 | End: 2023-10-18 | Stop reason: SDUPTHER

## 2023-10-13 RX ORDER — OXYBUTYNIN CHLORIDE 5 MG/1
5 TABLET ORAL
COMMUNITY
Start: 2022-11-09 | End: 2023-10-18

## 2023-10-13 RX ORDER — METFORMIN HYDROCHLORIDE 500 MG/1
500 TABLET ORAL
COMMUNITY
Start: 2023-01-11 | End: 2023-10-18

## 2023-10-13 RX ORDER — POTASSIUM CHLORIDE 600 MG/1
TABLET, FILM COATED, EXTENDED RELEASE ORAL
COMMUNITY
Start: 2023-06-14 | End: 2023-10-18

## 2023-10-16 ENCOUNTER — OFFICE VISIT (OUTPATIENT)
Dept: PODIATRY | Facility: CLINIC | Age: 88
End: 2023-10-16
Payer: MEDICARE

## 2023-10-16 VITALS
RESPIRATION RATE: 18 BRPM | SYSTOLIC BLOOD PRESSURE: 92 MMHG | HEIGHT: 62 IN | HEART RATE: 57 BPM | BODY MASS INDEX: 22.08 KG/M2 | WEIGHT: 120 LBS | DIASTOLIC BLOOD PRESSURE: 64 MMHG

## 2023-10-16 DIAGNOSIS — L84 PRE-ULCERATIVE CALLUSES: ICD-10-CM

## 2023-10-16 DIAGNOSIS — E11.59 CONTROLLED TYPE 2 DIABETES MELLITUS WITH OTHER CIRCULATORY COMPLICATION, UNSPECIFIED WHETHER LONG TERM INSULIN USE: ICD-10-CM

## 2023-10-16 DIAGNOSIS — M21.621 TAILOR'S BUNION OF RIGHT FOOT: ICD-10-CM

## 2023-10-16 DIAGNOSIS — M20.61 ACQUIRED DEFORMITY OF TOE, RIGHT: Primary | ICD-10-CM

## 2023-10-16 DIAGNOSIS — R60.0 EDEMA OF BOTH LOWER EXTREMITIES: ICD-10-CM

## 2023-10-16 PROCEDURE — 99999 PR PBB SHADOW E&M-EST. PATIENT-LVL V: ICD-10-PCS | Mod: PBBFAC,,, | Performed by: PODIATRIST

## 2023-10-16 PROCEDURE — 99215 OFFICE O/P EST HI 40 MIN: CPT | Mod: PBBFAC,PN | Performed by: PODIATRIST

## 2023-10-16 PROCEDURE — 99213 PR OFFICE/OUTPT VISIT, EST, LEVL III, 20-29 MIN: ICD-10-PCS | Mod: S$PBB,,, | Performed by: PODIATRIST

## 2023-10-16 PROCEDURE — 99213 OFFICE O/P EST LOW 20 MIN: CPT | Mod: S$PBB,,, | Performed by: PODIATRIST

## 2023-10-16 PROCEDURE — 99999 PR PBB SHADOW E&M-EST. PATIENT-LVL V: CPT | Mod: PBBFAC,,, | Performed by: PODIATRIST

## 2023-10-16 RX ORDER — AMITRIPTYLINE HYDROCHLORIDE 10 MG/1
TABLET, FILM COATED ORAL
COMMUNITY
End: 2024-02-19 | Stop reason: SDUPTHER

## 2023-10-16 RX ORDER — SULFAMETHOXAZOLE AND TRIMETHOPRIM 800; 160 MG/1; MG/1
1 TABLET ORAL 2 TIMES DAILY
COMMUNITY
End: 2023-10-18

## 2023-10-16 RX ORDER — METFORMIN HYDROCHLORIDE 500 MG/1
TABLET, FILM COATED, EXTENDED RELEASE ORAL
COMMUNITY
End: 2023-10-16

## 2023-10-16 NOTE — PROGRESS NOTES
Subjective:       Patient ID: Cinthya Cuevas is a 88 y.o. female.    Chief Complaint: Follow-up, Toe Pain, Foot Pain, and Foot Swelling  Patient with diabetes presents with complaint of increased pain on the side of the bone right 5th digit.  Patient relates when this areas treated and deep center dug out she gets a lot of relief.  She states within a few weeks pain starts to escalate, she will be comfortable in her shoes for a while and then it will be severe and she will need to change to her slippers.  It is made it difficult to find comfortable shoes, states they are all wide good slip-on/tennis shoes.  She is never barefoot  Has been working on this callus herself along with 1 on the side of the right great toe.  She states instruments slipped off the right great toe and caused a scratch on the top of the foot which is not painful.  Pain level side of the 5th met head 4/10      Past Medical History:   Diagnosis Date    Anemia     Arthritis     Diabetes mellitus     Encounter for blood transfusion     Impaired mobility     Retinal hemorrhage of right eye 2023    EYE EXAM IN MEDIA     Past Surgical History:   Procedure Laterality Date    APPENDECTOMY      BACK SURGERY      3/2015    CHOLECYSTECTOMY      COLONOSCOPY      EYE SURGERY Bilateral     cataract    FRACTURE SURGERY      right ankle     HYSTERECTOMY      JOINT REPLACEMENT      left hip, right knee     History reviewed. No pertinent family history.  Social History     Socioeconomic History    Marital status:    Tobacco Use    Smoking status: Former     Current packs/day: 0.00     Average packs/day: 0.3 packs/day for 31.0 years (7.8 ttl pk-yrs)     Types: Cigarettes     Start date: 1957     Quit date: 1988     Years since quittin.8    Smokeless tobacco: Never   Substance and Sexual Activity    Alcohol use: No    Drug use: No       Current Outpatient Medications   Medication Sig Dispense Refill    atorvastatin (LIPITOR) 40  MG tablet Take 1 tablet (40 mg total) by mouth every evening. For cholesterol 90 tablet 3    diclofenac sodium (VOLTAREN) 1 % Gel Apply 4 g topically 4 (four) times daily as needed (arthritis pain). 450 g 3    diclofenac sodium (VOLTAREN) 1 % Gel   See Instructions, # 500 g, 3 total refill(s), Hard Stop      EScitalopram oxalate (LEXAPRO) 10 MG tablet Take 1 tablet (10 mg total) by mouth once daily. For mood 90 tablet 3    FREESTYLE INSULINX Misc       FREESTYLE LANCETS 28 gauge lancets       FREESTYLE LITE STRIPS Strp       furosemide (LASIX) 20 MG tablet Take 1-2 tablets daily if needed for swelling. 180 tablet 3    gabapentin (NEURONTIN) 300 MG capsule   See Instructions, # 90 EA, 3 total refill(s), Hard Stop      LIDOcaine (LIDODERM) 5 % Place 1 patch onto the skin once daily. 90 patch 3    LIDOcaine (LIDODERM) 5 %   See Instructions, 0, # 90 EA, 3 total refill(s), Hard Stop      metFORMIN (GLUCOPHAGE) 500 MG tablet Take 1 tablet (500 mg total) by mouth daily with dinner or evening meal. For diabetes 90 tablet 3    metFORMIN (GLUCOPHAGE) 500 MG tablet Take 500 mg by mouth.      multivitamin/iron/folic acid (CENTRUM WOMEN ORAL) Take by mouth.      omega-3 fatty acids-fish oil 340-1,000 mg Cap Take 1,000 mg by mouth.      oxybutynin (DITROPAN) 5 MG Tab Take 5 mg by mouth.      potassium chloride (KLOR-CON) 8 MEQ TbSR Take 1 tablet (8 mEq total) by mouth 2 (two) times daily as needed (with Lasix for fluid). 180 tablet 3    potassium chloride (KLOR-CON) 8 MEQ TbSR   See Instructions, # 180 EA, 3 total refill(s), Hard Stop      SITagliptin phosphate (JANUVIA) 100 MG Tab Take 1 tablet (100 mg total) by mouth once daily. For diabetes 90 tablet 3    sulfamethoxazole-trimethoprim 800-160mg (BACTRIM DS) 800-160 mg Tab Take 1 tablet by mouth 2 (two) times daily.      traZODone (DESYREL) 50 MG tablet Take 50 mg by mouth.      amitriptyline (ELAVIL) 10 MG tablet        Current Facility-Administered Medications   Medication  "Dose Route Frequency Provider Last Rate Last Admin    cyanocobalamin injection 1,000 mcg  1,000 mcg Intramuscular Q30 Days Dariana Martin MD   1,000 mcg at 08/17/23 1426    cyanocobalamin injection 1,000 mcg  1,000 mcg Intramuscular Once Dariana Martin MD         Review of patient's allergies indicates:  No Known Allergies        Review of Systems   Cardiovascular:  Positive for leg swelling.        L>R   Musculoskeletal:  Positive for gait problem.        Wheelchair today   All other systems reviewed and are negative.      Objective:      Vitals:    10/16/23 1054   BP: 92/64   Pulse: (!) 57   Resp: 18   Weight: 54.4 kg (120 lb)   Height: 5' 2" (1.575 m)     Physical Exam  Vitals and nursing note reviewed.   Constitutional:       General: She is not in acute distress.     Appearance: Normal appearance.   Cardiovascular:      Pulses:           Dorsalis pedis pulses are 1+ on the right side and 1+ on the left side.        Posterior tibial pulses are 0 on the right side and 0 on the left side.   Pulmonary:      Effort: Pulmonary effort is normal.   Musculoskeletal:         General: Tenderness present.      Right foot: Decreased range of motion (Arthritic and degenerative changes bilateral feet). Bunion (mild HAV and tailor's bunion bilateral) present.      Left foot: Decreased range of motion. Bunion present.      Comments: pain lateral 5th metatarsal head, painful lesion,  prominent due to tailor's bunion deformity. Limited mobility   Feet:      Right foot:      Skin integrity: Skin breakdown and callus (painful preulcerative callus/ hyperkeratotic tissue with deep IPK  lateral 5th met head right, no discoloration, no skin opening.  Mild callus plantar medial 1st MPJ right no pain.  Healing abrasion dorsal right hallux) present.      Left foot:      Skin integrity: No skin breakdown or callus.   Skin:     Capillary Refill: Capillary refill takes 2 to 3 seconds.   Neurological:      General: No focal deficit " present.      Mental Status: She is alert.   Psychiatric:         Behavior: Behavior normal.         Thought Content: Thought content normal.                        Assessment:       1. Acquired deformity of toe, right    2. Edema of both lower extremities    3. Tailor's bunion of right foot    4. Controlled type 2 diabetes mellitus with other circulatory complication, unspecified whether long term insulin use    5. Pre-ulcerative calluses              Plan:         Reviewed with patient pressure with tailor's bunion, prominent bone and explained increased pain in this areas due to sensitivity/pressure of the underlying nerve.  Patient understands there is not a lot of cushion, skin and bone in this area and deep seed corn can become severely painful with surrounding inflammation  Patient has Lidoderm patches, instructed how to apply to the area either during the day or overnight, not both  Advised patient she can continue to smooth callus, do not use a sharp instrument  Has a history of ulcer between the 4th and 5th toes right foot, advised patient this area is at risk, she needs to check it daily for any changes  Debridement of pre ulcerative callus lateral 5th met head, removed IPK, no skin break or bleeding present  Debrided callus medial 1st MPJ left, no discoloration  Reviewed appropriate shoes at length today  Reviewed diabetic education and daily foot checks, contact office immediately with any changes or concerns  Patient was in understanding and agreement with treatment plan.  I counseled the patient on their conditions, implications and medical management.  Instructed patient to contact the office with any changes, questions, concerns, worsening of symptoms.   Total face to face time 20 minutes, exam, assessment, treatment, discussion, additional time for review of chart prior to and following appointment and visit documentation, consultation and coordination of care.   Follow up 2 months    This note was  created using [employer] voice recognition software that occasionally misinterpreted phrases or words.

## 2023-10-18 ENCOUNTER — OFFICE VISIT (OUTPATIENT)
Dept: FAMILY MEDICINE | Facility: CLINIC | Age: 88
End: 2023-10-18
Payer: MEDICARE

## 2023-10-18 VITALS
SYSTOLIC BLOOD PRESSURE: 108 MMHG | DIASTOLIC BLOOD PRESSURE: 64 MMHG | HEART RATE: 67 BPM | OXYGEN SATURATION: 97 % | WEIGHT: 128 LBS | HEIGHT: 62 IN | BODY MASS INDEX: 23.55 KG/M2

## 2023-10-18 DIAGNOSIS — E78.2 MIXED HYPERLIPIDEMIA: ICD-10-CM

## 2023-10-18 DIAGNOSIS — R26.81 UNSTEADY GAIT: ICD-10-CM

## 2023-10-18 DIAGNOSIS — M25.551 CHRONIC RIGHT HIP PAIN: ICD-10-CM

## 2023-10-18 DIAGNOSIS — G89.29 CHRONIC RIGHT HIP PAIN: ICD-10-CM

## 2023-10-18 DIAGNOSIS — R94.31 ABNORMAL EKG: ICD-10-CM

## 2023-10-18 DIAGNOSIS — G47.00 INSOMNIA, UNSPECIFIED TYPE: ICD-10-CM

## 2023-10-18 DIAGNOSIS — I25.10 ARTERIOSCLEROSIS OF CORONARY ARTERY: ICD-10-CM

## 2023-10-18 DIAGNOSIS — F32.A DEPRESSION, UNSPECIFIED DEPRESSION TYPE: ICD-10-CM

## 2023-10-18 DIAGNOSIS — E11.49 TYPE II DIABETES MELLITUS WITH NEUROLOGICAL MANIFESTATIONS: Primary | ICD-10-CM

## 2023-10-18 DIAGNOSIS — I49.8 BIGEMINY: ICD-10-CM

## 2023-10-18 DIAGNOSIS — E11.42 DIABETIC PERIPHERAL NEUROPATHY ASSOCIATED WITH TYPE 2 DIABETES MELLITUS: ICD-10-CM

## 2023-10-18 DIAGNOSIS — N18.31 STAGE 3A CHRONIC KIDNEY DISEASE: ICD-10-CM

## 2023-10-18 DIAGNOSIS — E53.8 B12 DEFICIENCY: ICD-10-CM

## 2023-10-18 DIAGNOSIS — G89.29 CHRONIC RIGHT SHOULDER PAIN: ICD-10-CM

## 2023-10-18 DIAGNOSIS — M25.50 ARTHRALGIA, UNSPECIFIED JOINT: ICD-10-CM

## 2023-10-18 DIAGNOSIS — M25.511 CHRONIC RIGHT SHOULDER PAIN: ICD-10-CM

## 2023-10-18 DIAGNOSIS — R60.9 DEPENDENT EDEMA: ICD-10-CM

## 2023-10-18 PROCEDURE — 99215 OFFICE O/P EST HI 40 MIN: CPT | Mod: S$PBB,,, | Performed by: FAMILY MEDICINE

## 2023-10-18 PROCEDURE — 99999PBSHW PR PBB SHADOW TECHNICAL ONLY FILED TO HB: ICD-10-PCS | Mod: PBBFAC,,,

## 2023-10-18 PROCEDURE — 99999 PR PBB SHADOW E&M-EST. PATIENT-LVL IV: CPT | Mod: PBBFAC,,, | Performed by: FAMILY MEDICINE

## 2023-10-18 PROCEDURE — 99999PBSHW PR PBB SHADOW TECHNICAL ONLY FILED TO HB: Mod: PBBFAC,,,

## 2023-10-18 PROCEDURE — 96372 THER/PROPH/DIAG INJ SC/IM: CPT | Mod: PBBFAC,PN

## 2023-10-18 PROCEDURE — 99999 PR PBB SHADOW E&M-EST. PATIENT-LVL IV: ICD-10-PCS | Mod: PBBFAC,,, | Performed by: FAMILY MEDICINE

## 2023-10-18 PROCEDURE — 99214 OFFICE O/P EST MOD 30 MIN: CPT | Mod: 25,PBBFAC,PN | Performed by: FAMILY MEDICINE

## 2023-10-18 PROCEDURE — 99215 PR OFFICE/OUTPT VISIT, EST, LEVL V, 40-54 MIN: ICD-10-PCS | Mod: S$PBB,,, | Performed by: FAMILY MEDICINE

## 2023-10-18 RX ORDER — GABAPENTIN 300 MG/1
300 CAPSULE ORAL NIGHTLY
Qty: 90 CAPSULE | Refills: 3 | Status: SHIPPED | OUTPATIENT
Start: 2023-10-18 | End: 2024-02-19 | Stop reason: SDUPTHER

## 2023-10-18 RX ORDER — FUROSEMIDE 20 MG/1
TABLET ORAL
Qty: 180 TABLET | Refills: 3 | Status: SHIPPED | OUTPATIENT
Start: 2023-10-18 | End: 2024-02-19 | Stop reason: SDUPTHER

## 2023-10-18 RX ORDER — METFORMIN HYDROCHLORIDE 500 MG/1
500 TABLET ORAL
Qty: 90 TABLET | Refills: 3 | Status: SHIPPED | OUTPATIENT
Start: 2023-10-18 | End: 2024-02-19 | Stop reason: SDUPTHER

## 2023-10-18 RX ORDER — CYANOCOBALAMIN 1000 UG/ML
1000 INJECTION, SOLUTION INTRAMUSCULAR; SUBCUTANEOUS ONCE
Status: COMPLETED | OUTPATIENT
Start: 2023-10-18 | End: 2023-10-18

## 2023-10-18 RX ORDER — DICLOFENAC SODIUM 10 MG/G
4 GEL TOPICAL 4 TIMES DAILY PRN
Qty: 450 G | Refills: 3 | Status: SHIPPED | OUTPATIENT
Start: 2023-10-18 | End: 2024-02-22 | Stop reason: SDUPTHER

## 2023-10-18 RX ORDER — ESCITALOPRAM OXALATE 20 MG/1
20 TABLET ORAL DAILY
Qty: 90 EACH | Refills: 3 | Status: SHIPPED | OUTPATIENT
Start: 2023-10-18 | End: 2024-02-19 | Stop reason: SDUPTHER

## 2023-10-18 RX ORDER — LIDOCAINE 50 MG/G
1 PATCH TOPICAL DAILY
Qty: 90 PATCH | Refills: 3 | Status: SHIPPED | OUTPATIENT
Start: 2023-10-18 | End: 2024-02-22 | Stop reason: SDUPTHER

## 2023-10-18 RX ORDER — POTASSIUM CHLORIDE 600 MG/1
8 TABLET, FILM COATED, EXTENDED RELEASE ORAL 2 TIMES DAILY PRN
Qty: 180 TABLET | Refills: 3 | Status: SHIPPED | OUTPATIENT
Start: 2023-10-18 | End: 2024-02-19 | Stop reason: SDUPTHER

## 2023-10-18 RX ORDER — ATORVASTATIN CALCIUM 40 MG/1
40 TABLET, FILM COATED ORAL NIGHTLY
Qty: 90 TABLET | Refills: 3 | Status: SHIPPED | OUTPATIENT
Start: 2023-10-18 | End: 2024-02-19 | Stop reason: SDUPTHER

## 2023-10-18 RX ORDER — TRAZODONE HYDROCHLORIDE 50 MG/1
50 TABLET ORAL NIGHTLY PRN
Qty: 90 TABLET | Refills: 3 | Status: SHIPPED | OUTPATIENT
Start: 2023-10-18 | End: 2024-02-19 | Stop reason: SDUPTHER

## 2023-10-18 RX ADMIN — CYANOCOBALAMIN 1000 MCG: 1000 INJECTION, SOLUTION INTRAMUSCULAR at 01:10

## 2023-10-18 NOTE — PROGRESS NOTES
Subjective:       Patient ID: Cinthya Cuevas is a 88 y.o. female.    Chief Complaint: Follow-up    She is changing to [facility], states she got flu shot when they came out to her assisted living home.    DM--blood sugars stable.    DPN--monthly foot care with podiatry.          11/9/2022    10:27 AM 8/17/2021     9:11 AM   Depression Patient Health Questionnaire   Over the last two weeks how often have you been bothered by little interest or pleasure in doing things Not at all Not at all   Over the last two weeks how often have you been bothered by feeling down, depressed or hopeless Not at all Not at all   PHQ-2 Total Score 0 0          No data to display                Review of Systems   All other systems reviewed and are negative.        Past Medical History:   Diagnosis Date    Anemia     Arthritis     Diabetes mellitus     Encounter for blood transfusion     Impaired mobility     Retinal hemorrhage of right eye 08/09/2023    EYE EXAM IN MEDIA    Ulcer of right foot with fat layer exposed 8/16/2022     Past Surgical History:   Procedure Laterality Date    APPENDECTOMY      BACK SURGERY      3/2015    CHOLECYSTECTOMY      COLONOSCOPY      EYE SURGERY Bilateral     cataract    FRACTURE SURGERY      right ankle 2000    HYSTERECTOMY      JOINT REPLACEMENT      left hip, right knee     History reviewed. No pertinent family history.    Review of patient's allergies indicates:  No Known Allergies    Current Outpatient Medications:     amitriptyline (ELAVIL) 10 MG tablet, , Disp: , Rfl:     FREESTYLE INSULINX Misc, , Disp: , Rfl:     FREESTYLE LANCETS 28 gauge lancets, , Disp: , Rfl:     FREESTYLE LITE STRIPS Strp, , Disp: , Rfl:     multivitamin/iron/folic acid (CENTRUM WOMEN ORAL), Take by mouth., Disp: , Rfl:     atorvastatin (LIPITOR) 40 MG tablet, Take 1 tablet (40 mg total) by mouth every evening. For cholesterol, Disp: 90 tablet, Rfl: 3    diclofenac sodium (VOLTAREN) 1 % Gel, Apply 4 g topically 4  "(four) times daily as needed (arthritis pain)., Disp: 450 g, Rfl: 3    EScitalopram oxalate (LEXAPRO) 20 MG tablet, Take 1 tablet (20 mg total) by mouth once daily., Disp: 90 each, Rfl: 3    furosemide (LASIX) 20 MG tablet, Take 1-2 tablets daily if needed for swelling., Disp: 180 tablet, Rfl: 3    gabapentin (NEURONTIN) 300 MG capsule, Take 1 capsule (300 mg total) by mouth every evening. For nerve pain, Disp: 90 capsule, Rfl: 3    LIDOcaine (LIDODERM) 5 %, Place 1 patch onto the skin once daily., Disp: 90 patch, Rfl: 3    metFORMIN (GLUCOPHAGE) 500 MG tablet, Take 1 tablet (500 mg total) by mouth daily with dinner or evening meal. For diabetes, Disp: 90 tablet, Rfl: 3    omega-3 fatty acids-fish oil 340-1,000 mg Cap, Take 1 capsule by mouth 2 (two) times daily. For cholesterol and heart, Disp: 180 capsule, Rfl: 3    potassium chloride (KLOR-CON) 8 MEQ TbSR, Take 1 tablet (8 mEq total) by mouth 2 (two) times daily as needed (with Lasix for fluid)., Disp: 180 tablet, Rfl: 3    SITagliptin phosphate (JANUVIA) 100 MG Tab, Take 1 tablet (100 mg total) by mouth once daily. For diabetes, Disp: 90 tablet, Rfl: 3    traZODone (DESYREL) 50 MG tablet, Take 1 tablet (50 mg total) by mouth nightly as needed for Insomnia., Disp: 90 tablet, Rfl: 3    Current Facility-Administered Medications:     cyanocobalamin injection 1,000 mcg, 1,000 mcg, Intramuscular, Q30 Days, Dariana Martin MD, 1,000 mcg at 08/17/23 1426    cyanocobalamin injection 1,000 mcg, 1,000 mcg, Intramuscular, Once, Dariana Martin MD      Objective:      /64 (BP Location: Left arm, Patient Position: Sitting, BP Method: Small (Manual))   Pulse 67   Ht 5' 2" (1.575 m)   Wt 58.1 kg (128 lb)   SpO2 97%   BMI 23.41 kg/m²   Physical Exam  Vitals and nursing note reviewed.   Constitutional:       General: She is not in acute distress.     Appearance: Normal appearance. She is normal weight. She is not ill-appearing, toxic-appearing or diaphoretic. "   Eyes:      General: No scleral icterus.        Right eye: No discharge.         Left eye: No discharge.   Cardiovascular:      Rate and Rhythm: Normal rate and regular rhythm.      Heart sounds: Murmur heard.   Pulmonary:      Effort: Pulmonary effort is normal. No respiratory distress.      Breath sounds: Normal breath sounds. No wheezing.   Abdominal:      General: There is no distension.   Musculoskeletal:      Cervical back: Neck supple.      Right lower leg: Edema (1+) present.      Left lower leg: Edema (2+) present.      Comments: Using wheeled walker   Skin:     General: Skin is warm and dry.      Capillary Refill: Capillary refill takes less than 2 seconds.      Coloration: Skin is not jaundiced or pale.   Neurological:      General: No focal deficit present.      Mental Status: She is alert and oriented to person, place, and time. Mental status is at baseline.   Psychiatric:         Mood and Affect: Mood normal.         Behavior: Behavior normal.         Judgment: Judgment normal.         Assessment:       1. Type II diabetes mellitus with neurological manifestations    2. Diabetic peripheral neuropathy associated with type 2 diabetes mellitus    3. Mixed hyperlipidemia    4. B12 deficiency    5. Dependent edema    6. Chronic right shoulder pain    7. Insomnia, unspecified type    8. Arteriosclerosis of coronary artery    9. Bigeminy    10. Abnormal EKG    11. Arthralgia, unspecified joint    12. Depression, unspecified depression type    13. Chronic right hip pain    14. Unsteady gait    15. Stage 3a chronic kidney disease        Plan:       Type II diabetes mellitus with neurological manifestations  -     atorvastatin (LIPITOR) 40 MG tablet; Take 1 tablet (40 mg total) by mouth every evening. For cholesterol  Dispense: 90 tablet; Refill: 3  -     metFORMIN (GLUCOPHAGE) 500 MG tablet; Take 1 tablet (500 mg total) by mouth daily with dinner or evening meal. For diabetes  Dispense: 90 tablet; Refill: 3  -      potassium chloride (KLOR-CON) 8 MEQ TbSR; Take 1 tablet (8 mEq total) by mouth 2 (two) times daily as needed (with Lasix for fluid).  Dispense: 180 tablet; Refill: 3  -     SITagliptin phosphate (JANUVIA) 100 MG Tab; Take 1 tablet (100 mg total) by mouth once daily. For diabetes  Dispense: 90 tablet; Refill: 3    Diabetic peripheral neuropathy associated with type 2 diabetes mellitus  -     cyanocobalamin injection 1,000 mcg  -     gabapentin (NEURONTIN) 300 MG capsule; Take 1 capsule (300 mg total) by mouth every evening. For nerve pain  Dispense: 90 capsule; Refill: 3  -     potassium chloride (KLOR-CON) 8 MEQ TbSR; Take 1 tablet (8 mEq total) by mouth 2 (two) times daily as needed (with Lasix for fluid).  Dispense: 180 tablet; Refill: 3    Mixed hyperlipidemia  -     atorvastatin (LIPITOR) 40 MG tablet; Take 1 tablet (40 mg total) by mouth every evening. For cholesterol  Dispense: 90 tablet; Refill: 3    B12 deficiency  -     cyanocobalamin injection 1,000 mcg    Dependent edema  -     furosemide (LASIX) 20 MG tablet; Take 1-2 tablets daily if needed for swelling.  Dispense: 180 tablet; Refill: 3  -     potassium chloride (KLOR-CON) 8 MEQ TbSR; Take 1 tablet (8 mEq total) by mouth 2 (two) times daily as needed (with Lasix for fluid).  Dispense: 180 tablet; Refill: 3    Chronic right shoulder pain  -     LIDOcaine (LIDODERM) 5 %; Place 1 patch onto the skin once daily.  Dispense: 90 patch; Refill: 3    Insomnia, unspecified type  -     traZODone (DESYREL) 50 MG tablet; Take 1 tablet (50 mg total) by mouth nightly as needed for Insomnia.  Dispense: 90 tablet; Refill: 3    Arteriosclerosis of coronary artery  -     Ambulatory referral/consult to Cardiology; Future; Expected date: 10/25/2023  -     omega-3 fatty acids-fish oil 340-1,000 mg Cap; Take 1 capsule by mouth 2 (two) times daily. For cholesterol and heart  Dispense: 180 capsule; Refill: 3  -     potassium chloride (KLOR-CON) 8 MEQ TbSR; Take 1 tablet (8  mEq total) by mouth 2 (two) times daily as needed (with Lasix for fluid).  Dispense: 180 tablet; Refill: 3    Bigeminy  Comments:  9-18-23 on EKG  Patient desires to see Dr. Medina (desires to change from Dr. Bess)  Orders:  -     Ambulatory referral/consult to Cardiology; Future; Expected date: 10/25/2023    Abnormal EKG  Comments:  She plans on seeing Dr. Medina  Orders:  -     Ambulatory referral/consult to Cardiology; Future; Expected date: 10/25/2023    Arthralgia, unspecified joint  -     diclofenac sodium (VOLTAREN) 1 % Gel; Apply 4 g topically 4 (four) times daily as needed (arthritis pain).  Dispense: 450 g; Refill: 3    Depression, unspecified depression type  -     EScitalopram oxalate (LEXAPRO) 20 MG tablet; Take 1 tablet (20 mg total) by mouth once daily.  Dispense: 90 each; Refill: 3  -     traZODone (DESYREL) 50 MG tablet; Take 1 tablet (50 mg total) by mouth nightly as needed for Insomnia.  Dispense: 90 tablet; Refill: 3    Chronic right hip pain  -     LIDOcaine (LIDODERM) 5 %; Place 1 patch onto the skin once daily.  Dispense: 90 patch; Refill: 3    Unsteady gait    Stage 3a chronic kidney disease    Recent labs resulted in [facility] were reviewed in detail with patient and all questions answered to his/her satisfaction.          Previous records in Epic were reviewed, including the last 3 months of encounters, imaging, laboratory, and pathology reports.    Strict return precautions reviewed and patient verbalized understanding. Risks, benefits, and alternatives to the plan were reviewed in detail and all questions answered to the patient's satisfaction. Patient agrees to return to clinic or ER if symptoms worsen. 40 minutes total were spent on today's visit, not limited to but including time based on counseling and coordination of care.    Patient instructed that best way to communicate with my office staff is for patient to get on the [facility] epic patient portal to expedite communication and  communication issues that may occur.  Patient was given instructions on how to get on the portal.  I encouraged patient to obtain portal access as well.  Ultimately it is up to the patient to obtain access.  Patient voiced understanding.    This note was created using [employer] voice recognition software that occasionally may misinterpret phrases or words.    Follow up in about 3 months (around 1/18/2024) for f/u DM.

## 2023-11-02 PROBLEM — L97.512 ULCER OF RIGHT FOOT WITH FAT LAYER EXPOSED: Status: RESOLVED | Noted: 2022-08-16 | Resolved: 2023-11-02

## 2023-11-02 PROBLEM — M25.50 ARTHRALGIA: Status: ACTIVE | Noted: 2023-11-02

## 2023-11-02 PROBLEM — M48.00 SPINAL STENOSIS: Status: ACTIVE | Noted: 2023-11-02

## 2023-11-02 PROBLEM — E78.00 HYPERCHOLESTEREMIA: Status: ACTIVE | Noted: 2023-05-08

## 2023-11-02 PROBLEM — F32.A DEPRESSION: Status: ACTIVE | Noted: 2023-11-02

## 2023-11-02 PROBLEM — R09.89 BILATERAL CAROTID BRUITS: Status: ACTIVE | Noted: 2023-11-02

## 2023-11-02 PROBLEM — E53.8 B12 DEFICIENCY: Status: ACTIVE | Noted: 2023-11-02

## 2023-11-02 PROBLEM — R60.9 DEPENDENT EDEMA: Status: ACTIVE | Noted: 2023-11-02

## 2023-11-14 ENCOUNTER — OFFICE VISIT (OUTPATIENT)
Dept: FAMILY MEDICINE | Facility: CLINIC | Age: 88
End: 2023-11-14
Payer: MEDICARE

## 2023-11-14 DIAGNOSIS — E11.49 TYPE II DIABETES MELLITUS WITH NEUROLOGICAL MANIFESTATIONS: ICD-10-CM

## 2023-11-14 DIAGNOSIS — N90.89 LABIAL LESION: Primary | ICD-10-CM

## 2023-11-14 DIAGNOSIS — G89.29 CHRONIC RIGHT SHOULDER PAIN: ICD-10-CM

## 2023-11-14 DIAGNOSIS — M25.511 CHRONIC RIGHT SHOULDER PAIN: ICD-10-CM

## 2023-11-14 PROCEDURE — 99443 PR PHYSICIAN TELEPHONE EVALUATION 21-30 MIN: CPT | Mod: 95,,, | Performed by: FAMILY MEDICINE

## 2023-11-14 PROCEDURE — 99443 PR PHYSICIAN TELEPHONE EVALUATION 21-30 MIN: ICD-10-PCS | Mod: 95,,, | Performed by: FAMILY MEDICINE

## 2023-11-14 RX ORDER — NYSTATIN AND TRIAMCINOLONE ACETONIDE 100000; 1 [USP'U]/G; MG/G
OINTMENT TOPICAL 2 TIMES DAILY
Qty: 30 G | Refills: 0 | Status: SHIPPED | OUTPATIENT
Start: 2023-11-14 | End: 2024-02-22 | Stop reason: SDUPTHER

## 2023-11-14 NOTE — PROGRESS NOTES
Established Patient - Audio Only Telehealth Visit     The patient location is: [place]  The chief complaint leading to consultation is: labial lesion  Visit type: Virtual visit with audio only (telephone)  Total time spent with patient: 12 min/25 min total on visit       The reason for the audio only service rather than synchronous audio and video virtual visit was related to technical difficulties or patient preference/necessity.     Each patient to whom I provide medical services by telemedicine is:  (1) informed of the relationship between the physician and patient and the respective role of any other health care provider with respect to management of the patient; and (2) notified that they may decline to receive medical services by telemedicine and may withdraw from such care at any time. Patient verbally consented to receive this service via voice-only telephone call.       The patient location is: [place]  The chief complaint leading to consultation is: labial lesion     Visit type: audio only     Face to Face time with patient: 12 min  25 minutes of total time spent on the encounter, which includes face to face time and non-face to face time preparing to see the patient (eg, review of tests), Obtaining and/or reviewing separately obtained history, Documenting clinical information in the electronic or other health record, Independently interpreting results (not separately reported) and communicating results to the patient/family/caregiver, or Care coordination (not separately reported).            Each patient to whom he or she provides medical services by telemedicine is:  (1) informed of the relationship between the physician and patient and the respective role of any other health care provider with respect to management of the patient; and (2) notified that he or she may decline to receive medical services by telemedicine and may withdraw from such care at any time.     Notes:   Subjective:         Subjective  "  Patient ID: Cinthya Cuevas is a 88 y.o. female.     Chief Complaint: Labial lesion     HPI  89yo WF with PMHX DM here today for telephone visit due to labial lesion. "I've had this before, and it got better with some cream." Reports 2-3 day hx of labial lesion "about the size of head of a push pin." Denies itching, vag d/c, dysuria, vag bleeding. Reports the lesion "sticks" to her underclothes and when the clothing is removed, the lesion tends to bleed a little. Reports a little discomfort when this happens. Unable to present to clinic for physical exam due to transportation.    Also c/o R shoulder pain, chronic, worse with the cooler temps.    Review of Systems   Genitourinary:  Positive for genital sores. Negative for decreased urine volume, urgency, vaginal bleeding, vaginal discharge and vaginal pain.   Musculoskeletal:  Positive for arthralgias (R shoulder).   All other systems reviewed and are negative.           Objective:      Objective   Physical Exam  Pulmonary:      Effort: Pulmonary effort is normal. No respiratory distress.   Neurological:      Mental Status: She is alert and oriented to person, place, and time.   Psychiatric:         Mood and Affect: Mood normal.         Behavior: Behavior normal.         Thought Content: Thought content normal.         There were no vitals taken for this visit.There is no height or weight on file to calculate BMI.                 1. Labial lesion    2. Type II diabetes mellitus with neurological manifestations    3.     Chronic R shoulder pain    Recommend in-office visit. She reports difficulty with finding transportation. Can come in the office after Thanksgiving.  Mycolog II ointment sent to local pharmacy ([facility]) who can deliver to her home.  Patient to call to provide update in 5 days; if no improvement, will need in-person visit. She verbalized understanding.    Continue chronic meds for DM, R shoulder pain.     Strict return precautions reviewed and patient " verbalized understanding. Risks, benefits, and alternatives to the plan were reviewed in detail and all questions answered to the patient's satisfaction. Patient agrees to return to clinic or ER if symptoms worsen. 25 minutes total were spent on today's visit, not limited to but including time based on counseling and coordination of care                           This service was not originating from a related E/M service provided within the previous 7 days nor will  to an E/M service or procedure within the next 24 hours or my soonest available appointment.  Prevailing standard of care was able to be met in this audio-only visit.

## 2023-11-14 NOTE — PROGRESS NOTES
The patient location is: [place]  The chief complaint leading to consultation is: labial lesion    Visit type: audio only    Face to Face time with patient: 12 min  20 minutes of total time spent on the encounter, which includes face to face time and non-face to face time preparing to see the patient (eg, review of tests), Obtaining and/or reviewing separately obtained history, Documenting clinical information in the electronic or other health record, Independently interpreting results (not separately reported) and communicating results to the patient/family/caregiver, or Care coordination (not separately reported).         Each patient to whom he or she provides medical services by telemedicine is:  (1) informed of the relationship between the physician and patient and the respective role of any other health care provider with respect to management of the patient; and (2) notified that he or she may decline to receive medical services by telemedicine and may withdraw from such care at any time.    Notes:   Subjective:       Patient ID: Cinthya Cuevas is a 88 y.o. female.    Chief Complaint: No chief complaint on file.    HPI  Review of Systems   Genitourinary:  Positive for genital sores. Negative for decreased urine volume, urgency, vaginal bleeding, vaginal discharge and vaginal pain.   Musculoskeletal:  Positive for arthralgias (R shoulder).   All other systems reviewed and are negative.        Objective:      Physical Exam  Pulmonary:      Effort: Pulmonary effort is normal. No respiratory distress.   Neurological:      Mental Status: She is alert and oriented to person, place, and time.   Psychiatric:         Mood and Affect: Mood normal.         Behavior: Behavior normal.         Thought Content: Thought content normal.       There were no vitals taken for this visit.There is no height or weight on file to calculate BMI.            1. Labial lesion    2. Type II diabetes mellitus with neurological  manifestations      Recommend in-office visit. She reports difficulty with finding transportation. Can come in the office after Thanksgiving.  Mycolog II ointment sent to local pharmacy ([facility]) who can deliver to her home.  Patient to call to provide update in 5 days; if no improvement, will need in-person visit. She verbalized understanding.    Strict return precautions reviewed and patient verbalized understanding. Risks, benefits, and alternatives to the plan were reviewed in detail and all questions answered to the patient's satisfaction. Patient agrees to return to clinic or ER if symptoms worsen. 20 minutes total were spent on today's visit, not limited to but including time based on counseling and coordination of care

## 2023-12-04 ENCOUNTER — PATIENT OUTREACH (OUTPATIENT)
Dept: ADMINISTRATIVE | Facility: HOSPITAL | Age: 88
End: 2023-12-04
Payer: MEDICARE

## 2023-12-04 LAB
LEFT EYE DM RETINOPATHY: NEGATIVE
RIGHT EYE DM RETINOPATHY: NEGATIVE

## 2023-12-18 ENCOUNTER — OFFICE VISIT (OUTPATIENT)
Dept: PODIATRY | Facility: CLINIC | Age: 88
End: 2023-12-18
Payer: MEDICARE

## 2023-12-18 VITALS
HEART RATE: 79 BPM | BODY MASS INDEX: 23.55 KG/M2 | RESPIRATION RATE: 16 BRPM | DIASTOLIC BLOOD PRESSURE: 81 MMHG | HEIGHT: 62 IN | SYSTOLIC BLOOD PRESSURE: 119 MMHG | WEIGHT: 128 LBS

## 2023-12-18 DIAGNOSIS — R60.0 EDEMA OF BOTH LOWER EXTREMITIES: ICD-10-CM

## 2023-12-18 DIAGNOSIS — E11.59 CONTROLLED TYPE 2 DIABETES MELLITUS WITH OTHER CIRCULATORY COMPLICATION, UNSPECIFIED WHETHER LONG TERM INSULIN USE: ICD-10-CM

## 2023-12-18 DIAGNOSIS — M21.621 TAILOR'S BUNION OF RIGHT FOOT: Primary | ICD-10-CM

## 2023-12-18 DIAGNOSIS — L84 PRE-ULCERATIVE CALLUSES: ICD-10-CM

## 2023-12-18 DIAGNOSIS — L53.9 ERYTHEMA OF SKIN: ICD-10-CM

## 2023-12-18 PROCEDURE — 99213 OFFICE O/P EST LOW 20 MIN: CPT | Mod: S$PBB,,, | Performed by: PODIATRIST

## 2023-12-18 PROCEDURE — 99999 PR PBB SHADOW E&M-EST. PATIENT-LVL V: CPT | Mod: PBBFAC,,, | Performed by: PODIATRIST

## 2023-12-18 PROCEDURE — 99213 PR OFFICE/OUTPT VISIT, EST, LEVL III, 20-29 MIN: ICD-10-PCS | Mod: S$PBB,,, | Performed by: PODIATRIST

## 2023-12-18 PROCEDURE — 99215 OFFICE O/P EST HI 40 MIN: CPT | Mod: PBBFAC,PN | Performed by: PODIATRIST

## 2023-12-18 PROCEDURE — 99999 PR PBB SHADOW E&M-EST. PATIENT-LVL V: ICD-10-PCS | Mod: PBBFAC,,, | Performed by: PODIATRIST

## 2023-12-20 ENCOUNTER — TELEPHONE (OUTPATIENT)
Dept: FAMILY MEDICINE | Facility: CLINIC | Age: 88
End: 2023-12-20
Payer: MEDICARE

## 2023-12-20 NOTE — TELEPHONE ENCOUNTER
Alisson Stover MA Rashid, Carlinda, LPN  Caller: Unspecified (Today,  9:44 AM)  Laly, We have not received the order.              Ilene Weir and Ms. Vinson,  Please review message below from Debora/[facility] concerning orders faxed for this patient.   I reviewed chart noted previous orders in chart from [facility].  Call placed to [facility] due to msg left, spoke w/Debora states faxed an order to be signed for continual care for this pt but have not received signed orders. Checking to see if order was ok? Informed Debora will send msg to Dr. Weir   Please review and advise.  Signed:  Llay Willson LPN    ----- Message from Stephania Collazo sent at 12/20/2023  9:38 AM CST -----  Regarding: advice  Contact: Jeanette Palafox  Type: Needs Medical Advice  Who Called:  Jeanette Palafox  Symptoms (please be specific):    How long has patient had these symptoms:    Pharmacy name and phone #:    Best Call Back Number: [phone]  Additional Information: Jeanette states she needs an orders signed.  Please call Jeanette to advise.  Thanks!

## 2023-12-20 NOTE — PROGRESS NOTES
Subjective:       Patient ID: Cinthya Cuevas is a 88 y.o. female.    Chief Complaint: Follow-up, Callouses, Foot Swelling, Diabetes Mellitus, and Foot Pain  Patient with diabetes presents for follow-up pain on the side of the bone right 5th digit  due to tailor's bunion and edema.  Patient relates some improvement in the area on the side of the foot, does apply lotion and try to smooth it on a regular basis.  Avoids shoes which cause pressure in this area.  Feels swelling is stable, no improvement but has not progressed.  Feels she is just as active even since she stopped driving.  Goes out almost every day  bus which goes to appointments and shopping from the facility she is living in 3 days a week and she always goes for the exercise.  Has been more unstable with falls on a regular basis.  Recently injured ribs on her right side after a fall in which she did not go to the ER.  Pain level right foot 3/10        Past Medical History:   Diagnosis Date    Anemia     Arthritis     Diabetes mellitus     Encounter for blood transfusion     Impaired mobility     Retinal hemorrhage of right eye 2023    EYE EXAM IN MEDIA    Ulcer of right foot with fat layer exposed 2022     Past Surgical History:   Procedure Laterality Date    APPENDECTOMY      BACK SURGERY      3/2015    CHOLECYSTECTOMY      COLONOSCOPY      EYE SURGERY Bilateral     cataract    FRACTURE SURGERY      right ankle     HYSTERECTOMY      JOINT REPLACEMENT      left hip, right knee     History reviewed. No pertinent family history.  Social History     Socioeconomic History    Marital status:    Tobacco Use    Smoking status: Former     Current packs/day: 0.00     Average packs/day: 0.3 packs/day for 31.0 years (7.8 ttl pk-yrs)     Types: Cigarettes     Start date: 1957     Quit date: 1988     Years since quittin.9    Smokeless tobacco: Never   Substance and Sexual Activity    Alcohol use: No    Drug use: No        Current Outpatient Medications   Medication Sig Dispense Refill    amitriptyline (ELAVIL) 10 MG tablet       atorvastatin (LIPITOR) 40 MG tablet Take 1 tablet (40 mg total) by mouth every evening. For cholesterol 90 tablet 3    diclofenac sodium (VOLTAREN) 1 % Gel Apply 4 g topically 4 (four) times daily as needed (arthritis pain). 450 g 3    EScitalopram oxalate (LEXAPRO) 20 MG tablet Take 1 tablet (20 mg total) by mouth once daily. 90 each 3    FREESTYLE INSULINX Misc       FREESTYLE LANCETS 28 gauge lancets       FREESTYLE LITE STRIPS Strp       furosemide (LASIX) 20 MG tablet Take 1-2 tablets daily if needed for swelling. 180 tablet 3    gabapentin (NEURONTIN) 300 MG capsule Take 1 capsule (300 mg total) by mouth every evening. For nerve pain 90 capsule 3    LIDOcaine (LIDODERM) 5 % Place 1 patch onto the skin once daily. 90 patch 3    metFORMIN (GLUCOPHAGE) 500 MG tablet Take 1 tablet (500 mg total) by mouth daily with dinner or evening meal. For diabetes 90 tablet 3    multivitamin/iron/folic acid (CENTRUM WOMEN ORAL) Take by mouth.      nystatin-triamcinolone (MYCOLOG) ointment Apply topically 2 (two) times daily. 30 g 0    omega-3 fatty acids-fish oil 340-1,000 mg Cap Take 1 capsule by mouth 2 (two) times daily. For cholesterol and heart 180 capsule 3    potassium chloride (KLOR-CON) 8 MEQ TbSR Take 1 tablet (8 mEq total) by mouth 2 (two) times daily as needed (with Lasix for fluid). 180 tablet 3    SITagliptin phosphate (JANUVIA) 100 MG Tab Take 1 tablet (100 mg total) by mouth once daily. For diabetes 90 tablet 3    traZODone (DESYREL) 50 MG tablet Take 1 tablet (50 mg total) by mouth nightly as needed for Insomnia. 90 tablet 3     Current Facility-Administered Medications   Medication Dose Route Frequency Provider Last Rate Last Admin    cyanocobalamin injection 1,000 mcg  1,000 mcg Intramuscular Q30 Days Dariana Martin MD   1,000 mcg at 08/17/23 1426    cyanocobalamin injection 1,000 mcg   "1,000 mcg Intramuscular Once Dariana Martin MD         Review of patient's allergies indicates:  No Known Allergies        Review of Systems   Cardiovascular:  Positive for leg swelling.        L>R   Musculoskeletal:  Positive for gait problem.        Wheelchair today   All other systems reviewed and are negative.      Objective:      Vitals:    12/18/23 0942   BP: 119/81   Pulse: 79   Resp: 16   Weight: 58.1 kg (128 lb)   Height: 5' 2" (1.575 m)     Physical Exam  Vitals and nursing note reviewed.   Constitutional:       General: She is not in acute distress.     Appearance: Normal appearance.   Cardiovascular:      Pulses:           Dorsalis pedis pulses are 1+ on the right side and 1+ on the left side.        Posterior tibial pulses are 0 on the right side and 0 on the left side.   Pulmonary:      Effort: Pulmonary effort is normal.   Musculoskeletal:         General: Tenderness present.      Right foot: Decreased range of motion (Arthritic and degenerative changes bilateral feet). Bunion (mild HAV and tailor's bunion bilateral) present.      Left foot: Decreased range of motion. Bunion present.      Comments: pain lateral 5th metatarsal head, painful lesion/better maintained,  prominent due to tailor's bunion deformity. Limited mobility   Feet:      Right foot:      Skin integrity: Skin breakdown and callus (improved tender preulcerative callus/ hyperkeratotic tissue with  superficial IPK  lateral 5th met head right.  Mild callus plantar medial 1st MPJ right no pain) present.      Toenail Condition: Right toenails are abnormally thick and long.      Left foot:      Skin integrity: No skin breakdown or callus.      Toenail Condition: Left toenails are abnormally thick and long.   Skin:     Capillary Refill: Capillary refill takes 2 to 3 seconds.      Findings: Erythema present.      Comments:  Mild erythema and calor lower medial left leg above the ankle, no weeping or skin break at this time   Neurological: "      General: No focal deficit present.      Mental Status: She is alert.   Psychiatric:         Behavior: Behavior normal.         Thought Content: Thought content normal.                                        Assessment:       1. Tailor's bunion of right foot    2. Pre-ulcerative calluses - Right Foot    3. Controlled type 2 diabetes mellitus with other circulatory complication, unspecified whether long term insulin use    4. Edema of both lower extremities    5. Erythema of skin                Plan:         Reviewed with patient pressure with tailor's bunion, prominent bone,  callus in this area which is improved, better maintained.  Advised patient callus in the center is not as deep, there is no warmth or additional swelling around this area, continue to file, smooth the area with something as long as it is gentle  Continue application of lotion daily continue appropriate shoes to avoid pressure daily  Reviewed potential complications  Debridement of pre ulcerative callus lateral 5th met head,  antibiotic, gauze, fabric bandage applied.  Instructed patient to remove before bed tonight, no further bandage needs to be applied, keep air it out, dry during the day and light moisturizing at night  Contact the office immediately with any changes regarding this locations especially since she has a history of ulcer between the 4th and 5th toe  Debrided callus medial 1st MPJ left, no discoloration  Reviewed care maintenance of skin and nails, nails debrided  We discussed increased swelling left lower leg in area of mild redness warmth in the lower leg.  We reviewed cellulitis, she has had this in the past  Ace wrap applied to the left lower leg, instructed patient to apply ice pack 15-20 minutes to this area over the Ace bandage when she gets home and before bed.  If Ace bandage is comfortable can wear it overnight, apply ice again twice tomorrow over the Ace wrap.  Can remove the Ace wrap before bed tomorrow and  inspect the area to make sure the swelling and redness has resolved, if it has not resolved or progressed and 24 hours she needs to contact her PCP   Highly recommended she go to the emergency room to have her ribs checked on her right side which she injured from a fall or a week ago.  Patient verbalized understanding but said  she most likely  would not go at this time  Reviewed diabetic education and daily foot checks, contact office immediately with any changes or concerns  Patient was in understanding and agreement with treatment plan.  I counseled the patient on their conditions, implications and medical management.  Instructed patient to contact the office with any changes, questions, concerns, worsening of symptoms.   Total face to face time 20 minutes, exam, assessment, treatment, discussion, additional time for review of chart prior to and following appointment and visit documentation, consultation and coordination of care.   Follow up 2 months    This note was created using M*Modal voice recognition software that occasionally misinterpreted phrases or words.

## 2024-01-08 ENCOUNTER — TELEPHONE (OUTPATIENT)
Dept: FAMILY MEDICINE | Facility: CLINIC | Age: 89
End: 2024-01-08
Payer: MEDICARE

## 2024-01-08 DIAGNOSIS — R53.81 DEBILITY: ICD-10-CM

## 2024-01-08 DIAGNOSIS — R53.1 WEAKNESS: ICD-10-CM

## 2024-01-08 DIAGNOSIS — E11.42 DIABETIC PERIPHERAL NEUROPATHY ASSOCIATED WITH TYPE 2 DIABETES MELLITUS: ICD-10-CM

## 2024-01-08 DIAGNOSIS — I73.9 PVD (PERIPHERAL VASCULAR DISEASE): ICD-10-CM

## 2024-01-08 DIAGNOSIS — R29.6 FREQUENT FALLS: Primary | ICD-10-CM

## 2024-01-08 DIAGNOSIS — M48.00 SPINAL STENOSIS, UNSPECIFIED SPINAL REGION: ICD-10-CM

## 2024-01-08 NOTE — TELEPHONE ENCOUNTER
PT order signed, please fax to number requested. Thanks! Document which PT this is with. I ordered as outpatient. Let me know if this needs to be changed to home health PT. Patient is at assisted living.

## 2024-01-08 NOTE — TELEPHONE ENCOUNTER
----- Message from Theo Hunter sent at 1/8/2024 10:11 AM CST -----  Contact: Kenneth  Type: Needs Medical Advice  [facility]: [phone]  Additional Information: Called to get PT order for pt due to fall. States needing strength and stability training. FAX: [phone]

## 2024-01-22 ENCOUNTER — TELEPHONE (OUTPATIENT)
Dept: FAMILY MEDICINE | Facility: CLINIC | Age: 89
End: 2024-01-22
Payer: MEDICARE

## 2024-01-22 NOTE — TELEPHONE ENCOUNTER
----- Message from Stephania Collazo sent at 1/22/2024 10:23 AM CST -----  Regarding: advice  Contact: Debora  Type: Needs Medical Advice  Who Called:  Debora Cm  Symptoms (please be specific):    How long has patient had these symptoms:    Pharmacy name and phone #:    Best Call Back Number: [phone]  Additional Information: Debora states she has an order she needs signed from 11/2023.  Please call Neelam to advise.  Thanks!

## 2024-01-22 NOTE — TELEPHONE ENCOUNTER
Spoke with Yara at [facility] and notified her that she could fax the order to the [facility] for Dr. Back to sign. Verbalized understanding.

## 2024-01-25 ENCOUNTER — DOCUMENT SCAN (OUTPATIENT)
Dept: HOME HEALTH SERVICES | Facility: HOSPITAL | Age: 89
End: 2024-01-25
Payer: MEDICARE

## 2024-01-26 ENCOUNTER — TELEPHONE (OUTPATIENT)
Dept: PODIATRY | Facility: CLINIC | Age: 89
End: 2024-01-26
Payer: MEDICARE

## 2024-01-26 NOTE — TELEPHONE ENCOUNTER
Spoke to pt requesting sooner appointment, explained 2/19/2024 was soonest at [place], offered a sooner appointment in [place] refused d/t no transportation.

## 2024-01-26 NOTE — TELEPHONE ENCOUNTER
----- Message from Erin La sent at 1/26/2024  9:59 AM CST -----  Type:  Sooner Apoointment Request    Caller is requesting a sooner appointment.  Caller declined first available appointment listed below.  Caller will not accept being placed on the waitlist and is requesting a message be sent to doctor.  Name of Caller:the patient  When is the first available appointment?2/19  Symptoms: R foot pain  Would the patient rather a call back or a response via [facility]? call back  Best Call Back Number:[phone]   Additional Information: Thanks

## 2024-02-19 ENCOUNTER — OFFICE VISIT (OUTPATIENT)
Dept: PODIATRY | Facility: CLINIC | Age: 89
End: 2024-02-19
Payer: MEDICARE

## 2024-02-19 VITALS
HEIGHT: 62 IN | WEIGHT: 125 LBS | RESPIRATION RATE: 18 BRPM | SYSTOLIC BLOOD PRESSURE: 128 MMHG | BODY MASS INDEX: 23 KG/M2 | HEART RATE: 82 BPM | DIASTOLIC BLOOD PRESSURE: 72 MMHG

## 2024-02-19 DIAGNOSIS — E11.49 TYPE II DIABETES MELLITUS WITH NEUROLOGICAL MANIFESTATIONS: ICD-10-CM

## 2024-02-19 DIAGNOSIS — F32.A DEPRESSION, UNSPECIFIED DEPRESSION TYPE: ICD-10-CM

## 2024-02-19 DIAGNOSIS — M20.61 ACQUIRED DEFORMITY OF TOE, RIGHT: ICD-10-CM

## 2024-02-19 DIAGNOSIS — R60.0 EDEMA OF BOTH LOWER EXTREMITIES: ICD-10-CM

## 2024-02-19 DIAGNOSIS — L97.511 SKIN ULCER OF FOURTH TOE OF RIGHT FOOT, LIMITED TO BREAKDOWN OF SKIN: Primary | ICD-10-CM

## 2024-02-19 DIAGNOSIS — R60.9 DEPENDENT EDEMA: ICD-10-CM

## 2024-02-19 DIAGNOSIS — L84 PRE-ULCERATIVE CALLUSES: ICD-10-CM

## 2024-02-19 DIAGNOSIS — E11.42 DIABETIC PERIPHERAL NEUROPATHY ASSOCIATED WITH TYPE 2 DIABETES MELLITUS: ICD-10-CM

## 2024-02-19 DIAGNOSIS — I25.10 ARTERIOSCLEROSIS OF CORONARY ARTERY: ICD-10-CM

## 2024-02-19 DIAGNOSIS — E11.59 CONTROLLED TYPE 2 DIABETES MELLITUS WITH OTHER CIRCULATORY COMPLICATION, UNSPECIFIED WHETHER LONG TERM INSULIN USE: ICD-10-CM

## 2024-02-19 DIAGNOSIS — E78.2 MIXED HYPERLIPIDEMIA: ICD-10-CM

## 2024-02-19 DIAGNOSIS — G47.00 INSOMNIA, UNSPECIFIED TYPE: ICD-10-CM

## 2024-02-19 DIAGNOSIS — M21.621 TAILOR'S BUNION OF RIGHT FOOT: ICD-10-CM

## 2024-02-19 PROCEDURE — 99999 PR PBB SHADOW E&M-EST. PATIENT-LVL V: CPT | Mod: PBBFAC,,, | Performed by: PODIATRIST

## 2024-02-19 PROCEDURE — 99215 OFFICE O/P EST HI 40 MIN: CPT | Mod: PBBFAC,PN | Performed by: PODIATRIST

## 2024-02-19 PROCEDURE — 99214 OFFICE O/P EST MOD 30 MIN: CPT | Mod: S$PBB,,, | Performed by: PODIATRIST

## 2024-02-19 RX ORDER — DEXTROMETHORPHAN HBR, GUAIFENESIN 20; 200 MG/10ML; MG/10ML
10 LIQUID ORAL
COMMUNITY
Start: 2024-01-12

## 2024-02-19 RX ORDER — CEPHALEXIN 500 MG/1
500 CAPSULE ORAL 2 TIMES DAILY
COMMUNITY
Start: 2024-01-02 | End: 2024-02-19

## 2024-02-19 NOTE — TELEPHONE ENCOUNTER
Patient requesting her medications be sent back to [place]. [facility]

## 2024-02-19 NOTE — TELEPHONE ENCOUNTER
Care Due:                  Date            Visit Type   Department     Provider  --------------------------------------------------------------------------------                                VIRTUAL      [facility]  Last Visit: 11-      AUDIO ONLY   MEDICINE       Dariana Martin                              EP -                              PRIMARY      [facility]  Next Visit: 05-      CARE ([facility])   MEDICINE       Dariana Martin                                                            Last  Test          Frequency    Reason                     Performed    Due Date  --------------------------------------------------------------------------------    HBA1C.......  6 months...  SITagliptin, metFORMIN...  09- 03-    [facility] Embedded Care Due Messages. Reference number: [id number].   2/19/2024 3:12:53 PM CST

## 2024-02-19 NOTE — PROGRESS NOTES
Subjective:       Patient ID: Cinthya Cuevas is a 89 y.o. female.    Chief Complaint: Wound Check, Toe Pain, Diabetes Mellitus, and Foot Swelling  Patient with diabetes presents for follow-up pain on the side of the bone right 5th digit due to tailor's bunion, callus and edema.   However she states she is developed another ulcer, not on the right 5th digit but on the 4th digit.  1st noted on 01/26/2024.  Left message with nurse for sooner appointment, was offered appointment in the [place] but could not do so secondary to transportation  She has been keeping it dry and applying gauze between the toes.  Pain level 3/10  She files callus on the side of the foot on a regular basis which has helped.  Always wears wide shoes with soft stretchy material, even indoors  Fell in the shower on 1/2.  Received stitches 3 places on her face and head.  Is slowly improving but now requires assistance in the shower.  Goes out a few times a week transportation, tries to stay active but difficult with balance issues        Past Medical History:   Diagnosis Date    Anemia     Arthritis     Diabetes mellitus     Diabetes mellitus, type 2     Encounter for blood transfusion     Impaired mobility     Retinal hemorrhage of right eye 08/09/2023    EYE EXAM IN MEDIA    Ulcer of right foot with fat layer exposed 08/16/2022     Past Surgical History:   Procedure Laterality Date    APPENDECTOMY      BACK SURGERY      3/2015    CHOLECYSTECTOMY      COLONOSCOPY      EYE SURGERY Bilateral     cataract    FRACTURE SURGERY      right ankle 2000    HYSTERECTOMY      JOINT REPLACEMENT      left hip, right knee     History reviewed. No pertinent family history.  Social History     Socioeconomic History    Marital status:    Tobacco Use    Smoking status: Former     Current packs/day: 0.00     Average packs/day: 0.3 packs/day for 31.0 years (7.8 ttl pk-yrs)     Types: Cigarettes     Start date: 11/18/1957     Quit date: 1/1/1988     Years since  quittin.1    Smokeless tobacco: Never   Substance and Sexual Activity    Alcohol use: No    Drug use: No       Current Outpatient Medications   Medication Sig Dispense Refill    amitriptyline (ELAVIL) 10 MG tablet       atorvastatin (LIPITOR) 40 MG tablet Take 1 tablet (40 mg total) by mouth every evening. For cholesterol 90 tablet 3    diclofenac sodium (VOLTAREN) 1 % Gel Apply 4 g topically 4 (four) times daily as needed (arthritis pain). 450 g 3    EScitalopram oxalate (LEXAPRO) 20 MG tablet Take 1 tablet (20 mg total) by mouth once daily. 90 each 3    FREESTYLE INSULINX Misc       FREESTYLE LANCETS 28 gauge lancets       FREESTYLE LITE STRIPS Strp       furosemide (LASIX) 20 MG tablet Take 1-2 tablets daily if needed for swelling. 180 tablet 3    gabapentin (NEURONTIN) 300 MG capsule Take 1 capsule (300 mg total) by mouth every evening. For nerve pain 90 capsule 3    MERRITT-TUSSIN DM  mg/5 mL Liqd Take 10 mLs by mouth.      LIDOcaine (LIDODERM) 5 % Place 1 patch onto the skin once daily. 90 patch 3    metFORMIN (GLUCOPHAGE) 500 MG tablet Take 1 tablet (500 mg total) by mouth daily with dinner or evening meal. For diabetes 90 tablet 3    multivitamin/iron/folic acid (CENTRUM WOMEN ORAL) Take by mouth.      nystatin-triamcinolone (MYCOLOG) ointment Apply topically 2 (two) times daily. 30 g 0    omega-3 fatty acids-fish oil 340-1,000 mg Cap Take 1 capsule by mouth 2 (two) times daily. For cholesterol and heart 180 capsule 3    potassium chloride (KLOR-CON) 8 MEQ TbSR Take 1 tablet (8 mEq total) by mouth 2 (two) times daily as needed (with Lasix for fluid). 180 tablet 3    SITagliptin phosphate (JANUVIA) 100 MG Tab Take 1 tablet (100 mg total) by mouth once daily. For diabetes 90 tablet 3    traZODone (DESYREL) 50 MG tablet Take 1 tablet (50 mg total) by mouth nightly as needed for Insomnia. 90 tablet 3    cephALEXin (KEFLEX) 500 MG capsule Take 500 mg by mouth 2 (two) times daily.       Current  "Facility-Administered Medications   Medication Dose Route Frequency Provider Last Rate Last Admin    cyanocobalamin injection 1,000 mcg  1,000 mcg Intramuscular Q30 Days Dariana Martin MD   1,000 mcg at 08/17/23 1426    cyanocobalamin injection 1,000 mcg  1,000 mcg Intramuscular Once Dariana Martin MD         Review of patient's allergies indicates:  No Known Allergies        Review of Systems   Cardiovascular:  Positive for leg swelling.        L>R   Musculoskeletal:  Positive for gait problem.        Wheelchair today   All other systems reviewed and are negative.      Objective:      Vitals:    02/19/24 1013 02/19/24 1117   BP: (!) (P) 212/60 128/72   BP Location: (P) Right arm    Patient Position: (P) Sitting    Pulse: 82    Resp: 18    Weight: 56.7 kg (125 lb)    Height: 5' 2" (1.575 m)      Physical Exam  Vitals and nursing note reviewed.   Constitutional:       General: She is not in acute distress.     Appearance: Normal appearance.   Cardiovascular:      Pulses:           Dorsalis pedis pulses are 1+ on the right side and 1+ on the left side.        Posterior tibial pulses are 0 on the right side and 0 on the left side.   Pulmonary:      Effort: Pulmonary effort is normal.   Musculoskeletal:         General: Tenderness present.      Right foot: Decreased range of motion (Arthritic and degenerative changes bilateral feet). Bunion (mild HAV and tailor's bunion with adductovarus deformity 5th digit bilateral) present.      Left foot: Decreased range of motion. Bunion present.      Comments: Pain right foot. Limited mobility   Feet:      Right foot:      Skin integrity: Ulcer (Oval-shaped ulcer approximally 1 x 0.5 cm lateral 4th digit right foot limited to the very surface of the skin, similar to blister, mildly moist, no evidence of infection currently) and callus (Well maintained mildly tender preulcerative callus lateral 5th met head right) present.      Left foot:      Skin integrity: No skin " breakdown or callus.   Skin:     Capillary Refill: Capillary refill takes 2 to 3 seconds.      Comments:  Mild erythema and calor lower medial left leg above the ankle, no weeping or skin break at this time   Neurological:      General: No focal deficit present.      Mental Status: She is alert.   Psychiatric:         Behavior: Behavior normal.         Thought Content: Thought content normal.                        Assessment:       1. Skin ulcer of fourth toe of right foot, limited to breakdown of skin    2. Tailor's bunion of right foot    3. Pre-ulcerative calluses - Right Foot    4. Acquired deformity of toe, right    5. Edema of both lower extremities    6. Controlled type 2 diabetes mellitus with other circulatory complication, unspecified whether long term insulin use            Plan:           Due to previous ulcer on the medial 5th digit right foot in the past patient is aware area needs to be kept clean and dry  Advised patient she is at high risk for infection  Advised patient ulcers in this area recur due to rotation of the 5th toe, pressure on the 5th metatarsal head and chronic callus in this area along with her foot type and structure, daily walking, compensating and swelling of the foot  Reviewed signs of infection to monitor for    Wound care  Ulcer 4th digit cleansed with wound , iodine, silver alginate applied  Samples dispensed and instructed patient to have nurse at the facility where she lives apply same dressing daily and if possible change the dressing before bed and apply iodine a 2nd time  Explained to patient this areas very superficial, as long as it stays dry it should heal quickly  Avoid getting right foot wet in the shower  Pointed out to patient indent between 4th and 5th digit on the left foot puts her at high risk for developing a sore in the same location   2 x 2 gauze was applied in this location and encouraged patient to apply daily to keep the toe   Contact  office immediately with any changes  Reviewed chronic pressure on the side of the 5th met head due to tailor's bunion deformity  Patient understands swelling of her foot contributes to this area  Advised patient swelling in the right foot and lower leg is much improved today  Still has significant swelling of the left lower extremity  Instructed patient monitor for any areas of redness, warmth or weeping  Debridement of pre ulcerative callus lateral 5th met head, stable at this time, no discoloration, no skin opening   Reviewed care maintenance of skin  Reviewed diabetic education  Patient was in understanding and agreement with treatment plan.  I counseled the patient on their conditions, implications and medical management.  Instructed patient to contact the office with any changes, questions, concerns, worsening of symptoms.   Total face to face time 30 minutes, exam, assessment, treatment, discussion, additional time for review of chart prior to and following appointment and visit documentation, consultation and coordination of care.   Follow up 2 days to consider application of gentian violet of iodine is not keeping the area dry    This note was created using M*[employer] voice recognition software that occasionally misinterpreted phrases or words.

## 2024-02-19 NOTE — TELEPHONE ENCOUNTER
----- Message from Haley Ruiz sent at 2/19/2024  2:56 PM CST -----  Type: Needs Medical Advice  Who Called:  patient  Best Call Back Number: [phone] (home)   Additional Information: patient requesting a call back regarding medications - they need to be changed

## 2024-02-20 RX ORDER — TRAZODONE HYDROCHLORIDE 50 MG/1
50 TABLET ORAL NIGHTLY PRN
Qty: 90 TABLET | Refills: 3 | Status: SHIPPED | OUTPATIENT
Start: 2024-02-20 | End: 2024-02-22 | Stop reason: SDUPTHER

## 2024-02-20 RX ORDER — GABAPENTIN 300 MG/1
300 CAPSULE ORAL NIGHTLY
Qty: 90 CAPSULE | Refills: 3 | Status: SHIPPED | OUTPATIENT
Start: 2024-02-20 | End: 2024-02-22 | Stop reason: SDUPTHER

## 2024-02-20 RX ORDER — ESCITALOPRAM OXALATE 20 MG/1
20 TABLET ORAL DAILY
Qty: 90 TABLET | Refills: 3 | Status: SHIPPED | OUTPATIENT
Start: 2024-02-20 | End: 2024-02-22 | Stop reason: SDUPTHER

## 2024-02-20 RX ORDER — ATORVASTATIN CALCIUM 40 MG/1
40 TABLET, FILM COATED ORAL NIGHTLY
Qty: 90 TABLET | Refills: 3 | Status: SHIPPED | OUTPATIENT
Start: 2024-02-20 | End: 2024-02-22 | Stop reason: SDUPTHER

## 2024-02-20 RX ORDER — POTASSIUM CHLORIDE 600 MG/1
8 TABLET, FILM COATED, EXTENDED RELEASE ORAL 2 TIMES DAILY PRN
Qty: 180 TABLET | Refills: 3 | Status: SHIPPED | OUTPATIENT
Start: 2024-02-20 | End: 2024-02-22 | Stop reason: SDUPTHER

## 2024-02-20 RX ORDER — METFORMIN HYDROCHLORIDE 500 MG/1
500 TABLET ORAL
Qty: 90 TABLET | Refills: 3 | Status: SHIPPED | OUTPATIENT
Start: 2024-02-20 | End: 2024-02-22 | Stop reason: SDUPTHER

## 2024-02-20 RX ORDER — FUROSEMIDE 20 MG/1
TABLET ORAL
Qty: 180 TABLET | Refills: 3 | Status: SHIPPED | OUTPATIENT
Start: 2024-02-20 | End: 2024-02-22 | Stop reason: SDUPTHER

## 2024-02-20 RX ORDER — AMITRIPTYLINE HYDROCHLORIDE 10 MG/1
10 TABLET, FILM COATED ORAL NIGHTLY PRN
Qty: 30 TABLET | Refills: 2 | Status: SHIPPED | OUTPATIENT
Start: 2024-02-20 | End: 2024-02-22 | Stop reason: SDUPTHER

## 2024-02-21 ENCOUNTER — OFFICE VISIT (OUTPATIENT)
Dept: PODIATRY | Facility: CLINIC | Age: 89
End: 2024-02-21
Payer: MEDICARE

## 2024-02-21 VITALS
SYSTOLIC BLOOD PRESSURE: 99 MMHG | HEART RATE: 69 BPM | BODY MASS INDEX: 22.86 KG/M2 | WEIGHT: 125 LBS | DIASTOLIC BLOOD PRESSURE: 57 MMHG

## 2024-02-21 DIAGNOSIS — L97.511 SKIN ULCER OF FOURTH TOE OF RIGHT FOOT, LIMITED TO BREAKDOWN OF SKIN: Primary | ICD-10-CM

## 2024-02-21 DIAGNOSIS — M21.621 TAILOR'S BUNION OF RIGHT FOOT: ICD-10-CM

## 2024-02-21 DIAGNOSIS — M20.61 ACQUIRED DEFORMITY OF TOE, RIGHT: ICD-10-CM

## 2024-02-21 PROCEDURE — 99999 PR PBB SHADOW E&M-EST. PATIENT-LVL IV: CPT | Mod: PBBFAC,,, | Performed by: PODIATRIST

## 2024-02-21 PROCEDURE — 99214 OFFICE O/P EST MOD 30 MIN: CPT | Mod: PBBFAC,PN | Performed by: PODIATRIST

## 2024-02-21 PROCEDURE — 99213 OFFICE O/P EST LOW 20 MIN: CPT | Mod: S$PBB,,, | Performed by: PODIATRIST

## 2024-02-22 DIAGNOSIS — G47.00 INSOMNIA, UNSPECIFIED TYPE: ICD-10-CM

## 2024-02-22 DIAGNOSIS — R60.9 DEPENDENT EDEMA: ICD-10-CM

## 2024-02-22 DIAGNOSIS — G89.29 CHRONIC RIGHT SHOULDER PAIN: ICD-10-CM

## 2024-02-22 DIAGNOSIS — E11.42 DIABETIC PERIPHERAL NEUROPATHY ASSOCIATED WITH TYPE 2 DIABETES MELLITUS: ICD-10-CM

## 2024-02-22 DIAGNOSIS — M25.50 ARTHRALGIA, UNSPECIFIED JOINT: ICD-10-CM

## 2024-02-22 DIAGNOSIS — M25.511 CHRONIC RIGHT SHOULDER PAIN: ICD-10-CM

## 2024-02-22 DIAGNOSIS — F32.A DEPRESSION, UNSPECIFIED DEPRESSION TYPE: ICD-10-CM

## 2024-02-22 DIAGNOSIS — E11.49 TYPE II DIABETES MELLITUS WITH NEUROLOGICAL MANIFESTATIONS: ICD-10-CM

## 2024-02-22 DIAGNOSIS — G89.29 CHRONIC RIGHT HIP PAIN: ICD-10-CM

## 2024-02-22 DIAGNOSIS — M25.551 CHRONIC RIGHT HIP PAIN: ICD-10-CM

## 2024-02-22 DIAGNOSIS — E78.2 MIXED HYPERLIPIDEMIA: ICD-10-CM

## 2024-02-22 DIAGNOSIS — I25.10 ARTERIOSCLEROSIS OF CORONARY ARTERY: ICD-10-CM

## 2024-02-22 RX ORDER — LIDOCAINE 50 MG/G
1 PATCH TOPICAL DAILY
Qty: 90 PATCH | Refills: 3 | Status: SHIPPED | OUTPATIENT
Start: 2024-02-22

## 2024-02-22 RX ORDER — MULTIVITAMIN/IRON/FOLIC ACID 18MG-0.4MG
1 TABLET ORAL DAILY
Qty: 90 TABLET | Refills: 3 | Status: SHIPPED | OUTPATIENT
Start: 2024-02-22

## 2024-02-22 RX ORDER — METFORMIN HYDROCHLORIDE 500 MG/1
500 TABLET ORAL
Qty: 90 TABLET | Refills: 3 | Status: SHIPPED | OUTPATIENT
Start: 2024-02-22 | End: 2025-02-15

## 2024-02-22 RX ORDER — NYSTATIN AND TRIAMCINOLONE ACETONIDE 100000; 1 [USP'U]/G; MG/G
OINTMENT TOPICAL 2 TIMES DAILY PRN
Qty: 30 G | Refills: 2 | Status: SHIPPED | OUTPATIENT
Start: 2024-02-22

## 2024-02-22 RX ORDER — POTASSIUM CHLORIDE 600 MG/1
8 TABLET, FILM COATED, EXTENDED RELEASE ORAL 2 TIMES DAILY PRN
Qty: 180 TABLET | Refills: 3 | Status: SHIPPED | OUTPATIENT
Start: 2024-02-22

## 2024-02-22 RX ORDER — ESCITALOPRAM OXALATE 20 MG/1
20 TABLET ORAL DAILY
Qty: 90 TABLET | Refills: 3 | Status: SHIPPED | OUTPATIENT
Start: 2024-02-22 | End: 2025-02-21

## 2024-02-22 RX ORDER — ATORVASTATIN CALCIUM 40 MG/1
40 TABLET, FILM COATED ORAL NIGHTLY
Qty: 90 TABLET | Refills: 3 | Status: SHIPPED | OUTPATIENT
Start: 2024-02-22

## 2024-02-22 RX ORDER — GABAPENTIN 300 MG/1
300 CAPSULE ORAL NIGHTLY
Qty: 90 CAPSULE | Refills: 3 | Status: SHIPPED | OUTPATIENT
Start: 2024-02-22 | End: 2025-02-20

## 2024-02-22 RX ORDER — AMITRIPTYLINE HYDROCHLORIDE 10 MG/1
10 TABLET, FILM COATED ORAL NIGHTLY PRN
Qty: 90 TABLET | Refills: 3 | Status: SHIPPED | OUTPATIENT
Start: 2024-02-22

## 2024-02-22 RX ORDER — TRAZODONE HYDROCHLORIDE 50 MG/1
50 TABLET ORAL NIGHTLY PRN
Qty: 90 TABLET | Refills: 3 | Status: SHIPPED | OUTPATIENT
Start: 2024-02-22 | End: 2025-02-20

## 2024-02-22 RX ORDER — DICLOFENAC SODIUM 10 MG/G
4 GEL TOPICAL 4 TIMES DAILY PRN
Qty: 450 G | Refills: 3 | Status: SHIPPED | OUTPATIENT
Start: 2024-02-22

## 2024-02-22 RX ORDER — FUROSEMIDE 20 MG/1
TABLET ORAL
Qty: 180 TABLET | Refills: 3 | Status: SHIPPED | OUTPATIENT
Start: 2024-02-22

## 2024-02-22 NOTE — PROGRESS NOTES
Subjective:       Patient ID: Cinthya Cuevas is a 89 y.o. female.    Chief Complaint: Follow-up, Foot Ulcer, Diabetes Mellitus, and Wound Check  Patient with diabetes presents for follow-up ulcer lateral 4th digit left foot.  Patient relates she has been applying iodine/Betadine and 1/2 of a 2 x 2 gauze between the toes.  Relates it feels pretty good today with no pain  She currently requiring help in the shower as she fell in the shower a few weeks ago and required stitches in 2 places on her face in her head.  Therefore she states she is only getting in the shower about 3 times a week.  Overall she feels this area is not really getting very wet when she showers.  Confirms it is dried off very well afterwards.  No pain in her feet today        Past Medical History:   Diagnosis Date    Anemia     Arthritis     Diabetes mellitus     Diabetes mellitus, type 2     Encounter for blood transfusion     Impaired mobility     Retinal hemorrhage of right eye 2023    EYE EXAM IN MEDIA    Ulcer of right foot with fat layer exposed 2022     Past Surgical History:   Procedure Laterality Date    APPENDECTOMY      BACK SURGERY      3/2015    CHOLECYSTECTOMY      COLONOSCOPY      EYE SURGERY Bilateral     cataract    FRACTURE SURGERY      right ankle     HYSTERECTOMY      JOINT REPLACEMENT      left hip, right knee     History reviewed. No pertinent family history.  Social History     Socioeconomic History    Marital status:    Tobacco Use    Smoking status: Former     Current packs/day: 0.00     Average packs/day: 0.3 packs/day for 31.0 years (7.8 ttl pk-yrs)     Types: Cigarettes     Start date: 1957     Quit date: 1988     Years since quittin.1    Smokeless tobacco: Never   Substance and Sexual Activity    Alcohol use: No    Drug use: No       Current Outpatient Medications   Medication Sig Dispense Refill    amitriptyline (ELAVIL) 10 MG tablet Take 1 tablet (10 mg total) by mouth  nightly as needed for Insomnia. 30 tablet 2    atorvastatin (LIPITOR) 40 MG tablet Take 1 tablet (40 mg total) by mouth every evening. For cholesterol 90 tablet 3    diclofenac sodium (VOLTAREN) 1 % Gel Apply 4 g topically 4 (four) times daily as needed (arthritis pain). 450 g 3    EScitalopram oxalate (LEXAPRO) 20 MG tablet Take 1 tablet (20 mg total) by mouth once daily. 90 tablet 3    FREESTYLE INSULINX Misc       FREESTYLE LANCETS 28 gauge lancets       FREESTYLE LITE STRIPS Strp       furosemide (LASIX) 20 MG tablet Take 1-2 tablets daily if needed for swelling. 180 tablet 3    gabapentin (NEURONTIN) 300 MG capsule Take 1 capsule (300 mg total) by mouth every evening. For nerve pain 90 capsule 3    MERRITT-TUSSIN DM  mg/5 mL Liqd Take 10 mLs by mouth.      LIDOcaine (LIDODERM) 5 % Place 1 patch onto the skin once daily. 90 patch 3    metFORMIN (GLUCOPHAGE) 500 MG tablet Take 1 tablet (500 mg total) by mouth daily with dinner or evening meal. For diabetes 90 tablet 3    multivitamin/iron/folic acid (CENTRUM WOMEN ORAL) Take by mouth.      nystatin-triamcinolone (MYCOLOG) ointment Apply topically 2 (two) times daily. 30 g 0    omega-3 fatty acids-fish oil 340-1,000 mg Cap Take 1 capsule by mouth 2 (two) times daily. For cholesterol and heart 180 capsule 3    potassium chloride (KLOR-CON) 8 MEQ TbSR Take 1 tablet (8 mEq total) by mouth 2 (two) times daily as needed (with Lasix for fluid). 180 tablet 3    SITagliptin phosphate (JANUVIA) 100 MG Tab Take 1 tablet (100 mg total) by mouth once daily. For diabetes 90 tablet 3    traZODone (DESYREL) 50 MG tablet Take 1 tablet (50 mg total) by mouth nightly as needed for Insomnia. 90 tablet 3     Current Facility-Administered Medications   Medication Dose Route Frequency Provider Last Rate Last Admin    cyanocobalamin injection 1,000 mcg  1,000 mcg Intramuscular Q30 Days Dariana Martin MD   1,000 mcg at 08/17/23 1426    cyanocobalamin injection 1,000 mcg  1,000  mcg Intramuscular Once Dariana Martin MD         Review of patient's allergies indicates:  No Known Allergies        Review of Systems   Cardiovascular:  Positive for leg swelling.        L>R   Musculoskeletal:  Positive for gait problem.        Wheelchair today   All other systems reviewed and are negative.      Objective:      Vitals:    02/21/24 1011   BP: (!) 99/57   Pulse: 69   Weight: 56.7 kg (125 lb)     Physical Exam  Vitals and nursing note reviewed.   Constitutional:       General: She is not in acute distress.  Cardiovascular:      Pulses:           Dorsalis pedis pulses are 1+ on the right side and 1+ on the left side.        Posterior tibial pulses are 0 on the right side and 0 on the left side.   Pulmonary:      Effort: Pulmonary effort is normal.   Musculoskeletal:         General: Tenderness present.      Right foot: Decreased range of motion (Arthritic and degenerative changes bilateral feet). Bunion (mild HAV and tailor's bunion with adductovarus deformity 5th digit bilateral) present.      Left foot: Decreased range of motion. Bunion present.      Comments: Pain right foot. Limited mobility   Feet:      Right foot:      Skin integrity: Ulcer (Oval-shaped ulcer approximally 1 x 0.5 cm lateral 4th digit right foot limited to the surface of the skin, evidence of iodine/Betadine) and callus present.      Left foot:      Skin integrity: No skin breakdown or callus.   Skin:     Capillary Refill: Capillary refill takes 2 to 3 seconds.      Comments:  Mild erythema and calor lower medial left leg above the ankle, no weeping or skin break at this time   Neurological:      General: No focal deficit present.            Assessment:       1. Skin ulcer of fourth toe of right foot, limited to breakdown of skin    2. Tailor's bunion of right foot    3. Acquired deformity of toe, right            Plan:         Patient is at high risk for complications due to previous deep ulcer on the medial 5th digit on  this same foot  Explained to patient it is crucial to keep this area dry  The length of time wound was present on the 5th digit advised patient we are going to try a more aggressive drying agent for daily wound care    Wound care  Ulcer 4th digit cleansed with wound , gentian violet applied with sterile cotton tip swab  Showed patient how to apply directly to the wound.  She is able to visualize this area better than the previous ulcer on the 5th toe  After applying keep the toes apart to allow it to dry thoroughly and continue to apply her gauze between the toes.  At night make sure it stays clean and dry.  Can apply gentian violet to any area that is open or moist daily.  Explained if it is completely dry it does not need application of gentian violet that day.  However it does need the gauze changed at least once, preferably twice each day  Continue with wide comfortable shoes  Advised patient it is crucial she continue to keep the swelling down in her foot, when it gets swollen it puts a lot more pressure on these toes  Contact office immediately with any change  Sample gentian violet dispensed  Reviewed diabetic education  Patient was in understanding and agreement with treatment plan.  I counseled the patient on their conditions, implications and medical management.  Instructed patient to contact the office with any changes, questions, concerns, worsening of symptoms.   Total face to face time 20 minutes, exam, assessment, treatment, discussion, additional time for review of chart prior to and following appointment and visit documentation, consultation and coordination of care.   Follow up 2 weeks    This note was created using [employer] voice recognition software that occasionally misinterpreted phrases or words.

## 2024-02-22 NOTE — TELEPHONE ENCOUNTER
Ilene Ochoa,   Please review message below from this patient.  Call placed to pt due to msg left, spoke w/pt states calling due to Right shoulder that has been ongoing. Requesting a sooner appt w/Dr. Ochoa. Sched appt 3/13/2024 @ 1120 AM.   Thank you.  Signed:  Laly Willson LPN    ----- Message from Kassie Kee, Patient Care Assistant sent at 2/22/2024  3:25 PM CST -----  Regarding: advice  Contact: pt  Type: Needs Medical Advice    Who Called:  pt     Symptoms (please be specific):  shoulder pain     How long has patient had these symptoms:  1 day     Best Call Back Number: [phone] (home)     Additional Information: pt states she would like a callback regarding her medications. Please call to advise. Thanks!

## 2024-02-22 NOTE — TELEPHONE ENCOUNTER
Ilene Ochoa,  Please review this message from this patient regarding all Rx's being sent to [facility].  Patient requested to speak with you on this matter.  Last office visit: 11/14/2023  Thank you.  Signed:  Laly Willson LPN   ----- Message from Stephania Collazo sent at 2/22/2024  3:32 PM CST -----  Regarding: advice  Contact: patient  Type: Needs Medical Advice  Who Called:  patient  Symptoms (please be specific):    How long has patient had these symptoms:    Pharmacy name and phone #:      [facility] - [place], [place] - [address]  [address]  [place] [address]  Phone: [phone] Fax: [phone]      Best Call Back Number: [phone] (home)     Additional Information: Patient states she would like all of her prescriptions [facility].  Please call patient to advise.  Thanks!

## 2024-02-26 ENCOUNTER — TELEPHONE (OUTPATIENT)
Dept: FAMILY MEDICINE | Facility: CLINIC | Age: 89
End: 2024-02-26
Payer: MEDICARE

## 2024-02-26 NOTE — TELEPHONE ENCOUNTER
----- Message from Shaka Salcedo sent at 2/26/2024  9:46 AM CST -----  Regarding: Earlier Appointment  Patient came into the clinic wanting to get an earlier appointment due to some issues that have them concerned. They want to see their PCP but they are currently out of the office. They would like to schedule an apopointment with another provider to discuss these issues. They are requesting Friday, March 1st, 2024 at 11:00am with . They can only do Mondays, Wednesdays and Fridays. If something comes up sooner on those days they would like to receive a call.

## 2024-05-13 ENCOUNTER — TELEPHONE (OUTPATIENT)
Dept: FAMILY MEDICINE | Facility: CLINIC | Age: 89
End: 2024-05-13
Payer: MEDICARE

## 2024-05-13 NOTE — TELEPHONE ENCOUNTER
----- Message from Madisyn Tadeo sent at 5/13/2024 12:06 PM CDT -----  Contact: PT  Type: Needs Medical Advice    Who Called: PT  Best Call Back Number: [phone]  Additional  Information: PT would like  a call back to be explained why her appt on 5/22 was canceled without her knowledge. PT states she did not cancel anything  Please Advise- Thank you

## 2024-05-22 ENCOUNTER — OFFICE VISIT (OUTPATIENT)
Dept: PODIATRY | Facility: CLINIC | Age: 89
End: 2024-05-22
Payer: MEDICARE

## 2024-05-22 VITALS — HEIGHT: 62 IN | WEIGHT: 125 LBS | BODY MASS INDEX: 23 KG/M2

## 2024-05-22 DIAGNOSIS — L60.9 DISEASE OF NAIL: ICD-10-CM

## 2024-05-22 DIAGNOSIS — E11.49 TYPE II DIABETES MELLITUS WITH NEUROLOGICAL MANIFESTATIONS: ICD-10-CM

## 2024-05-22 DIAGNOSIS — L84 PRE-ULCERATIVE CALLUSES: ICD-10-CM

## 2024-05-22 DIAGNOSIS — M21.621 TAILOR'S BUNIONETTE, RIGHT: Primary | ICD-10-CM

## 2024-05-22 PROCEDURE — 99213 OFFICE O/P EST LOW 20 MIN: CPT | Mod: 25,S$GLB,, | Performed by: PODIATRIST

## 2024-05-22 PROCEDURE — 11055 PARING/CUTG B9 HYPRKER LES 1: CPT | Mod: Q9,S$GLB,, | Performed by: PODIATRIST

## 2024-05-22 PROCEDURE — 11721 DEBRIDE NAIL 6 OR MORE: CPT | Mod: Q9,59,S$GLB, | Performed by: PODIATRIST

## 2024-06-17 NOTE — PROGRESS NOTES
Subjective:     Patient ID: Cinthya Cuevas is a 89 y.o. female.    Chief Complaint: Nail Care    Cinthya is a 89 y.o. female who presents to the clinic for evaluation and treatment of high risk feet. Cinthya has a past medical history of Anemia, Arthritis, Diabetes mellitus, Diabetes mellitus, type 2, Encounter for blood transfusion, Impaired mobility, Retinal hemorrhage of right eye (08/09/2023), and Ulcer of right foot with fat layer exposed (08/16/2022). The patient's chief complaint is long, thick toenails. This patient has documented high risk feet requiring routine maintenance secondary to diabetes mellitis and those secondary complications of diabetes, as mentioned..    PCP: Dariana Martin MD    Date Last Seen by PCP: 03/21/2024    Current shoe gear:  Affected Foot: Casual shoes     Unaffected Foot: Casual shoes    Hemoglobin A1C   Date Value Ref Range Status   03/05/2024 5.9 (H) <5.7 % Final     Comment:       Prediabetic: 5.7 - 6.4 %  Diabetic: > 6.4 %   09/18/2023 5.8 (H) 4.0 - 5.6 % Final     Comment:     ADA Screening Guidelines:  5.7-6.4%  Consistent with prediabetes  >or=6.5%  Consistent with diabetes    High levels of fetal hemoglobin interfere with the HbA1C  assay. Heterozygous hemoglobin variants (HbS, HgC, etc)do  not significantly interfere with this assay.   However, presence of multiple variants may affect accuracy.     05/31/2023 6.2 (H) 4.0 - 5.6 % Final     Comment:     ADA Screening Guidelines:  5.7-6.4%  Consistent with prediabetes  >or=6.5%  Consistent with diabetes    High levels of fetal hemoglobin interfere with the HbA1C  assay. Heterozygous hemoglobin variants (HbS, HgC, etc)do  not significantly interfere with this assay.   However, presence of multiple variants may affect accuracy.     11/09/2022 5.9 (H) 4.0 - 5.6 % Final     Comment:     ADA Screening Guidelines:  5.7-6.4%  Consistent with prediabetes  >or=6.5%  Consistent with diabetes    High levels of fetal hemoglobin  interfere with the HbA1C  assay. Heterozygous hemoglobin variants (HbS, HgC, etc)do  not significantly interfere with this assay.   However, presence of multiple variants may affect accuracy.         Review of Systems   Constitutional: Negative for chills and fever.   Cardiovascular:  Positive for leg swelling. Negative for chest pain.   Respiratory:  Negative for cough and shortness of breath.    Gastrointestinal:  Negative for diarrhea, nausea and vomiting.   Neurological:  Positive for numbness and paresthesias.        Objective:     Physical Exam  Vitals reviewed.   Constitutional:       General: She is not in acute distress.     Appearance: Normal appearance. She is not ill-appearing.   HENT:      Head: Normocephalic.      Nose: Nose normal.   Pulmonary:      Effort: Pulmonary effort is normal. No respiratory distress.   Skin:     Capillary Refill: Capillary refill takes 2 to 3 seconds.   Neurological:      Mental Status: She is alert and oriented to person, place, and time.   Psychiatric:         Mood and Affect: Mood normal.         Behavior: Behavior normal.         Thought Content: Thought content normal.         Judgment: Judgment normal.     Neurologic: Protective and light touch sensation intact bilateral lower extremity, positive paresthesias reported, positive numbness reported   Musculoskeletal: 5/5 muscle strength noted bilateral foot, ankle joint range of motion is reduced without pain, pain and tenderness palpation right 4th interspace, lateral deviation of right 5th metatarsal with prominent dorsal lateral eminence and mild contracted right 5th digit  Dermatologic:  Atrophic soft tissue skin changes noted bilateral lower extremity, nails 1 through 5 bilateral are thickened discolored and elongated, hyperkeratotic skin lesion noted in the right 4th interspace  Vascular: DP and PT pulses palpable 1/4 bilateral foot, capillary fill time less 3 seconds to distal aspect of the digits bilateral foot, +1  nonpitting edema noted bilateral ankle, pedal hair growth is absent bilateral lower extremity, skin temperature gradient warm to cool from proximal to distal bilateral lower extremity      Assessment:      Encounter Diagnoses   Name Primary?    Pre-ulcerative calluses - Right Foot     Alex ferrari, right Yes    Type II diabetes mellitus with neurological manifestations     Disease of nail      Plan:     Cinthya was seen today for nail care.    Diagnoses and all orders for this visit:    Alex ferrari, right    Pre-ulcerative calluses - Right Foot  -     Routine Foot Care    Type II diabetes mellitus with neurological manifestations  -     Routine Foot Care    Disease of nail  -     Routine Foot Care      I counseled the patient on her conditions, their implications and medical management.        1. Patient was examined and evaluated.    2. Discussed with patient presence of a betsy's bunion in his possible contribution to interdigital callus formation.  Discussed briefly surgical intervention for bunion corrections but will be avoided secondary to advanced age of patient.  Patient was advised to adjust shoe gear for better comfort fit including increased toe box height and width selection shoe gear with soft stretchable uppers.  Patient was dispensed multiple forms of pads for offloading of the area.  Discussed with patient potential benefits of a local corticosteroid injection should pain complaints persist or worsen over time   Routine Foot Care    Date/Time: 5/22/2024 11:30 AM    Performed by: Ranjit Reece DPM  Authorized by: Ranjit Reece DPM    Consent Done?:  Yes (Verbal)  Hyperkeratotic Skin Lesions?: Yes    Number of trimmed lesions:  1  Location(s):  Right 5th Toe    Nail Care Type:  Debride  Location(s): All  (Left 1st Toe, Left 3rd Toe, Left 2nd Toe, Left 4th Toe, Left 5th Toe, Right 1st Toe, Right 2nd Toe, Right 3rd Toe, Right 4th Toe and Right 5th Toe)  Patient tolerance:   Patient tolerated the procedure well with no immediate complications      3. Discussed with patient etiology of callus formation.  Patient was warned of potential recurrence over time.  Patient was dispensed offloading pads for reduction of direct contact to the lesion.  Patient was advised to perform safe debridement at home with a emery board, nail file or pumice stone.  Patient was advised to apply ointments / moisturizer to the area for softening purposes.  4. Patient was advised to continue with daily foot monitoring.  Patient will continue efforts proper glycemic control, lowering hemoglobin A1c, adherence to diabetic medication regimen  5. Discussed with patient etiology of callus formation.  Patient was warned of potential recurrence over time.  Patient was dispensed offloading pads for reduction of direct contact to the lesion.  Patient was advised to perform safe debridement at home with a emery board, nail file or pumice stone.  Patient was advised to apply ointments / moisturizer to the area for softening purposes.  6. Patient will follow up in 9 weeks or p.r.n. for complaints

## 2024-07-31 ENCOUNTER — OFFICE VISIT (OUTPATIENT)
Dept: PODIATRY | Facility: CLINIC | Age: 89
End: 2024-07-31
Payer: MEDICARE

## 2024-07-31 VITALS — HEIGHT: 62 IN | BODY MASS INDEX: 23 KG/M2 | WEIGHT: 125 LBS

## 2024-07-31 DIAGNOSIS — I73.9 PERIPHERAL VASCULAR DISEASE: Primary | ICD-10-CM

## 2024-07-31 DIAGNOSIS — L60.9 DISEASE OF NAIL: ICD-10-CM

## 2024-07-31 DIAGNOSIS — E11.51 TYPE II DIABETES MELLITUS WITH PERIPHERAL CIRCULATORY DISORDER: ICD-10-CM

## 2024-07-31 DIAGNOSIS — L97.511 ULCER OF RIGHT FOOT, LIMITED TO BREAKDOWN OF SKIN: ICD-10-CM

## 2024-07-31 DIAGNOSIS — L84 CORN OR CALLUS: ICD-10-CM

## 2024-07-31 PROCEDURE — 99214 OFFICE O/P EST MOD 30 MIN: CPT | Mod: 25,S$GLB,, | Performed by: PODIATRIST

## 2024-07-31 PROCEDURE — 11721 DEBRIDE NAIL 6 OR MORE: CPT | Mod: 59,Q9,S$GLB, | Performed by: PODIATRIST

## 2024-07-31 PROCEDURE — 97597 DBRDMT OPN WND 1ST 20 CM/<: CPT | Mod: 59,S$GLB,, | Performed by: PODIATRIST

## 2024-07-31 PROCEDURE — 11056 PARNG/CUTG B9 HYPRKR LES 2-4: CPT | Mod: Q9,S$GLB,, | Performed by: PODIATRIST

## 2024-07-31 NOTE — PROGRESS NOTES
[facility] [phone]                                     Daily Progress Note                                                         Pt Name: Radha Morales  Medical Record Number: [id number]  Date of Birth 1960   Today's Date: 2/14/2020  Chief Complaint   Patient presents with    Shortness of Breath     x4 days        ASSESSMENT/PLAN  Right upper quadrant cystic mass  -Drain upsized 2/10   -hungry and eating today  -drain flushed, continue daily flushes  -Will need repeat imaging at some point to assess collections  -Could probably be done as an outpatient if he is otherwise ready to go   Continue drains to bulb suction for now      Ezzard Bridegroom had denies any nausea or emesis. Ostomy working. Drain working: bloody drainage    OBJECTIVE  VITALS:  height is 5' 7\" (1.702 m) and weight is 187 lb 9.8 oz (85.1 kg). His oral temperature is 97.2 °F (36.2 °C). His blood pressure is 104/73 and his pulse is 108. His respiration is 16 and oxygen saturation is 99%. INTAKE/OUTPUT:      Intake/Output Summary (Last 24 hours) at 2/14/2020 1036  Last data filed at 2/14/2020 1034  Gross per 24 hour   Intake 600 ml   Output 1268 ml   Net -668 ml     GENERAL: alert, no distress  ABDOMEN: Soft, non-tender, moderately distended. Drain right abdomen bloody drainage. Colostomy present with  stool in appliance. I/O last 3 completed shifts:   In: 5 [P.O.:720]  Out: 1088 [Urine:300; Drains:188; Stool:600]      LABS  Recent Labs     02/14/20  0601   WBC 9.9   HGB 7.6*   HCT 23.1*      *   K 4.5   CL 94*   CO2 34*   BUN 10   CREATININE 0.6*   MG 2.20   CALCIUM 8.0*       EDUCATION  Patient educated about Disease Process, Medications, Smoking Cessation, Oxygenation, Incentive Spirometry and Deep Breath and Cough, Diabetes, Hyperlipidemia, Smoking Cessation, Nutrition, Exercise and Hypertension    Electronically signed by Maribel Martinez Subjective:     Patient ID: Cinthya Cuevas is a 89 y.o. female    Chief Complaint: Nail Care       Cinthya is a 89 y.o. female who presents to the clinic for evaluation and treatment of high risk feet. Cinthya has a past medical history of Anemia, Arthritis, Diabetes mellitus, Diabetes mellitus, type 2, Encounter for blood transfusion, Impaired mobility, Retinal hemorrhage of right eye (08/09/2023), and Ulcer of right foot with fat layer exposed (08/16/2022). The patient's chief complaint is diabetic foot ulcer, lateral left 4th toe. This patient has documented high risk feet requiring routine maintenance secondary to peripheral vascular disease.    PCP: Dariana Martin MD    Date Last Seen by PCP: 04/03/2024    Current shoe gear:  Affected Foot:  sandals     Unaffected Foot:  sandals    History of Trauma: negative  Sign of Infection: none    Hemoglobin A1C   Date Value Ref Range Status   03/05/2024 5.9 (H) <5.7 % Final     Comment:       Prediabetic: 5.7 - 6.4 %  Diabetic: > 6.4 %   09/18/2023 5.8 (H) 4.0 - 5.6 % Final     Comment:     ADA Screening Guidelines:  5.7-6.4%  Consistent with prediabetes  >or=6.5%  Consistent with diabetes    High levels of fetal hemoglobin interfere with the HbA1C  assay. Heterozygous hemoglobin variants (HbS, HgC, etc)do  not significantly interfere with this assay.   However, presence of multiple variants may affect accuracy.     05/31/2023 6.2 (H) 4.0 - 5.6 % Final     Comment:     ADA Screening Guidelines:  5.7-6.4%  Consistent with prediabetes  >or=6.5%  Consistent with diabetes    High levels of fetal hemoglobin interfere with the HbA1C  assay. Heterozygous hemoglobin variants (HbS, HgC, etc)do  not significantly interfere with this assay.   However, presence of multiple variants may affect accuracy.     11/09/2022 5.9 (H) 4.0 - 5.6 % Final     Comment:     ADA Screening Guidelines:  5.7-6.4%  Consistent with prediabetes  >or=6.5%  Consistent with diabetes    High levels of  Terence ANDRADE on 2/14/2020 at 10:36 AM      As above  Sitting up in the chair, looks a little better    AROLDO with ongoing drainage of old blood    Will need follow up CT at some point but if drainage persists would hold for now    OK for discharge with drains    Follow up next week for CT and possible drain removal    Electronically signed by Kale Costa MD on 2/14/2020 at 2:20 PM fetal hemoglobin interfere with the HbA1C  assay. Heterozygous hemoglobin variants (HbS, HgC, etc)do  not significantly interfere with this assay.   However, presence of multiple variants may affect accuracy.         Review of Systems   Constitutional: Negative.  Negative for chills and fever.   Respiratory:  Negative for cough and shortness of breath.    Cardiovascular:  Positive for leg swelling. Negative for chest pain.   Gastrointestinal:  Negative for diarrhea, nausea and vomiting.   Neurological:  Positive for tingling.        Objective:   Physical Exam  Vitals reviewed.   Constitutional:       General: She is not in acute distress.     Appearance: Normal appearance. She is not ill-appearing.   HENT:      Head: Normocephalic.      Nose: Nose normal.   Cardiovascular:      Pulses:           Dorsalis pedis pulses are 1+ on the right side and 1+ on the left side.        Posterior tibial pulses are 1+ on the right side.   Pulmonary:      Effort: Pulmonary effort is normal. No respiratory distress.   Musculoskeletal:      Right lower leg: Edema present.      Left lower leg: Edema present.   Feet:      Right foot:      Skin integrity: Ulcer (Right 4th digit lateral PIPJ measures 0.4 x 0.4 x 0.1 cm with 100% granular base and no drainage), callus (plantar right 1st MPJ) and dry skin present.   Skin:     Capillary Refill: Capillary refill takes 2 to 3 seconds.   Neurological:      Mental Status: She is alert and oriented to person, place, and time.   Psychiatric:         Mood and Affect: Mood normal.         Behavior: Behavior normal.         Thought Content: Thought content normal.         Judgment: Judgment normal.        Foot Exam    General  General Appearance: appears stated age and healthy   Orientation: alert and oriented to person, place, and time   Affect: appropriate   Assistance: walker use       Right Foot/Ankle     Inspection and Palpation  Tenderness: (Right 4th digit lateral PIPJ)  Skin Exam: callus  (plantar right 1st MPJ), dry skin, corn (lateral right 5th MPJ), skin changes and ulcer (Right 4th digit lateral PIPJ measures 0.4 x 0.4 x 0.1 cm with 100% granular base and no drainage);     Neurovascular  Dorsalis pedis: 1+  Posterior tibial: 1+    Muscle Strength  Ankle dorsiflexion: 5  Ankle plantar flexion: 5  Ankle inversion: 5  Ankle eversion: 5  Great toe extension: 5  Great toe flexion: 5      Left Foot/Ankle      Neurovascular  Dorsalis pedis: 1+    Muscle Strength  Ankle dorsiflexion: 5  Ankle plantar flexion: 5  Ankle inversion: 5  Ankle eversion: 5  Great toe extension: 5  Great toe flexion: 5      Dermatologic: Nails 1 through 5 bilateral are thickened elongated discolored with subungual debris, preulcerative hyperkeratotic skin lesion noted to the lateral aspect right 5th MPJ and plantar right 1st MPJ and distal tip of the right 4th digit laterally  Vascular:  Pedal hair growth is absent bilateral lower extremity, positive venous stasis dermatitis changes left lower extremity greater than right, skin temperature gradient warm to cool proximal distal bilateral lower extremity, +2 nonpitting edema noted to the left ankle +1 nonpitting edema noted to the right ankle  Neurologic: Positive paresthesias reported    Assessment:         1. Peripheral vascular disease    2. Disease of nail    3. Ulcer of right foot, limited to breakdown of skin    4. Type II diabetes mellitus with peripheral circulatory disorder    5. Corn or callus       Plan:     Cinthya M Cuevas was seen today for   Chief Complaint   Patient presents with    Nail Care       Assessment & Plan           Routine Foot Care    Date/Time: 7/31/2024 11:00 AM    Performed by: Ranjit Reece DPM  Authorized by: Ranjit Reece DPM    Consent Done?:  Yes (Verbal)  Hyperkeratotic Skin Lesions?: Yes    Number of trimmed lesions:  3  Location(s):  Left 4th Toe, Right 5th Metatarsal Head and Right 1st Metatarsal Head    Nail Care Type:   Debride  Location(s): All  (Left 1st Toe, Left 3rd Toe, Left 2nd Toe, Left 4th Toe, Left 5th Toe, Right 1st Toe, Right 2nd Toe, Right 3rd Toe, Right 4th Toe and Right 5th Toe)  Patient tolerance:  Patient tolerated the procedure well with no immediate complications  Wound Debridement    Date/Time: 7/31/2024 11:00 AM    Performed by: Ranjit Reece DPM  Authorized by: Ranjit Reece DPM    Consent Done?:  Yes (Verbal)    Wound Details:    Location:  Right foot    Location:  Right 4th Toe       Length (cm):  0.4       Area (sq cm):  0.16       Width (cm):  0.4       Percent Debrided (%):  100       Depth (cm):  0.1       Total Area Debrided (sq cm):  0.16    Depth of debridement:  Epidermis/Dermis    Devitalized tissue debrided:  Biofilm and Callus    Instruments:  Blade  Bleeding:  None  Patient tolerance:  Patient tolerated the procedure well with no immediate complications  1st Wound Pain Assessment: 0       Patient was examined and evaluated.    2. Patient made aware that pain and tenderness from right 4th digit is related to ulceration related to impact and pressure from the right 5th digit.  Patient was made aware that the area is not infected.  Patient was dispensed offloading pads for protection of direct contact to this area.  Patient had offloading pad applied and triple antibiotic ointment applied to the area.  Patient was advised to not soak the right foot.  Patient will try to keep the area as dry as possible.  3. Patient was made aware of constitutional symptoms.  Patient was advised to report any constitutional symptoms to local urgent care center, primary care physician, or [facility].    4. Discussed with patient etiology of peripheral vascular disease.  Patient was advised elevate lower extremity rest consider daily use of compression stockings.  Patient will monitor dietary salt intake and water consumption.    5. Discussed with patient the etiology of nail fungus and as  possible secondary issue to prior nail trauma.  Discussed with patient OTC topical conservative treatments such as Vicks vapor rub, tea tree oil as well as coconut oil.  Discussed with patient risks and benefits oral Lamisil therapy.   6. Discussed with patient etiology of callus formation.  Patient was warned of potential recurrence over time.  Patient was dispensed offloading pads for reduction of direct contact to the lesion.  Patient was advised to perform safe debridement at home with a emery board, nail file or pumice stone.  Patient was advised to apply ointments / moisturizer to the area for softening purposes.  7. Patient will follow-up in 1 week for wound care or p.r.n. for complaints      Joe Reece DPM  [address] [place] [address]  [phone]      This note was created using [employer] direct voice recognition software. Note may have occasional typographical errors that may not have been identified and edited despite initial review prior to signing.

## 2024-08-07 ENCOUNTER — OFFICE VISIT (OUTPATIENT)
Dept: PODIATRY | Facility: CLINIC | Age: 89
End: 2024-08-07
Payer: MEDICARE

## 2024-08-07 DIAGNOSIS — I73.9 PERIPHERAL VASCULAR DISEASE: ICD-10-CM

## 2024-08-07 DIAGNOSIS — L60.9 DISEASE OF NAIL: ICD-10-CM

## 2024-08-07 DIAGNOSIS — M21.621 TAILOR'S BUNIONETTE, RIGHT: ICD-10-CM

## 2024-08-07 DIAGNOSIS — L84 PRE-ULCERATIVE CALLUSES: ICD-10-CM

## 2024-08-07 DIAGNOSIS — M20.41 HAMMER TOE OF RIGHT FOOT: Primary | ICD-10-CM

## 2024-08-07 PROCEDURE — 99213 OFFICE O/P EST LOW 20 MIN: CPT | Mod: S$GLB,,, | Performed by: PODIATRIST

## 2024-08-15 NOTE — PROGRESS NOTES
Subjective:     Patient ID: Cinthya Cuevas is a 89 y.o. female    Chief Complaint: Jennifer Mendes is a 89 y.o. female who presents to the podiatry clinic  with complaint of  right foot pain. Onset of the symptoms was several weeks ago. Precipitating event:  Growth of painful calluses and presence of small wound at last office visit . Current symptoms include: ability to bear weight, but with some pain, stiffness, and worsening symptoms after a period of activity. Aggravating factors:  Ill-fitting shoes . Symptoms have gradually improved. Patient has had prior foot problems. Treatment to date:  Debridement of calluses and local wound care with offloading . Patients rates pain 0/10 on pain scale.    Review of Systems   Constitutional: Negative.  Negative for chills and fever.   Respiratory:  Negative for cough and shortness of breath.    Cardiovascular:  Positive for leg swelling. Negative for chest pain.   Gastrointestinal:  Negative for diarrhea, nausea and vomiting.   Neurological:  Positive for tingling.        Objective:   Physical Exam  Vitals reviewed.   Constitutional:       General: She is not in acute distress.     Appearance: Normal appearance. She is not ill-appearing.   HENT:      Head: Normocephalic.      Nose: Nose normal.   Cardiovascular:      Pulses:           Dorsalis pedis pulses are 1+ on the right side and 1+ on the left side.        Posterior tibial pulses are 1+ on the right side and 1+ on the left side.   Pulmonary:      Effort: Pulmonary effort is normal. No respiratory distress.   Feet:      Right foot:      Skin integrity: Callus present.      Left foot:      Skin integrity: Dry skin present.   Skin:     Capillary Refill: Capillary refill takes 2 to 3 seconds.   Neurological:      Mental Status: She is alert and oriented to person, place, and time.   Psychiatric:         Mood and Affect: Mood normal.         Behavior: Behavior normal.         Thought Content: Thought content  normal.         Judgment: Judgment normal.        Foot Exam    General  General Appearance: appears stated age and healthy   Orientation: alert and oriented to person, place, and time   Affect: appropriate       Right Foot/Ankle     Inspection and Palpation  Swelling: dorsum   Hammertoes: fifth toe and fourth toe  Skin Exam: callus, skin changes and abnormal color;     Neurovascular  Dorsalis pedis: 1+  Posterior tibial: 1+      Left Foot/Ankle      Inspection and Palpation  Swelling: dorsum   Skin Exam: dry skin, skin changes and abnormal color;     Neurovascular  Dorsalis pedis: 1+  Posterior tibial: 1+          Dermatologic: Nails 1 through 5 bilateral are thickened elongated discolored with subungual debris, preulcerative hyperkeratotic skin lesion noted to the lateral aspect right 5th MPJ and plantar right 1st MPJ and distal tip of the right 4th digit laterally  Vascular:  Pedal hair growth is absent bilateral lower extremity, positive venous stasis dermatitis changes left lower extremity greater than right, skin temperature gradient warm to cool proximal distal bilateral lower extremity, +2 nonpitting edema noted to the left ankle +1 nonpitting edema noted to the right ankle  Neurologic: Positive paresthesias reported    Assessment:         No diagnosis found.   Plan:     Cinthya Cuevas was seen today for   Chief Complaint   Patient presents with    [facility]       Assessment & Plan           Procedures     1. Patient was examined and evaluated.  2. Discussed with patient etiology of callus formation.  Patient was warned of potential recurrence over time.  Patient was dispensed offloading pads for reduction of direct contact to the lesion.  Patient was advised to perform safe debridement at home with a emery board, nail file or pumice stone.  Patient was advised to apply ointments / moisturizer to the area for softening purposes.  3. Patient was advised of the etiology of hammertoe deformity.  Patient was  advised to adjust shoe gear for better comfort and fit including increased toe box height and width and selection shoe gear with soft stretchable uppers.  4. Patient made aware that previous wound has healed.  Patient will monitor for recurrence of symptoms.    5. Discussed with patient etiology of peripheral vascular disease.  Patient was advised elevate lower extremity rest consider daily use of compression stockings.  Patient will monitor dietary salt intake and water consumption.    6. Patient will follow-up in 8 weeks or p.r.n. for complaints        Joe Reece DPM  [address] [address] [address]  [phone]      This note was created using [employer] direct voice recognition software. Note may have occasional typographical errors that may not have been identified and edited despite initial review prior to signing.

## 2024-08-21 ENCOUNTER — OFFICE VISIT (OUTPATIENT)
Dept: FAMILY MEDICINE | Facility: CLINIC | Age: 89
End: 2024-08-21
Payer: MEDICARE

## 2024-08-21 ENCOUNTER — LAB VISIT (OUTPATIENT)
Dept: LAB | Facility: HOSPITAL | Age: 89
End: 2024-08-21
Payer: MEDICARE

## 2024-08-21 VITALS — HEART RATE: 68 BPM | SYSTOLIC BLOOD PRESSURE: 100 MMHG | DIASTOLIC BLOOD PRESSURE: 60 MMHG | OXYGEN SATURATION: 98 %

## 2024-08-21 DIAGNOSIS — R60.9 DEPENDENT EDEMA: ICD-10-CM

## 2024-08-21 DIAGNOSIS — M25.511 CHRONIC RIGHT SHOULDER PAIN: ICD-10-CM

## 2024-08-21 DIAGNOSIS — G47.00 INSOMNIA, UNSPECIFIED TYPE: ICD-10-CM

## 2024-08-21 DIAGNOSIS — G89.29 CHRONIC RIGHT HIP PAIN: ICD-10-CM

## 2024-08-21 DIAGNOSIS — R35.1 NOCTURIA: ICD-10-CM

## 2024-08-21 DIAGNOSIS — N76.1 SUBACUTE VAGINITIS: ICD-10-CM

## 2024-08-21 DIAGNOSIS — N32.81 OVERACTIVE BLADDER: ICD-10-CM

## 2024-08-21 DIAGNOSIS — G89.29 CHRONIC RIGHT SHOULDER PAIN: ICD-10-CM

## 2024-08-21 DIAGNOSIS — M25.50 ARTHRALGIA, UNSPECIFIED JOINT: ICD-10-CM

## 2024-08-21 DIAGNOSIS — E11.49 TYPE II DIABETES MELLITUS WITH NEUROLOGICAL MANIFESTATIONS: Primary | ICD-10-CM

## 2024-08-21 DIAGNOSIS — M25.551 CHRONIC RIGHT HIP PAIN: ICD-10-CM

## 2024-08-21 DIAGNOSIS — E11.42 DIABETIC PERIPHERAL NEUROPATHY ASSOCIATED WITH TYPE 2 DIABETES MELLITUS: ICD-10-CM

## 2024-08-21 DIAGNOSIS — F32.A DEPRESSION, UNSPECIFIED DEPRESSION TYPE: ICD-10-CM

## 2024-08-21 DIAGNOSIS — D51.9 ANEMIA DUE TO VITAMIN B12 DEFICIENCY, UNSPECIFIED B12 DEFICIENCY TYPE: ICD-10-CM

## 2024-08-21 DIAGNOSIS — E11.49 TYPE II DIABETES MELLITUS WITH NEUROLOGICAL MANIFESTATIONS: ICD-10-CM

## 2024-08-21 DIAGNOSIS — I25.10 ARTERIOSCLEROSIS OF CORONARY ARTERY: ICD-10-CM

## 2024-08-21 DIAGNOSIS — E78.2 MIXED HYPERLIPIDEMIA: ICD-10-CM

## 2024-08-21 LAB
ALBUMIN SERPL BCP-MCNC: 4 G/DL (ref 3.5–5.2)
ALP SERPL-CCNC: 81 U/L (ref 55–135)
ALT SERPL W/O P-5'-P-CCNC: 13 U/L (ref 10–44)
ANION GAP SERPL CALC-SCNC: 12 MMOL/L (ref 8–16)
AST SERPL-CCNC: 13 U/L (ref 10–40)
BILIRUB SERPL-MCNC: 0.5 MG/DL (ref 0.1–1)
BILIRUBIN, UA POC OHS: NEGATIVE
BLOOD, UA POC OHS: ABNORMAL
BUN SERPL-MCNC: 39 MG/DL (ref 8–23)
CALCIUM SERPL-MCNC: 9.7 MG/DL (ref 8.7–10.5)
CHLORIDE SERPL-SCNC: 102 MMOL/L (ref 95–110)
CLARITY, UA POC OHS: CLEAR
CO2 SERPL-SCNC: 24 MMOL/L (ref 23–29)
COLOR, UA POC OHS: YELLOW
CREAT SERPL-MCNC: 1.1 MG/DL (ref 0.5–1.4)
EST. GFR  (NO RACE VARIABLE): 48 ML/MIN/1.73 M^2
GLUCOSE SERPL-MCNC: 144 MG/DL (ref 70–110)
GLUCOSE, UA POC OHS: NEGATIVE
IRON SERPL-MCNC: 112 UG/DL (ref 30–160)
KETONES, UA POC OHS: NEGATIVE
LEUKOCYTES, UA POC OHS: NEGATIVE
NITRITE, UA POC OHS: NEGATIVE
PH, UA POC OHS: 7
POTASSIUM SERPL-SCNC: 4.2 MMOL/L (ref 3.5–5.1)
PROT SERPL-MCNC: 7.4 G/DL (ref 6–8.4)
PROTEIN, UA POC OHS: 100
SATURATED IRON: 21 % (ref 20–50)
SODIUM SERPL-SCNC: 138 MMOL/L (ref 136–145)
SPECIFIC GRAVITY, UA POC OHS: 1.02
TOTAL IRON BINDING CAPACITY: 528 UG/DL (ref 250–450)
TRANSFERRIN SERPL-MCNC: 357 MG/DL (ref 200–375)
UROBILINOGEN, UA POC OHS: 0.2

## 2024-08-21 PROCEDURE — 96372 THER/PROPH/DIAG INJ SC/IM: CPT | Mod: PBBFAC,PN

## 2024-08-21 PROCEDURE — 82728 ASSAY OF FERRITIN: CPT | Performed by: FAMILY MEDICINE

## 2024-08-21 PROCEDURE — 99214 OFFICE O/P EST MOD 30 MIN: CPT | Mod: S$PBB,,, | Performed by: FAMILY MEDICINE

## 2024-08-21 PROCEDURE — 80053 COMPREHEN METABOLIC PANEL: CPT | Performed by: FAMILY MEDICINE

## 2024-08-21 PROCEDURE — 99999 PR PBB SHADOW E&M-EST. PATIENT-LVL III: CPT | Mod: PBBFAC,,, | Performed by: FAMILY MEDICINE

## 2024-08-21 PROCEDURE — 99213 OFFICE O/P EST LOW 20 MIN: CPT | Mod: PBBFAC,PN | Performed by: FAMILY MEDICINE

## 2024-08-21 PROCEDURE — 82607 VITAMIN B-12: CPT | Performed by: FAMILY MEDICINE

## 2024-08-21 PROCEDURE — G2211 COMPLEX E/M VISIT ADD ON: HCPCS | Mod: S$PBB,,, | Performed by: FAMILY MEDICINE

## 2024-08-21 PROCEDURE — 83540 ASSAY OF IRON: CPT | Performed by: FAMILY MEDICINE

## 2024-08-21 PROCEDURE — 81003 URINALYSIS AUTO W/O SCOPE: CPT | Mod: PBBFAC,PN | Performed by: FAMILY MEDICINE

## 2024-08-21 PROCEDURE — 99999PBSHW POCT URINALYSIS(INSTRUMENT): Mod: PBBFAC,,,

## 2024-08-21 PROCEDURE — 99999PBSHW PR PBB SHADOW TECHNICAL ONLY FILED TO HB: Mod: PBBFAC,,,

## 2024-08-21 RX ORDER — DICLOFENAC SODIUM 10 MG/G
4 GEL TOPICAL 4 TIMES DAILY PRN
Qty: 1500 G | Refills: 3 | Status: SHIPPED | OUTPATIENT
Start: 2024-08-21

## 2024-08-21 RX ORDER — MIRABEGRON 25 MG/1
25 TABLET, FILM COATED, EXTENDED RELEASE ORAL DAILY
Qty: 90 TABLET | Refills: 3 | Status: SHIPPED | OUTPATIENT
Start: 2024-08-21 | End: 2025-08-21

## 2024-08-21 RX ORDER — NYSTATIN AND TRIAMCINOLONE ACETONIDE 100000; 1 [USP'U]/G; MG/G
OINTMENT TOPICAL 2 TIMES DAILY PRN
Qty: 90 G | Refills: 1 | Status: SHIPPED | OUTPATIENT
Start: 2024-08-21

## 2024-08-21 RX ORDER — TRAZODONE HYDROCHLORIDE 50 MG/1
50 TABLET ORAL NIGHTLY PRN
Qty: 90 TABLET | Refills: 3 | Status: SHIPPED | OUTPATIENT
Start: 2024-08-21 | End: 2025-08-20

## 2024-08-21 RX ORDER — FUROSEMIDE 20 MG/1
TABLET ORAL
Qty: 180 TABLET | Refills: 3 | Status: SHIPPED | OUTPATIENT
Start: 2024-08-21

## 2024-08-21 RX ORDER — LIDOCAINE 50 MG/G
1 PATCH TOPICAL DAILY
Qty: 90 PATCH | Refills: 3 | Status: SHIPPED | OUTPATIENT
Start: 2024-08-21

## 2024-08-21 RX ORDER — METFORMIN HYDROCHLORIDE 500 MG/1
500 TABLET ORAL
Qty: 90 TABLET | Refills: 3 | Status: SHIPPED | OUTPATIENT
Start: 2024-08-21 | End: 2025-08-15

## 2024-08-21 RX ORDER — AMITRIPTYLINE HYDROCHLORIDE 10 MG/1
10 TABLET, FILM COATED ORAL NIGHTLY PRN
Qty: 90 TABLET | Refills: 3 | Status: SHIPPED | OUTPATIENT
Start: 2024-08-21

## 2024-08-21 RX ORDER — CYANOCOBALAMIN 1000 UG/ML
1000 INJECTION, SOLUTION INTRAMUSCULAR; SUBCUTANEOUS
Status: COMPLETED | OUTPATIENT
Start: 2024-08-21 | End: 2024-08-21

## 2024-08-21 RX ORDER — ESCITALOPRAM OXALATE 20 MG/1
20 TABLET ORAL DAILY
Qty: 90 EACH | Refills: 3 | Status: SHIPPED | OUTPATIENT
Start: 2024-08-21 | End: 2025-08-21

## 2024-08-21 RX ORDER — POTASSIUM CHLORIDE 600 MG/1
8 TABLET, FILM COATED, EXTENDED RELEASE ORAL 2 TIMES DAILY PRN
Qty: 180 TABLET | Refills: 3 | Status: SHIPPED | OUTPATIENT
Start: 2024-08-21

## 2024-08-21 RX ORDER — ATORVASTATIN CALCIUM 40 MG/1
40 TABLET, FILM COATED ORAL NIGHTLY
Qty: 90 TABLET | Refills: 3 | Status: SHIPPED | OUTPATIENT
Start: 2024-08-21

## 2024-08-21 RX ADMIN — CYANOCOBALAMIN 1000 MCG: 1000 INJECTION INTRAMUSCULAR; SUBCUTANEOUS at 12:08

## 2024-08-21 NOTE — PATIENT INSTRUCTIONS
Lasix 20mg daily for 5 days straight, along with potassium 8mEq daily.  Weigh daily first thing when you wake up after you pee; keep a daily weight record.  After 5 days, call and notify me of your daily weights AND how you have done at night re: getting up 4-5 times to pee.

## 2024-08-21 NOTE — PROGRESS NOTES
Subjective:       Patient ID: Cinthya Cuevas is a 89 y.o. female.    Chief Complaint: Follow-up (Pt is here for a 4 month follow up)    In regards to the patient's diabetes, patient denies hypoglycemic symptoms or any symptoms of fluctuating blood glucose.    In regards to the patient's dyslipidemia, patient reports compliance with medication regimen without side effects.    In regards to the patient's hypertension, patient denies chest pain/sob, and reports compliance with medication regimen.    Trouble sleeping.  Reports nocturia x 5+.          8/21/2024    10:51 AM 11/9/2022    10:27 AM 8/17/2021     9:11 AM   Depression Patient Health Questionnaire   Over the last two weeks how often have you been bothered by little interest or pleasure in doing things Not at all Not at all Not at all   Over the last two weeks how often have you been bothered by feeling down, depressed or hopeless Not at all Not at all Not at all   PHQ-2 Total Score 0 0 0          No data to display                Review of Systems   All other systems reviewed and are negative.        Past Medical History:   Diagnosis Date    Anemia     Arthritis     Diabetes mellitus     Diabetes mellitus, type 2     Encounter for blood transfusion     Impaired mobility     Retinal hemorrhage of right eye 08/09/2023    EYE EXAM IN MEDIA    Ulcer of right foot with fat layer exposed 08/16/2022     Past Surgical History:   Procedure Laterality Date    APPENDECTOMY      BACK SURGERY      3/2015    CHOLECYSTECTOMY      COLONOSCOPY      EYE SURGERY Bilateral     cataract    FRACTURE SURGERY      right ankle 2000    HYSTERECTOMY      JOINT REPLACEMENT      left hip, right knee     No family history on file.    Review of patient's allergies indicates:  No Known Allergies    Current Outpatient Medications:     FREESTYLE INSULINX Misc, , Disp: , Rfl:     FREESTYLE LANCETS 28 gauge lancets, , Disp: , Rfl:     FREESTYLE LITE STRIPS Strp, , Disp: , Rfl:      gabapentin (NEURONTIN) 300 MG capsule, Take 1 capsule (300 mg total) by mouth every evening. For nerve pain, Disp: 90 capsule, Rfl: 3    MERRITT-TUSSIN DM  mg/5 mL Liqd, Take 10 mLs by mouth., Disp: , Rfl:     multivitamin-iron-folic acid (CENTRUM WOMEN) Tab, Take 1 tablet by mouth Daily., Disp: 90 tablet, Rfl: 3    omega-3 fatty acids-fish oil 340-1,000 mg Cap, Take 1 capsule by mouth 2 (two) times daily. For cholesterol and heart, Disp: 180 capsule, Rfl: 3    amitriptyline (ELAVIL) 10 MG tablet, Take 1 tablet (10 mg total) by mouth nightly as needed for Insomnia or Pain., Disp: 90 tablet, Rfl: 3    atorvastatin (LIPITOR) 40 MG tablet, Take 1 tablet (40 mg total) by mouth every evening. For cholesterol, Disp: 90 tablet, Rfl: 3    diclofenac sodium (VOLTAREN) 1 % Gel, Apply 4 g topically 4 (four) times daily as needed (arthritis pain)., Disp: 1500 g, Rfl: 3    EScitalopram oxalate (LEXAPRO) 20 MG tablet, Take 1 tablet (20 mg total) by mouth once daily., Disp: 90 each, Rfl: 3    furosemide (LASIX) 20 MG tablet, Take 1-2 tablets daily if needed for swelling., Disp: 180 tablet, Rfl: 3    LIDOcaine (LIDODERM) 5 %, Place 1 patch onto the skin once daily., Disp: 90 patch, Rfl: 3    metFORMIN (GLUCOPHAGE) 500 MG tablet, Take 1 tablet (500 mg total) by mouth daily with dinner or evening meal. For diabetes, Disp: 90 tablet, Rfl: 3    mirabegron (MYRBETRIQ) 25 mg Tb24 ER tablet, Take 1 tablet (25 mg total) by mouth once daily., Disp: 90 tablet, Rfl: 3    nystatin-triamcinolone (MYCOLOG) ointment, Apply topically 2 (two) times daily as needed (genital itching or irritation)., Disp: 90 g, Rfl: 1    potassium chloride (KLOR-CON) 8 MEQ TbSR, Take 1 tablet (8 mEq total) by mouth 2 (two) times daily as needed (with Lasix for fluid)., Disp: 180 tablet, Rfl: 3    SITagliptin phosphate (JANUVIA) 100 MG Tab, Take 1 tablet (100 mg total) by mouth once daily. For diabetes, Disp: 90 tablet, Rfl: 3    traZODone (DESYREL) 50 MG tablet,  Take 1 tablet (50 mg total) by mouth nightly as needed for Insomnia., Disp: 90 tablet, Rfl: 3    Current Facility-Administered Medications:     cyanocobalamin injection 1,000 mcg, 1,000 mcg, Intramuscular, Once, Dariana Martin MD      Objective:      /60 (BP Location: Left arm, Patient Position: Sitting, BP Method: Medium (Manual))   Pulse 68   SpO2 98%   Physical Exam  Vitals and nursing note reviewed.   Constitutional:       General: She is not in acute distress.     Appearance: Normal appearance.   Cardiovascular:      Rate and Rhythm: Normal rate and regular rhythm.   Pulmonary:      Effort: Pulmonary effort is normal. No respiratory distress.      Breath sounds: Normal breath sounds.   Musculoskeletal:      Right lower leg: Edema (tr) present.      Left lower leg: Edema (tr) present.   Skin:     General: Skin is warm and dry.      Coloration: Skin is not jaundiced or pale.   Neurological:      Mental Status: She is alert and oriented to person, place, and time.   Psychiatric:         Mood and Affect: Mood normal.         Behavior: Behavior normal.         Thought Content: Thought content normal.         Judgment: Judgment normal.         Assessment:       1. Subacute vaginitis    2. Type II diabetes mellitus with neurological manifestations    3. Dependent edema    4. Diabetic peripheral neuropathy associated with type 2 diabetes mellitus    5. Arteriosclerosis of coronary artery    6. Arthralgia, unspecified joint    7. Chronic right hip pain    8. Chronic right shoulder pain    9. Depression, unspecified depression type    10. Insomnia, unspecified type    11. Mixed hyperlipidemia    12. Anemia due to vitamin B12 deficiency, unspecified B12 deficiency type    13. Nocturia    14. Overactive bladder        Plan:       Type II diabetes mellitus with neurological manifestations  -     potassium chloride (KLOR-CON) 8 MEQ TbSR; Take 1 tablet (8 mEq total) by mouth 2 (two) times daily as needed (with  Lasix for fluid).  Dispense: 180 tablet; Refill: 3  -     atorvastatin (LIPITOR) 40 MG tablet; Take 1 tablet (40 mg total) by mouth every evening. For cholesterol  Dispense: 90 tablet; Refill: 3  -     metFORMIN (GLUCOPHAGE) 500 MG tablet; Take 1 tablet (500 mg total) by mouth daily with dinner or evening meal. For diabetes  Dispense: 90 tablet; Refill: 3  -     SITagliptin phosphate (JANUVIA) 100 MG Tab; Take 1 tablet (100 mg total) by mouth once daily. For diabetes  Dispense: 90 tablet; Refill: 3  -     amitriptyline (ELAVIL) 10 MG tablet; Take 1 tablet (10 mg total) by mouth nightly as needed for Insomnia or Pain.  Dispense: 90 tablet; Refill: 3  -     Hemoglobin A1C; Future; Expected date: 08/21/2024  -     Comprehensive Metabolic Panel; Future; Expected date: 08/21/2024  -     CBC Auto Differential; Future; Expected date: 08/21/2024  Stable. Continue current treatment. Monitor labs as warranted.    Dependent edema  -     potassium chloride (KLOR-CON) 8 MEQ TbSR; Take 1 tablet (8 mEq total) by mouth 2 (two) times daily as needed (with Lasix for fluid).  Dispense: 180 tablet; Refill: 3  -     furosemide (LASIX) 20 MG tablet; Take 1-2 tablets daily if needed for swelling.  Dispense: 180 tablet; Refill: 3    Diabetic peripheral neuropathy associated with type 2 diabetes mellitus  -     potassium chloride (KLOR-CON) 8 MEQ TbSR; Take 1 tablet (8 mEq total) by mouth 2 (two) times daily as needed (with Lasix for fluid).  Dispense: 180 tablet; Refill: 3    Arteriosclerosis of coronary artery  -     potassium chloride (KLOR-CON) 8 MEQ TbSR; Take 1 tablet (8 mEq total) by mouth 2 (two) times daily as needed (with Lasix for fluid).  Dispense: 180 tablet; Refill: 3    Arthralgia, unspecified joint  -     diclofenac sodium (VOLTAREN) 1 % Gel; Apply 4 g topically 4 (four) times daily as needed (arthritis pain).  Dispense: 1500 g; Refill: 3    Chronic right hip pain  -     LIDOcaine (LIDODERM) 5 %; Place 1 patch onto the skin  once daily.  Dispense: 90 patch; Refill: 3    Chronic right shoulder pain  -     LIDOcaine (LIDODERM) 5 %; Place 1 patch onto the skin once daily.  Dispense: 90 patch; Refill: 3    Depression, unspecified depression type  -     EScitalopram oxalate (LEXAPRO) 20 MG tablet; Take 1 tablet (20 mg total) by mouth once daily.  Dispense: 90 each; Refill: 3  -     traZODone (DESYREL) 50 MG tablet; Take 1 tablet (50 mg total) by mouth nightly as needed for Insomnia.  Dispense: 90 tablet; Refill: 3    Insomnia, unspecified type  -     traZODone (DESYREL) 50 MG tablet; Take 1 tablet (50 mg total) by mouth nightly as needed for Insomnia.  Dispense: 90 tablet; Refill: 3    Subacute vaginitis  -     nystatin-triamcinolone (MYCOLOG) ointment; Apply topically 2 (two) times daily as needed (genital itching or irritation).  Dispense: 90 g; Refill: 1    Mixed hyperlipidemia  -     atorvastatin (LIPITOR) 40 MG tablet; Take 1 tablet (40 mg total) by mouth every evening. For cholesterol  Dispense: 90 tablet; Refill: 3    Anemia due to vitamin B12 deficiency, unspecified B12 deficiency type  -     CBC Auto Differential; Future; Expected date: 08/21/2024  -     Iron and TIBC; Future; Expected date: 08/21/2024  -     Ferritin; Future; Expected date: 08/21/2024  -     Vitamin B12; Future; Expected date: 08/21/2024  -     cyanocobalamin injection 1,000 mcg    Nocturia  -     POCT Urinalysis(Instrument)    Overactive bladder  -     mirabegron (MYRBETRIQ) 25 mg Tb24 ER tablet; Take 1 tablet (25 mg total) by mouth once daily.  Dispense: 90 tablet; Refill: 3    Lasix 20mg daily for 5 days straight, along with potassium 8mEq daily.  Weigh daily first thing when you wake up after you pee; keep a daily weight record.  After 5 days, call and notify me of your daily weights AND how you have done at night re: getting up 4-5 times to pee.        Previous records in Epic were reviewed, including the last 3 months of encounters, imaging, laboratory, and  pathology reports.    Strict return precautions reviewed and patient verbalized understanding. Risks, benefits, and alternatives to the plan were reviewed in detail and all questions answered to the patient's satisfaction. Patient agrees to return to clinic or ER if symptoms worsen. 30 minutes total were spent on today's visit, not limited to but including time based on counseling and coordination of care.    Patient instructed that best way to communicate with my office staff is for patient to get on the [facility] epic patient portal to expedite communication and communication issues that may occur.  Patient was given instructions on how to get on the portal.  I encouraged patient to obtain portal access as well.  Ultimately it is up to the patient to obtain access.  Patient voiced understanding.    This note was created using [employer] voice recognition software that occasionally may misinterpret phrases or words.    Follow up in about 3 months (around 11/21/2024) for follow up swelling and diabetes.

## 2024-08-22 ENCOUNTER — TELEPHONE (OUTPATIENT)
Dept: LAB | Facility: HOSPITAL | Age: 89
End: 2024-08-22
Payer: MEDICARE

## 2024-08-22 DIAGNOSIS — E11.49 TYPE II DIABETES MELLITUS WITH NEUROLOGICAL MANIFESTATIONS: Primary | ICD-10-CM

## 2024-08-22 LAB
FERRITIN SERPL-MCNC: 118 NG/ML (ref 20–300)
VIT B12 SERPL-MCNC: 540 PG/ML (ref 210–950)

## 2024-08-22 NOTE — TELEPHONE ENCOUNTER
Left a voicemail explaining we would have to redraw 2 of the labs from yesterday's visit (CBC and A1C). Told patient to call back so I could schedule her a lab appointment.

## 2024-10-02 ENCOUNTER — OFFICE VISIT (OUTPATIENT)
Dept: PODIATRY | Facility: CLINIC | Age: 89
End: 2024-10-02
Payer: MEDICARE

## 2024-10-02 VITALS — BODY MASS INDEX: 22.82 KG/M2 | WEIGHT: 124 LBS | HEIGHT: 62 IN

## 2024-10-02 DIAGNOSIS — I73.9 PERIPHERAL VASCULAR DISEASE: ICD-10-CM

## 2024-10-02 DIAGNOSIS — L84 CORN OR CALLUS: ICD-10-CM

## 2024-10-02 DIAGNOSIS — L97.511 ULCER OF RIGHT FOOT, LIMITED TO BREAKDOWN OF SKIN: Primary | ICD-10-CM

## 2024-10-02 DIAGNOSIS — L60.9 DISEASE OF NAIL: ICD-10-CM

## 2024-10-02 PROCEDURE — 11721 DEBRIDE NAIL 6 OR MORE: CPT | Mod: 59,Q9,S$GLB, | Performed by: PODIATRIST

## 2024-10-02 PROCEDURE — 97597 DBRDMT OPN WND 1ST 20 CM/<: CPT | Mod: 59,S$GLB,, | Performed by: PODIATRIST

## 2024-10-02 PROCEDURE — 99214 OFFICE O/P EST MOD 30 MIN: CPT | Mod: 25,S$GLB,, | Performed by: PODIATRIST

## 2024-10-02 PROCEDURE — 11055 PARING/CUTG B9 HYPRKER LES 1: CPT | Mod: Q9,S$GLB,, | Performed by: PODIATRIST

## 2024-10-02 RX ORDER — GUAIFENESIN 600 MG/1
600 TABLET, EXTENDED RELEASE ORAL 2 TIMES DAILY
COMMUNITY
Start: 2024-08-10

## 2024-10-15 NOTE — PROGRESS NOTES
Subjective:     Patient ID: Cinthya Cuevas is a 89 y.o. female    Chief Complaint: Jennifer Mendes is a 89 y.o. female who presents to the clinic for evaluation and treatment of high risk feet. Cinthya has a past medical history of Anemia, Arthritis, Diabetes mellitus, Diabetes mellitus, type 2, Encounter for blood transfusion, Impaired mobility, Retinal hemorrhage of right eye (08/09/2023), and Ulcer of right foot with fat layer exposed (08/16/2022). The patient's chief complaint is foot ulcer, right 1st MPJ. This patient has documented high risk feet requiring routine maintenance secondary to peripheral vascular disease.    PCP: Dariana Martin MD    Date Last Seen by PCP: 08/21/2024    Current shoe gear:  Affected Foot: Tennis shoes     Unaffected Foot: Tennis shoes    History of Trauma: negative  Sign of Infection: none    Hemoglobin A1C   Date Value Ref Range Status   03/05/2024 5.9 (H) <5.7 % Final     Comment:       Prediabetic: 5.7 - 6.4 %  Diabetic: > 6.4 %   09/18/2023 5.8 (H) 4.0 - 5.6 % Final     Comment:     ADA Screening Guidelines:  5.7-6.4%  Consistent with prediabetes  >or=6.5%  Consistent with diabetes    High levels of fetal hemoglobin interfere with the HbA1C  assay. Heterozygous hemoglobin variants (HbS, HgC, etc)do  not significantly interfere with this assay.   However, presence of multiple variants may affect accuracy.     05/31/2023 6.2 (H) 4.0 - 5.6 % Final     Comment:     ADA Screening Guidelines:  5.7-6.4%  Consistent with prediabetes  >or=6.5%  Consistent with diabetes    High levels of fetal hemoglobin interfere with the HbA1C  assay. Heterozygous hemoglobin variants (HbS, HgC, etc)do  not significantly interfere with this assay.   However, presence of multiple variants may affect accuracy.     11/09/2022 5.9 (H) 4.0 - 5.6 % Final     Comment:     ADA Screening Guidelines:  5.7-6.4%  Consistent with prediabetes  >or=6.5%  Consistent with diabetes    High levels of fetal  hemoglobin interfere with the HbA1C  assay. Heterozygous hemoglobin variants (HbS, HgC, etc)do  not significantly interfere with this assay.   However, presence of multiple variants may affect accuracy.         Review of Systems   Constitutional: Negative.  Negative for chills and fever.   Respiratory:  Negative for cough and shortness of breath.    Cardiovascular:  Positive for leg swelling. Negative for chest pain.   Gastrointestinal:  Negative for diarrhea, nausea and vomiting.   Neurological:  Positive for tingling.        Objective:   Physical Exam  HENT:      Head: Normocephalic.   Cardiovascular:      Rate and Rhythm: Normal rate.      Pulses:           Dorsalis pedis pulses are 1+ on the right side and 1+ on the left side.        Posterior tibial pulses are 1+ on the right side and 1+ on the left side.   Feet:      Right foot:      Skin integrity: Ulcer (plantar right 1st MPJ) and callus (lateral right 5th MPJ) present.        Foot Exam    General  General Appearance: appears stated age and healthy   Orientation: alert and oriented to person, place, and time   Affect: appropriate       Right Foot/Ankle     Inspection and Palpation  Tenderness: none   Skin Exam: callus (lateral right 5th MPJ), skin changes, abnormal color and ulcer (plantar right 1st MPJ);     Neurovascular  Dorsalis pedis: 1+  Posterior tibial: 1+    Muscle Strength  Ankle dorsiflexion: 4-  Ankle plantar flexion: 4-  Ankle inversion: 4-  Ankle eversion: 4-      Left Foot/Ankle      Inspection and Palpation  Tenderness: none   Skin Exam: skin changes and abnormal color;     Neurovascular  Dorsalis pedis: 1+  Posterior tibial: 1+    Muscle Strength  Ankle dorsiflexion: 4-  Ankle plantar flexion: 4-  Ankle inversion: 4-  Ankle eversion: 4-       Dermatologic: Nails 1 through 5 bilateral are thickened elongated discolored with subungual debris, preulcerative hyperkeratotic skin lesion noted to the lateral aspect right 5th MPJ and plantar right  1st MPJ   Vascular:  Pedal hair growth is absent bilateral lower extremity, positive venous stasis dermatitis changes left lower extremity greater than right, skin temperature gradient warm to cool proximal distal bilateral lower extremity, +2 nonpitting edema noted to the left ankle +1 nonpitting edema noted to the right ankle  Neurologic: Positive paresthesias reported    Assessment:         1. Ulcer of right foot, limited to breakdown of skin    2. Peripheral vascular disease    3. Disease of nail    4. Corn or callus       Plan:     Cinthyahumberto Cuevas was seen today for   Chief Complaint   Patient presents with    Callouses       Assessment & Plan           Routine Foot Care    Date/Time: 10/2/2024 10:30 AM    Performed by: Ranjit Reece DPM  Authorized by: Ranjit Reece DPM    Consent Done?:  Yes (Verbal)  Hyperkeratotic Skin Lesions?: Yes    Number of trimmed lesions:  1  Location(s):  Right 5th Metatarsal Head    Nail Care Type:  Debride  Location(s): All  (Left 1st Toe, Left 3rd Toe, Left 2nd Toe, Left 4th Toe, Left 5th Toe, Right 1st Toe, Right 2nd Toe, Right 3rd Toe, Right 4th Toe and Right 5th Toe)  Patient tolerance:  Patient tolerated the procedure well with no immediate complications  Wound Debridement    Date/Time: 10/2/2024 10:30 AM    Performed by: Ranjit Reece DPM  Authorized by: Ranjit Reece DPM    Consent Done?:  Yes (Verbal)    Wound Details:    Location:  Right foot    Location:  Right 1st Metatarsal Head       Length (cm):  0.5       Area (sq cm):  0.25       Width (cm):  0.5       Percent Debrided (%):  100       Depth (cm):  0.1       Total Area Debrided (sq cm):  0.25    Depth of debridement:  Epidermis/Dermis    Devitalized tissue debrided:  Biofilm and Callus    Instruments:  Blade  Bleeding:  Minimal  Hemostasis Achieved: Yes  Method Used:  Pressure  Patient tolerance:  Patient tolerated the procedure well with no immediate complications  1st Wound Pain  Assessment: 0       1. Patient was examined and evaluated.    2. Patient made aware of small wound on the plantar aspect right foot related to overgrowth of callus in the area.  Patient made aware that areas not infected.  Patient was dispensed offloading pads for prevention of direct contact of the wound with a ground shoe.  Patient was advised to clean the area thoroughly with Betadine solution and apply dry sterile bandage on a daily basis.  3. Discussed with patient etiology of callus formation.  Patient was warned of potential recurrence over time.  Patient was dispensed offloading pads for reduction of direct contact to the lesion.  Patient was advised to perform safe debridement at home with a emery board, nail file or pumice stone.  Patient was advised to apply ointments / moisturizer to the area for softening purposes.  4. Discussed with patient the etiology of nail fungus and as possible secondary issue to prior nail trauma.  Discussed with patient OTC topical conservative treatments such as Vicks vapor rub, tea tree oil as well as coconut oil.  Discussed with patient risks and benefits oral Lamisil therapy.   5. Discussed with patient etiology of peripheral vascular disease.  Patient was advised elevate lower extremity rest consider daily use of compression stockings.  Patient will monitor dietary salt intake and water consumption.           Joe Reece DPM  [address] [place] [address]  [phone]      This note was created using [employer] direct voice recognition software. Note may have occasional typographical errors that may not have been identified and edited despite initial review prior to signing.

## 2024-10-29 NOTE — PROGRESS NOTES
[facility]     Patient Identification:  Name:  Lety Johnson  Age:  42 y.o.  Sex:  female  :  1981  MRN:  [id number]  Visit Number:  [id number]  Primary Care Provider:  Concha Rao APRN    Length of stay:  31    Chief complaint: None    Subjective:    Patient briefly seen and examined at bedside.  Per nursing staff, no new events overnight.  Patient with no significant new complaints overnight.  Patient still pending placement.  ----------------------------------------------------------------------------------------------------------------------  Current Hospital Meds:  acyclovir, 400 mg, Oral, Nightly  aspirin, 81 mg, Oral, Daily  atorvastatin, 80 mg, Oral, Daily  DULoxetine, 60 mg, Oral, Daily  heparin (porcine), 5,000 Units, Subcutaneous, Q8H  insulin glargine, 30 Units, Subcutaneous, Nightly  insulin glargine, 45 Units, Subcutaneous, Daily With Breakfast  insulin lispro, 4-24 Units, Subcutaneous, 4x Daily AC & at Bedtime  Lidocaine, 1 patch, Transdermal, Q24H  lisinopril, 20 mg, Oral, Daily  magic barrier cream, , Topical, BID  metoprolol tartrate, 25 mg, Oral, Q12H  nicotine, 1 patch, Transdermal, Q24H  nystatin, , Topical, Q12H  pantoprazole, 40 mg, Oral, Daily  sodium chloride, 10 mL, Intravenous, Q12H  sodium chloride, 10 mL, Intravenous, Q12H      Pharmacy Consult,       ----------------------------------------------------------------------------------------------------------------------  Vital Signs:  Temp:  [97.5 °F (36.4 °C)-97.9 °F (36.6 °C)] 97.9 °F (36.6 °C)  Heart Rate:  [] 100  Resp:  [16-18] 18  BP: ()/(51-76) 95/51      10/07/24  0511 10/08/24  0500 10/09/24  0500   Weight: 102 kg (225 lb 14.4 oz) (FIFI cameron) 102 kg (225 lb 15.5 oz) 102 kg (224 lb 1.6 oz)     Body mass index is 36.73 kg/m².    Intake/Output Summary (Last 24 hours) at 10/29/2024 1830  Last data filed at 10/29/2024 1300  Gross per 24 hour   Intake 1080 ml  Labs/Tests:  CBC:  Lab Results   Component Value Date    WBC 5.80 05/31/2023    RBC 3.14 (L) 05/31/2023    HGB 9.0 (L) 05/31/2023    HCT 28.9 (L) 05/31/2023    MCV 92 05/31/2023    MCH 28.7 05/31/2023    MCHC 31.1 (L) 05/31/2023    RDW 15.0 (H) 05/31/2023     05/31/2023    MPV 10.5 05/31/2023    GRAN 4.0 05/31/2023    GRAN 68.6 05/31/2023    LYMPH 1.2 05/31/2023    LYMPH 20.0 05/31/2023    MONO 0.4 05/31/2023    MONO 7.4 05/31/2023    EOS 0.1 05/31/2023    BASO 0.04 05/31/2023    EOSINOPHIL 2.4 05/31/2023    BASOPHIL 0.7 05/31/2023    DIFFMETHOD Automated 05/31/2023     CMP:  Lab Results   Component Value Date     05/31/2023    K 4.1 05/31/2023     05/31/2023    CO2 27 05/31/2023    BUN 22 05/31/2023    CREATININE 1.0 05/31/2023     (H) 05/31/2023    CALCIUM 9.4 05/31/2023    MG 1.8 05/31/2023    ALKPHOS 70 05/31/2023    PROT 6.4 05/31/2023    ALBUMIN 3.6 05/31/2023    BILITOT 0.5 05/31/2023    AST 16 05/31/2023    ALT 18 05/31/2023    ANIONGAP 9 05/31/2023    EGFRNORACEVR 54.2 (A) 05/31/2023     HA1C:  Lab Results   Component Value Date    HGBA1C 6.2 (H) 05/31/2023    ESTIMATEDAVG 131 05/31/2023     LIPIDS:  Lab Results   Component Value Date    CHOL 137 05/31/2023    TRIG 107 05/31/2023    HDL 50 05/31/2023    LDLCALC 65.6 05/31/2023    CHOLHDL 36.5 05/31/2023    TOTALCHOLEST 2.7 05/31/2023    NONHDLCHOL 87 05/31/2023     Magnesium:  Lab Results   Component Value Date    MG 1.8 05/31/2023     MicroAlbumin/Creatinine Ratio, Urine:  Lab Results   Component Value Date    LABMICR 385.0 11/09/2022    CREATRANDUR 56.0 11/09/2022    MICALBCREAT 687.5 (H) 11/09/2022     Iron / TIBC:  Lab Results   Component Value Date    IRON 52 06/22/2022    TRANSFERRIN 272 06/22/2022    TIBC 403 06/22/2022    FESATURATED 13 (L) 06/22/2022    FERRITIN 117 06/22/2022     TSH / T3 / T4:  Lab Results   Component Value Date    TSH 2.075 09/08/2021    T3FREE 2.5 09/08/2021    FREET4 1.09 09/08/2021     Vit B / Vit    Output --   Net 1080 ml     Diet: Regular/House, Diabetic; Consistent Carbohydrate; Fluid Consistency: Thin (IDDSI 0)  ----------------------------------------------------------------------------------------------------------------------  Physical exam:  Constitutional: Chronically ill-appearing  female in no apparent distress.     HENT:  Head:  Normocephalic and atraumatic.  Mouth:  Moist mucous membranes.    Eyes:  Conjunctivae and EOM are normal.  Pupils are equal, round, and reactive to light.  No scleral icterus.    Neck:  Neck supple. No thyromegaly.  No JVD present.    Cardiovascular: Sinus tachycardia with heart rate in the upper 120s, no murmurs, rubs, clicks or gallops appreciated.  Pulmonary/Chest:  Clear to auscultation bilaterally with no crackles, wheezes or rhonchi appreciated.  Abdominal:  Soft. Nontender. Nondistended  Bowel sounds are normal in all four quadrants. No organomegally appreciated.   Musculoskeletal:  No edema, no tenderness, and no deformity.  No red or swollen joints anywhere.    Neurological: Slightly lethargic, Cranial nerves II-XII intact with no focal deficits.  No facial droop.  No slurred speech.   Skin:  Warm and dry to palpation with no rashes or lesions appreciated.  Peripheral vascular:  2+ radial and pedal pulses in bilateral upper and lower extremities.    No significant change in physical exam in comparison to 10/28/2024  -------------------------------------------------------------------------  ----------------------------------------------------------------------------------------------------------------------      Results from last 7 days   Lab Units 10/27/24  0147   WBC 10*3/mm3 11.03*   HEMOGLOBIN g/dL 12.6   HEMATOCRIT % 39.8   MCV fL 103.1*   MCHC g/dL 31.7   PLATELETS 10*3/mm3 310         Results from last 7 days   Lab Units 10/27/24  0147   SODIUM mmol/L 140   POTASSIUM mmol/L 4.1   CHLORIDE mmol/L 103   CO2 mmol/L 24.6   BUN mg/dL 22*   CREATININE  D:  Lab Results   Component Value Date    MSHHTFKA51 503 05/31/2023    [id number] 60 09/08/2021       Subjective:       Patient ID: Cinthya Cuevas is a 88 y.o. female.    Chief Complaint: Follow-up    89yo female here today for follow up after a fall last week. She was at her home, getting purse from the closet and stood up to turn to her left and get her walker, but her leg gave out and she fell backwards. Hit the back of her head on chair leg. Was taken via ambulance to the ER ([facility]), dx with scalp contusion. CT head neg for intracranial abnormality. Patient denies LOC at the time of fall. Has been working with PT and OT and desires scooter to help with getting around her home. She has rollator walker with seat that she uses within the home and has a rolling walker that she uses when out of the home, but the lower extremities have become weaker over the last several months even when working with PT and OT.     R shoulder with chronic arthritis, also rotator cuff tear, and she has been seeing Dr. Piper for steroid injection every few months. Also uses Lidoderm patch topically which does help. Also uses Voltaren gel OTC which helps some.    Complains of ankles collecting fluid, she had to stop the OAB medication because she felt that worsened the dependent edema. Has not tried Lasix in the last several months. Denies SOB.    Had labs recently. Labs above reviewed in detail with patient and all questions answered to his/her satisfaction.    Needs med refills sent to [facility].        Depression Patient Health Questionnaire 11/9/2022 8/17/2021   Over the last two weeks how often have you been bothered by little interest or pleasure in doing things Not at all Not at all   Over the last two weeks how often have you been bothered by feeling down, depressed or hopeless Not at all Not at all   PHQ-2 Total Score 0 0     Review of Systems   Musculoskeletal:  Positive for arthralgias (R shoulder) and gait  "mg/dL 0.48*   CALCIUM mg/dL 9.5   GLUCOSE mg/dL 150*   Estimated Creatinine Clearance: 182.5 mL/min (A) (by C-G formula based on SCr of 0.48 mg/dL (L)).    No results found for: \"AMMONIA\"      No results found for: \"BLOODCX\"  No results found for: \"URINECX\"  No results found for: \"WOUNDCX\"  No results found for: \"STOOLCX\"    I have personally looked at the labs and they are summarized above.  ----------------------------------------------------------------------------------------------------------------------  Imaging Results (Last 24 Hours)       ** No results found for the last 24 hours. **          ----------------------------------------------------------------------------------------------------------------------  Assessment and Plan:    ESBL acute cystitis -patient has completed full course of IV antibiotic therapy with Invanz     2.  Acute metabolic encephalopathy -acute encephalopathy resolved, continue to monitor closely for changes in mental status.     3.  History of CVA - patient with left-sided hemiparesis secondary to history of CVA     4.  Debility -continue PT/OT     5.  History of genital herpes -continue acyclovir     6.  Morbid obesity -complicates all aspects of care    7.  Uncontrolled type 2 diabetes mellitus -blood sugar remains uncontrolled, will increase nightly dose of Lantus to 40 units subcu nightly.  Will continue Lantus 45 units SQ daily and Accu-Cheks before every meal/nightly with sliding scale insulin.    Disposition still pending placement    Marv Navas,    10/29/24   18:30 EDT     " problem.   All other systems reviewed and are negative.      Past Medical History:   Diagnosis Date    Anemia     Arthritis     Diabetes mellitus     Encounter for blood transfusion     Impaired mobility      Past Surgical History:   Procedure Laterality Date    APPENDECTOMY      BACK SURGERY      3/2015    CHOLECYSTECTOMY      COLONOSCOPY      EYE SURGERY Bilateral     cataract    FRACTURE SURGERY      right ankle 2000    HYSTERECTOMY      JOINT REPLACEMENT      left hip, right knee     History reviewed. No pertinent family history.    Review of patient's allergies indicates:   Allergen Reactions    Cefaclor      rash    Piroxicam Hives     rash       Current Outpatient Medications:     aspirin (ECOTRIN) 81 MG EC tablet, Take 81 mg by mouth., Disp: , Rfl:     clobetasol 0.05% (TEMOVATE) 0.05 % Oint, Apply topically 2 (two) times daily., Disp: , Rfl:     diclofenac sodium (VOLTAREN) 1 % Gel,  APPLY 4 GRAMS TOPICALLY 4 (FOUR) TIMES DAILY., # 400 g, 3 total refill(s), Acute, [facility] [Federal Rx: #400 last filled 06/22/22], Disp: , Rfl:     fluorouraciL (EFUDEX) 5 % cream, Apply topically 2 (two) times daily., Disp: , Rfl:     FREESTYLE INSULINX Misc, , Disp: , Rfl:     FREESTYLE LANCETS 28 gauge lancets, , Disp: , Rfl:     FREESTYLE LITE STRIPS Strp, , Disp: , Rfl:     HYDROcodone-acetaminophen (NORCO) 7.5-325 mg per tablet, Take 1 tablet by mouth., Disp: , Rfl:     multivitamin/iron/folic acid (CENTRUM WOMEN ORAL), Take by mouth., Disp: , Rfl:     omega-3 fatty acids-fish oil 340-1,000 mg Cap, Take 1,000 mg by mouth., Disp: , Rfl:     atorvastatin (LIPITOR) 40 MG tablet, Take 1 tablet (40 mg total) by mouth every evening. For cholesterol, Disp: 90 tablet, Rfl: 3    diclofenac sodium (VOLTAREN) 1 % Gel, Apply 4 g topically 4 (four) times daily as needed (arthritis pain)., Disp: 450 g, Rfl: 3    EScitalopram oxalate (LEXAPRO) 10 MG tablet, Take 1 tablet (10 mg total) by mouth once daily. For mood, Disp: 90 tablet, Rfl: 3     furosemide (LASIX) 20 MG tablet, Take 1-2 tablets daily if needed for swelling., Disp: 180 tablet, Rfl: 3    gabapentin (NEURONTIN) 300 MG capsule, Take 1 capsule (300 mg total) by mouth every evening. For diabetic neuropathy, Disp: 90 capsule, Rfl: 3    LIDOcaine (LIDODERM) 5 %, Place 1 patch onto the skin once daily., Disp: 90 patch, Rfl: 3    metFORMIN (GLUCOPHAGE) 500 MG tablet, Take 1 tablet (500 mg total) by mouth daily with dinner or evening meal. For diabetes, Disp: 90 tablet, Rfl: 3    potassium chloride (KLOR-CON) 8 MEQ TbSR, Take 1 tablet (8 mEq total) by mouth 2 (two) times daily as needed (with Lasix for fluid)., Disp: 180 tablet, Rfl: 3    SITagliptin phosphate (JANUVIA) 100 MG Tab, Take 1 tablet (100 mg total) by mouth once daily. For diabetes, Disp: 90 tablet, Rfl: 3    traZODone (DESYREL) 50 MG tablet, Take 1 tablet (50 mg total) by mouth nightly as needed for Insomnia., Disp: 90 tablet, Rfl: 3    Current Facility-Administered Medications:     [START ON 7/10/2023] cyanocobalamin injection 1,000 mcg, 1,000 mcg, Intramuscular, Q30 Days, Dariana H. MD Mario      Objective:      /68 (BP Location: Right arm, Patient Position: Sitting, BP Method: Medium (Manual))   Pulse 62   SpO2 97%   Physical Exam  Vitals and nursing note reviewed.   Constitutional:       General: She is not in acute distress.     Appearance: She is normal weight. She is not ill-appearing or toxic-appearing.   HENT:      Mouth/Throat:      Mouth: Mucous membranes are moist.   Eyes:      General: No scleral icterus.  Cardiovascular:      Rate and Rhythm: Normal rate and regular rhythm.      Heart sounds: Normal heart sounds.   Pulmonary:      Effort: Pulmonary effort is normal. No respiratory distress.      Breath sounds: Normal breath sounds. No wheezing.   Abdominal:      General: There is no distension.      Palpations: Abdomen is soft.   Musculoskeletal:         General: Tenderness (R shoulder) present.      Cervical  back: Neck supple.      Right lower leg: Edema (tr) present.      Left lower leg: Edema (tr) present.   Skin:     General: Skin is warm and dry.      Capillary Refill: Capillary refill takes less than 2 seconds. fingertips     Coloration: Skin is not jaundiced.      Findings: Bruising (forearms, posterior L scalp) present.   Neurological:      Mental Status: She is alert and oriented to person, place, and time.      Gait: Gait abnormal.   Psychiatric:         Mood and Affect: Mood normal.         Behavior: Behavior normal.         Thought Content: Thought content normal.         Judgment: Judgment normal.       Assessment:       1. Type II diabetes mellitus with neurological manifestations    2. Arthralgia, unspecified joint    3. Insomnia, unspecified type    4. Low serum vitamin B12    5. Dependent edema    6. Unsteady gait    7. At high risk for injury related to fall    8. History of skin cancer    9. Mixed hyperlipidemia    10. Diabetic peripheral neuropathy associated with type 2 diabetes mellitus    11. Chronic right hip pain    12. Chronic right shoulder pain    13. Osteoarthritis of right shoulder, unspecified osteoarthritis type    14. Overactive bladder    15. Nocturia    16. Anemia, unspecified type    17. Weakness        Plan:       Type II diabetes mellitus with neurological manifestations  -     atorvastatin (LIPITOR) 40 MG tablet; Take 1 tablet (40 mg total) by mouth every evening. For cholesterol  Dispense: 90 tablet; Refill: 3  -     SITagliptin phosphate (JANUVIA) 100 MG Tab; Take 1 tablet (100 mg total) by mouth once daily. For diabetes  Dispense: 90 tablet; Refill: 3  -     metFORMIN (GLUCOPHAGE) 500 MG tablet; Take 1 tablet (500 mg total) by mouth daily with dinner or evening meal. For diabetes  Dispense: 90 tablet; Refill: 3  -     CBC Auto Differential; Future; Expected date: 09/11/2023  -     Comprehensive Metabolic Panel; Future; Expected date: 09/11/2023  -     Hemoglobin A1C; Future;  Expected date: 09/11/2023  -     Microalbumin/Creatinine Ratio, Urine; Future; Expected date: 09/11/2023    Arthralgia, unspecified joint  -     diclofenac sodium (VOLTAREN) 1 % Gel; Apply 4 g topically 4 (four) times daily as needed (arthritis pain).  Dispense: 450 g; Refill: 3    Insomnia, unspecified type  -     traZODone (DESYREL) 50 MG tablet; Take 1 tablet (50 mg total) by mouth nightly as needed for Insomnia.  Dispense: 90 tablet; Refill: 3    Low serum vitamin B12  -     cyanocobalamin injection 1,000 mcg  -     cyanocobalamin injection 1,000 mcg    Dependent edema  -     furosemide (LASIX) 20 MG tablet; Take 1-2 tablets daily if needed for swelling.  Dispense: 180 tablet; Refill: 3  -     potassium chloride (KLOR-CON) 8 MEQ TbSR; Take 1 tablet (8 mEq total) by mouth 2 (two) times daily as needed (with Lasix for fluid).  Dispense: 180 tablet; Refill: 3    Unsteady gait  -     MOTORIZED SCOOTER FOR HOME USE    At high risk for injury related to fall  -     MOTORIZED SCOOTER FOR HOME USE    History of skin cancer  -     Ambulatory referral/consult to Dermatology; Future; Expected date: 06/21/2023    Mixed hyperlipidemia  -     atorvastatin (LIPITOR) 40 MG tablet; Take 1 tablet (40 mg total) by mouth every evening. For cholesterol  Dispense: 90 tablet; Refill: 3    Diabetic peripheral neuropathy associated with type 2 diabetes mellitus  -     EScitalopram oxalate (LEXAPRO) 10 MG tablet; Take 1 tablet (10 mg total) by mouth once daily. For mood  Dispense: 90 tablet; Refill: 3  -     gabapentin (NEURONTIN) 300 MG capsule; Take 1 capsule (300 mg total) by mouth every evening. For diabetic neuropathy  Dispense: 90 capsule; Refill: 3  -     cyanocobalamin injection 1,000 mcg    Chronic right hip pain  -     LIDOcaine (LIDODERM) 5 %; Place 1 patch onto the skin once daily.  Dispense: 90 patch; Refill: 3    Chronic right shoulder pain  -     LIDOcaine (LIDODERM) 5 %; Place 1 patch onto the skin once daily.  Dispense:  90 patch; Refill: 3    Osteoarthritis of right shoulder, unspecified osteoarthritis type    Overactive bladder    Nocturia    Anemia, unspecified type  -     CBC Auto Differential; Future; Expected date: 09/11/2023  -     Iron and TIBC; Future; Expected date: 09/11/2023  -     Ferritin; Future; Expected date: 09/11/2023    Weakness  -     MOTORIZED SCOOTER FOR HOME USE    Will follow up with PT/OT re: scooter for patient. Order signed. Will fax to DME of patient choice. There is a need for patient to have assistive device to help maintain her independence as well as prevent future falls. A motorized scooter would fit the need.  Referal to dermatology due to hx of skin cancer, due for annual skin check, and also what appears to be a hemangioma on R lower lip.  Meds refilled to [facility].  Trial of Lasix 20mg with Klor Con 8meq if needed for fluid.  Plan on repeat labs in 3-4 months with follow up after.  Schedule monthly B12 injection. Order in Epic. Try to align with any other OV (podiatry or with me etc.)        Previous records in Epic were reviewed, including the last 3 months of encounters, imaging, laboratory, and pathology reports.    Strict return precautions reviewed and patient verbalized understanding. Risks, benefits, and alternatives to the plan were reviewed in detail and all questions answered to the patient's satisfaction. Patient agrees to return to clinic or ER if symptoms worsen. 55 minutes total were spent on today's visit, not limited to but including time based on counseling and coordination of care.    Patient instructed that best way to communicate with my office staff is for patient to get on the [facility] epic patient portal to expedite communication and communication issues that may occur.  Patient was given instructions on how to get on the portal.  I encouraged patient to obtain portal access as well.  Ultimately it is up to the patient to obtain access.  Patient voiced understanding.    This  note was created using [email or web address] voice recognition software that occasionally may misinterpret phrases or words.    Follow up in about 3 months (around 9/25/2023) for f/u anemia, DM, lab results.

## 2025-01-13 DIAGNOSIS — Z00.00 ENCOUNTER FOR MEDICARE ANNUAL WELLNESS EXAM: ICD-10-CM
